# Patient Record
Sex: MALE | Race: WHITE | NOT HISPANIC OR LATINO
[De-identification: names, ages, dates, MRNs, and addresses within clinical notes are randomized per-mention and may not be internally consistent; named-entity substitution may affect disease eponyms.]

---

## 2017-01-04 ENCOUNTER — APPOINTMENT (OUTPATIENT)
Dept: CARDIOLOGY | Facility: CLINIC | Age: 57
End: 2017-01-04

## 2017-01-04 ENCOUNTER — NON-APPOINTMENT (OUTPATIENT)
Age: 57
End: 2017-01-04

## 2017-01-04 VITALS
SYSTOLIC BLOOD PRESSURE: 112 MMHG | DIASTOLIC BLOOD PRESSURE: 72 MMHG | WEIGHT: 225 LBS | BODY MASS INDEX: 31.38 KG/M2 | HEART RATE: 84 BPM | RESPIRATION RATE: 16 BRPM | OXYGEN SATURATION: 96 %

## 2017-01-12 ENCOUNTER — APPOINTMENT (OUTPATIENT)
Dept: CV DIAGNOSTICS | Facility: HOSPITAL | Age: 57
End: 2017-01-12

## 2017-01-12 ENCOUNTER — OUTPATIENT (OUTPATIENT)
Dept: OUTPATIENT SERVICES | Facility: HOSPITAL | Age: 57
LOS: 1 days | End: 2017-01-12

## 2017-01-12 DIAGNOSIS — R07.9 CHEST PAIN, UNSPECIFIED: ICD-10-CM

## 2017-01-12 DIAGNOSIS — K46.9 UNSPECIFIED ABDOMINAL HERNIA WITHOUT OBSTRUCTION OR GANGRENE: Chronic | ICD-10-CM

## 2017-01-12 DIAGNOSIS — I25.10 ATHEROSCLEROTIC HEART DISEASE OF NATIVE CORONARY ARTERY WITHOUT ANGINA PECTORIS: ICD-10-CM

## 2017-01-12 DIAGNOSIS — Z95.1 PRESENCE OF AORTOCORONARY BYPASS GRAFT: Chronic | ICD-10-CM

## 2017-01-12 DIAGNOSIS — Q15.9 CONGENITAL MALFORMATION OF EYE, UNSPECIFIED: Chronic | ICD-10-CM

## 2017-01-12 DIAGNOSIS — E11.9 TYPE 2 DIABETES MELLITUS WITHOUT COMPLICATIONS: ICD-10-CM

## 2017-01-16 ENCOUNTER — TRANSCRIPTION ENCOUNTER (OUTPATIENT)
Age: 57
End: 2017-01-16

## 2017-01-19 ENCOUNTER — INPATIENT (INPATIENT)
Facility: HOSPITAL | Age: 57
LOS: 0 days | Discharge: HOME | End: 2017-01-20
Attending: INTERNAL MEDICINE | Admitting: INTERNAL MEDICINE
Payer: COMMERCIAL

## 2017-01-19 VITALS
RESPIRATION RATE: 18 BRPM | OXYGEN SATURATION: 100 % | TEMPERATURE: 99 F | DIASTOLIC BLOOD PRESSURE: 76 MMHG | HEART RATE: 91 BPM | SYSTOLIC BLOOD PRESSURE: 118 MMHG

## 2017-01-19 DIAGNOSIS — K46.9 UNSPECIFIED ABDOMINAL HERNIA WITHOUT OBSTRUCTION OR GANGRENE: Chronic | ICD-10-CM

## 2017-01-19 DIAGNOSIS — Q15.9 CONGENITAL MALFORMATION OF EYE, UNSPECIFIED: Chronic | ICD-10-CM

## 2017-01-19 DIAGNOSIS — Z95.1 PRESENCE OF AORTOCORONARY BYPASS GRAFT: Chronic | ICD-10-CM

## 2017-01-19 DIAGNOSIS — R94.39 ABNORMAL RESULT OF OTHER CARDIOVASCULAR FUNCTION STUDY: ICD-10-CM

## 2017-01-19 LAB
BUN SERPL-MCNC: 15 MG/DL — SIGNIFICANT CHANGE UP (ref 7–23)
CALCIUM SERPL-MCNC: 9.8 MG/DL — SIGNIFICANT CHANGE UP (ref 8.4–10.5)
CHLORIDE SERPL-SCNC: 102 MMOL/L — SIGNIFICANT CHANGE UP (ref 98–107)
CO2 SERPL-SCNC: 22 MMOL/L — SIGNIFICANT CHANGE UP (ref 22–31)
CREAT SERPL-MCNC: 1.05 MG/DL — SIGNIFICANT CHANGE UP (ref 0.5–1.3)
GLUCOSE SERPL-MCNC: 96 MG/DL — SIGNIFICANT CHANGE UP (ref 70–99)
HBA1C BLD-MCNC: 5.7 % — HIGH (ref 4–5.6)
HCT VFR BLD CALC: 41.6 % — SIGNIFICANT CHANGE UP (ref 39–50)
HGB BLD-MCNC: 14.2 G/DL — SIGNIFICANT CHANGE UP (ref 13–17)
MCHC RBC-ENTMCNC: 30.7 PG — SIGNIFICANT CHANGE UP (ref 27–34)
MCHC RBC-ENTMCNC: 34.1 % — SIGNIFICANT CHANGE UP (ref 32–36)
MCV RBC AUTO: 89.8 FL — SIGNIFICANT CHANGE UP (ref 80–100)
PLATELET # BLD AUTO: 172 K/UL — SIGNIFICANT CHANGE UP (ref 150–400)
PMV BLD: 10.6 FL — SIGNIFICANT CHANGE UP (ref 7–13)
POTASSIUM SERPL-MCNC: 4.5 MMOL/L — SIGNIFICANT CHANGE UP (ref 3.5–5.3)
POTASSIUM SERPL-SCNC: 4.5 MMOL/L — SIGNIFICANT CHANGE UP (ref 3.5–5.3)
RBC # BLD: 4.63 M/UL — SIGNIFICANT CHANGE UP (ref 4.2–5.8)
RBC # FLD: 13.3 % — SIGNIFICANT CHANGE UP (ref 10.3–14.5)
SODIUM SERPL-SCNC: 140 MMOL/L — SIGNIFICANT CHANGE UP (ref 135–145)
WBC # BLD: 9.53 K/UL — SIGNIFICANT CHANGE UP (ref 3.8–10.5)
WBC # FLD AUTO: 9.53 K/UL — SIGNIFICANT CHANGE UP (ref 3.8–10.5)

## 2017-01-19 PROCEDURE — 93010 ELECTROCARDIOGRAM REPORT: CPT

## 2017-01-19 RX ORDER — ROSUVASTATIN CALCIUM 5 MG/1
40 TABLET ORAL AT BEDTIME
Qty: 0 | Refills: 0 | Status: DISCONTINUED | OUTPATIENT
Start: 2017-01-19 | End: 2017-01-20

## 2017-01-19 RX ORDER — SODIUM CHLORIDE 9 MG/ML
500 INJECTION INTRAMUSCULAR; INTRAVENOUS; SUBCUTANEOUS ONCE
Qty: 0 | Refills: 0 | Status: COMPLETED | OUTPATIENT
Start: 2017-01-19 | End: 2017-01-19

## 2017-01-19 RX ORDER — FENTANYL CITRATE 50 UG/ML
25 INJECTION INTRAVENOUS ONCE
Qty: 0 | Refills: 0 | Status: DISCONTINUED | OUTPATIENT
Start: 2017-01-19 | End: 2017-01-19

## 2017-01-19 RX ORDER — ISOSORBIDE MONONITRATE 60 MG/1
30 TABLET, EXTENDED RELEASE ORAL DAILY
Qty: 0 | Refills: 0 | Status: DISCONTINUED | OUTPATIENT
Start: 2017-01-19 | End: 2017-01-20

## 2017-01-19 RX ORDER — NIACIN 50 MG
500 TABLET ORAL
Qty: 0 | Refills: 0 | Status: DISCONTINUED | OUTPATIENT
Start: 2017-01-19 | End: 2017-01-20

## 2017-01-19 RX ORDER — SODIUM CHLORIDE 9 MG/ML
3 INJECTION INTRAMUSCULAR; INTRAVENOUS; SUBCUTANEOUS EVERY 8 HOURS
Qty: 0 | Refills: 0 | Status: DISCONTINUED | OUTPATIENT
Start: 2017-01-19 | End: 2017-01-20

## 2017-01-19 RX ORDER — LOSARTAN POTASSIUM 100 MG/1
50 TABLET, FILM COATED ORAL DAILY
Qty: 0 | Refills: 0 | Status: DISCONTINUED | OUTPATIENT
Start: 2017-01-19 | End: 2017-01-20

## 2017-01-19 RX ORDER — CLOPIDOGREL BISULFATE 75 MG/1
75 TABLET, FILM COATED ORAL DAILY
Qty: 0 | Refills: 0 | Status: DISCONTINUED | OUTPATIENT
Start: 2017-01-19 | End: 2017-01-20

## 2017-01-19 RX ORDER — SERTRALINE 25 MG/1
100 TABLET, FILM COATED ORAL DAILY
Qty: 0 | Refills: 0 | Status: DISCONTINUED | OUTPATIENT
Start: 2017-01-19 | End: 2017-01-20

## 2017-01-19 RX ORDER — ASPIRIN/CALCIUM CARB/MAGNESIUM 324 MG
325 TABLET ORAL DAILY
Qty: 0 | Refills: 0 | Status: DISCONTINUED | OUTPATIENT
Start: 2017-01-19 | End: 2017-01-20

## 2017-01-19 RX ORDER — METOPROLOL TARTRATE 50 MG
25 TABLET ORAL DAILY
Qty: 0 | Refills: 0 | Status: DISCONTINUED | OUTPATIENT
Start: 2017-01-19 | End: 2017-01-20

## 2017-01-19 RX ORDER — ATROPINE SULFATE 0.1 MG/ML
0.6 SYRINGE (ML) INJECTION ONCE
Qty: 0 | Refills: 0 | Status: COMPLETED | OUTPATIENT
Start: 2017-01-19 | End: 2017-01-19

## 2017-01-19 RX ADMIN — Medication 500 MILLIGRAM(S): at 23:42

## 2017-01-19 RX ADMIN — SODIUM CHLORIDE 3000 MILLILITER(S): 9 INJECTION INTRAMUSCULAR; INTRAVENOUS; SUBCUTANEOUS at 18:30

## 2017-01-19 RX ADMIN — ROSUVASTATIN CALCIUM 40 MILLIGRAM(S): 5 TABLET ORAL at 23:42

## 2017-01-19 RX ADMIN — ISOSORBIDE MONONITRATE 30 MILLIGRAM(S): 60 TABLET, EXTENDED RELEASE ORAL at 23:42

## 2017-01-19 RX ADMIN — FENTANYL CITRATE 25 MICROGRAM(S): 50 INJECTION INTRAVENOUS at 18:29

## 2017-01-19 RX ADMIN — Medication 0.6 MILLIGRAM(S): at 18:16

## 2017-01-19 RX ADMIN — FENTANYL CITRATE 25 MICROGRAM(S): 50 INJECTION INTRAVENOUS at 18:16

## 2017-01-19 RX ADMIN — SODIUM CHLORIDE 3 MILLILITER(S): 9 INJECTION INTRAMUSCULAR; INTRAVENOUS; SUBCUTANEOUS at 23:42

## 2017-01-19 RX ADMIN — Medication 325 MILLIGRAM(S): at 23:46

## 2017-01-19 RX ADMIN — SODIUM CHLORIDE 3 MILLILITER(S): 9 INJECTION INTRAMUSCULAR; INTRAVENOUS; SUBCUTANEOUS at 16:59

## 2017-01-19 NOTE — H&P CARDIOLOGY - PSH
CAD (Coronary Artery Disease) of Artery Bypass Graft    CAD (Coronary Artery Disease), Nonautologous Biological Bypass Graft    Eye abnormality  left eye amblyopia repaired in 1994  Hernia  repaired  Hernia, Inguinal    Hypercholesterolemia    Pneumonia due to Organism    S/P CABG x 3  2005

## 2017-01-19 NOTE — PATIENT PROFILE ADULT. - VISION (WITH CORRECTIVE LENSES IF THE PATIENT USUALLY WEARS THEM):
L eye congenital cataract/Partially impaired: cannot see medication labels or newsprint, but can see obstacles in path, and the surrounding layout; can count fingers at arm's length

## 2017-01-19 NOTE — H&P CARDIOLOGY - NEGATIVE NEUROLOGICAL SYMPTOMS
no facial palsy/no confusion/no generalized seizures/no paresthesias/no difficulty walking/no focal seizures/no loss of sensation/no tremors/no headache/no weakness/no hemiparesis/no transient paralysis/no syncope/no vertigo/no loss of consciousness

## 2017-01-19 NOTE — H&P CARDIOLOGY - RS GEN PE MLT RESP DETAILS PC
respirations non-labored/clear to auscultation bilaterally/good air movement/no chest wall tenderness/breath sounds equal/airway patent

## 2017-01-19 NOTE — H&P CARDIOLOGY - HISTORY OF PRESENT ILLNESS
53 y/o M w/ PMH of CAD, CABG x3 (LIMA-LAD, SVG-OM, L radial -Diagonal), HTN, HLD presents for cardiac catheretization. Pt states that he has been having intermittent chest pain at rest for a few months and recently had a nuclear stress test to evaluate the cause of his symptoms. Pt's NST showed medium sized, mild to moderate defects in the inferior and inferolateral walls that are reversible, suggestive of ischemia. Compared with Nuclear/Stress test of 3/21/2016, the ischemia was not evident on the prior study. The current study suggests restenosis given prior stent to SVG of OM1. Pt will have cardiac angiogram to evaluate for worsening CAD. 55 y/o M w/ PMH of CAD, CABG x3 (LIMA-LAD, SVG-OM, L radial -Diagonal), HTN, HLD presents for cardiac catheretization. Pt states that he has been having intermittent chest pain at rest for a few months and recently had a nuclear stress test to evaluate the cause of his symptoms. Pt's NST showed medium sized, mild to moderate defects in the inferior and inferolateral walls that are reversible, suggestive of ischemia. Compared with Nuclear/Stress test of 3/21/2016, the ischemia was not evident on the prior study. The current study suggests restenosis given prior stent to SVG of OM1. Pt will have cardiac angiogram to evaluate for worsening CAD.

## 2017-01-20 ENCOUNTER — TRANSCRIPTION ENCOUNTER (OUTPATIENT)
Age: 57
End: 2017-01-20

## 2017-01-20 VITALS
TEMPERATURE: 98 F | OXYGEN SATURATION: 97 % | SYSTOLIC BLOOD PRESSURE: 105 MMHG | RESPIRATION RATE: 18 BRPM | HEART RATE: 84 BPM | DIASTOLIC BLOOD PRESSURE: 62 MMHG

## 2017-01-20 LAB
BUN SERPL-MCNC: 15 MG/DL — SIGNIFICANT CHANGE UP (ref 7–23)
CALCIUM SERPL-MCNC: 9.2 MG/DL — SIGNIFICANT CHANGE UP (ref 8.4–10.5)
CHLORIDE SERPL-SCNC: 104 MMOL/L — SIGNIFICANT CHANGE UP (ref 98–107)
CO2 SERPL-SCNC: 20 MMOL/L — LOW (ref 22–31)
CREAT SERPL-MCNC: 0.99 MG/DL — SIGNIFICANT CHANGE UP (ref 0.5–1.3)
GLUCOSE SERPL-MCNC: 103 MG/DL — HIGH (ref 70–99)
HCT VFR BLD CALC: 37.9 % — LOW (ref 39–50)
HGB BLD-MCNC: 13 G/DL — SIGNIFICANT CHANGE UP (ref 13–17)
MAGNESIUM SERPL-MCNC: 1.9 MG/DL — SIGNIFICANT CHANGE UP (ref 1.6–2.6)
MCHC RBC-ENTMCNC: 30.7 PG — SIGNIFICANT CHANGE UP (ref 27–34)
MCHC RBC-ENTMCNC: 34.3 % — SIGNIFICANT CHANGE UP (ref 32–36)
MCV RBC AUTO: 89.6 FL — SIGNIFICANT CHANGE UP (ref 80–100)
PHOSPHATE SERPL-MCNC: 3.4 MG/DL — SIGNIFICANT CHANGE UP (ref 2.5–4.5)
PLATELET # BLD AUTO: 175 K/UL — SIGNIFICANT CHANGE UP (ref 150–400)
PMV BLD: 10.8 FL — SIGNIFICANT CHANGE UP (ref 7–13)
POTASSIUM SERPL-MCNC: 4 MMOL/L — SIGNIFICANT CHANGE UP (ref 3.5–5.3)
POTASSIUM SERPL-SCNC: 4 MMOL/L — SIGNIFICANT CHANGE UP (ref 3.5–5.3)
RBC # BLD: 4.23 M/UL — SIGNIFICANT CHANGE UP (ref 4.2–5.8)
RBC # FLD: 13.5 % — SIGNIFICANT CHANGE UP (ref 10.3–14.5)
SODIUM SERPL-SCNC: 141 MMOL/L — SIGNIFICANT CHANGE UP (ref 135–145)
WBC # BLD: 14.03 K/UL — HIGH (ref 3.8–10.5)
WBC # FLD AUTO: 14.03 K/UL — HIGH (ref 3.8–10.5)

## 2017-01-20 RX ORDER — CLOPIDOGREL BISULFATE 75 MG/1
1 TABLET, FILM COATED ORAL
Qty: 0 | Refills: 1 | COMMUNITY
Start: 2017-01-20 | End: 2017-03-20

## 2017-01-20 RX ORDER — METOPROLOL TARTRATE 50 MG
1 TABLET ORAL
Qty: 0 | Refills: 0 | COMMUNITY
Start: 2017-01-20

## 2017-01-20 RX ORDER — ISOSORBIDE MONONITRATE 60 MG/1
1 TABLET, EXTENDED RELEASE ORAL
Qty: 0 | Refills: 0 | COMMUNITY
Start: 2017-01-20

## 2017-01-20 RX ORDER — METFORMIN HYDROCHLORIDE 850 MG/1
1 TABLET ORAL
Qty: 0 | Refills: 0 | COMMUNITY

## 2017-01-20 RX ORDER — ISOSORBIDE MONONITRATE 60 MG/1
1 TABLET, EXTENDED RELEASE ORAL
Qty: 30 | Refills: 1 | OUTPATIENT
Start: 2017-01-20 | End: 2017-03-20

## 2017-01-20 RX ORDER — ASPIRIN/CALCIUM CARB/MAGNESIUM 324 MG
1 TABLET ORAL
Qty: 0 | Refills: 0 | COMMUNITY
Start: 2017-01-20

## 2017-01-20 RX ORDER — DIPHENHYDRAMINE HCL 50 MG
25 CAPSULE ORAL ONCE
Qty: 0 | Refills: 0 | Status: COMPLETED | OUTPATIENT
Start: 2017-01-20 | End: 2017-01-20

## 2017-01-20 RX ORDER — SERTRALINE 25 MG/1
1 TABLET, FILM COATED ORAL
Qty: 0 | Refills: 0 | DISCHARGE
Start: 2017-01-20

## 2017-01-20 RX ORDER — METOPROLOL TARTRATE 50 MG
1 TABLET ORAL
Qty: 0 | Refills: 0 | DISCHARGE
Start: 2017-01-20

## 2017-01-20 RX ORDER — CLOPIDOGREL BISULFATE 75 MG/1
1 TABLET, FILM COATED ORAL
Qty: 30 | Refills: 1 | OUTPATIENT
Start: 2017-01-20 | End: 2017-03-20

## 2017-01-20 RX ORDER — CLOPIDOGREL BISULFATE 75 MG/1
1 TABLET, FILM COATED ORAL
Qty: 0 | Refills: 0 | COMMUNITY
Start: 2017-01-20

## 2017-01-20 RX ORDER — ACETAMINOPHEN 500 MG
650 TABLET ORAL ONCE
Qty: 0 | Refills: 0 | Status: COMPLETED | OUTPATIENT
Start: 2017-01-20 | End: 2017-01-20

## 2017-01-20 RX ADMIN — ISOSORBIDE MONONITRATE 30 MILLIGRAM(S): 60 TABLET, EXTENDED RELEASE ORAL at 11:31

## 2017-01-20 RX ADMIN — Medication 500 MILLIGRAM(S): at 06:57

## 2017-01-20 RX ADMIN — Medication 25 MILLIGRAM(S): at 06:56

## 2017-01-20 RX ADMIN — SODIUM CHLORIDE 3 MILLILITER(S): 9 INJECTION INTRAMUSCULAR; INTRAVENOUS; SUBCUTANEOUS at 06:57

## 2017-01-20 RX ADMIN — Medication 325 MILLIGRAM(S): at 11:31

## 2017-01-20 RX ADMIN — SERTRALINE 100 MILLIGRAM(S): 25 TABLET, FILM COATED ORAL at 11:31

## 2017-01-20 RX ADMIN — LOSARTAN POTASSIUM 50 MILLIGRAM(S): 100 TABLET, FILM COATED ORAL at 11:31

## 2017-01-20 RX ADMIN — Medication 650 MILLIGRAM(S): at 01:30

## 2017-01-20 RX ADMIN — Medication 25 MILLIGRAM(S): at 00:49

## 2017-01-20 RX ADMIN — SODIUM CHLORIDE 3 MILLILITER(S): 9 INJECTION INTRAMUSCULAR; INTRAVENOUS; SUBCUTANEOUS at 13:08

## 2017-01-20 RX ADMIN — Medication 650 MILLIGRAM(S): at 00:48

## 2017-01-20 RX ADMIN — CLOPIDOGREL BISULFATE 75 MILLIGRAM(S): 75 TABLET, FILM COATED ORAL at 11:31

## 2017-01-20 NOTE — DISCHARGE NOTE ADULT - OTHER SIGNIFICANT FINDINGS
(PMH) CAD (coronary artery disease)  (PMH) HTN (hypertension)  (PMH) HLD (hyperlipidemia)  (PMH) S/P CABG  (PSH) S/P CABG x 3  (PSH) Eye abnormality  (PSH) Hypercholesterolemia  (PSH) Hernia, Inguinal

## 2017-01-20 NOTE — DISCHARGE NOTE ADULT - CARE PLAN
Principal Discharge DX:	CAD (coronary artery disease)  Goal:	Prevent progression of disease.  Instructions for follow-up, activity and diet:	Continue ASA/Plavix, Crestor, current medications.   Follow-up with Cardiologist within 1 week.  Secondary Diagnosis:	HTN (hypertension)  Goal:	Reduce blood pressure.  Instructions for follow-up, activity and diet:	Continue Toprol XL, current medications.   Monitor your blood pressure.  Secondary Diagnosis:	HLD (hyperlipidemia)  Goal:	Reduce lipid panel.  Instructions for follow-up, activity and diet:	Continue Crestor. Principal Discharge DX:	CAD (coronary artery disease)  Goal:	Prevent progression of disease.  Instructions for follow-up, activity and diet:	Continue ASA/Plavix, Crestor, current medications.   Follow-up with Cardiologist Dr. Medina within 1 week.  Secondary Diagnosis:	HTN (hypertension)  Goal:	Reduce blood pressure.  Instructions for follow-up, activity and diet:	Continue Toprol XL, current medications.   Monitor your blood pressure.  Secondary Diagnosis:	HLD (hyperlipidemia)  Goal:	Reduce lipid panel.  Instructions for follow-up, activity and diet:	Continue Crestor.

## 2017-01-20 NOTE — DISCHARGE NOTE ADULT - CARE PROVIDERS DIRECT ADDRESSES
[email or web address]

## 2017-01-20 NOTE — DISCHARGE NOTE ADULT - PLAN OF CARE
Prevent progression of disease. Continue ASA/Plavix, Crestor, current medications.   Follow-up with Cardiologist within 1 week. Reduce blood pressure. Continue Toprol XL, current medications.   Monitor your blood pressure. Reduce lipid panel. Continue Crestor. Continue ASA/Plavix, Crestor, current medications.   Follow-up with Cardiologist Dr. Medina within 1 week.

## 2017-01-20 NOTE — DISCHARGE NOTE ADULT - PATIENT PORTAL LINK FT
“You can access the Follow[employer] Patient Portal, offered by [facility], by registering with the following website: [email or web address]”

## 2017-01-20 NOTE — DISCHARGE NOTE ADULT - CARE PROVIDER_API CALL
Jameel Medina (MD), Cardiovascular Disease; Internal Medicine; Interventional Cardiology  [address] [place] [address]  Phone: [phone]  Fax: [phone]

## 2017-01-20 NOTE — DISCHARGE NOTE ADULT - HOSPITAL COURSE
57 y/o M w/ PMH of CAD, CABG x 3 (LIMA-LAD, SVG-OM, L radial -Diagonal), HTN, HLD presents for cardiac catheretization.    + CAD: SVG to OM ostial 90% x 2 THU    1/19 LHC: distal LAD 40-50%, prox LCx 40%, luminal RCA, RPL 40%, LIMA to LAD patent, LIMA to Diag patent, SVG to OM ostial 90% x2 THU, RFA accessed sheath removed at 1710, Start Imdur 30mg PO daily. 57 y/o M w/ PMH of CAD, CABG x 3 (LIMA-LAD, SVG-OM, L radial -Diagonal), HTN, HLD presents for cardiac catheretization.    + CAD: SVG to OM ostial 90% x 2 THU    1/19 LHC: distal LAD 40-50%, prox LCx 40%, luminal RCA, RPL 40%, LIMA to LAD patent, LIMA to Diag patent, SVG to OM ostial 90% x 2 THU, RFA accessed sheath removed at 1710, Start Imdur 30mg PO daily.    1/20/17 Pt is medically stable for discharge home today as per Dr. Medina.

## 2017-01-20 NOTE — DISCHARGE NOTE ADULT - ADDITIONAL INSTRUCTIONS
Follow-up with Cardiologist within 1 week Follow-up with Cardiologist within 1 week.   Monitor groin site for any redness, swelling, bleeding and notify your doctor. You may shower. No baths or swimming for 1 week. No strenuous activity for 3 weeks. Follow-up with Cardiologist Dr. Medina within 1 week. Please call and make an appointment. [phone].    Monitor groin site for any redness, swelling, bleeding and notify your doctor. You may shower. No baths or swimming for 1 week. No strenuous activity for 3 weeks.

## 2017-01-20 NOTE — DISCHARGE NOTE ADULT - MEDICATION SUMMARY - MEDICATIONS TO TAKE
I will START or STAY ON the medications listed below when I get home from the hospital:    aspirin 325 mg oral tablet  -- 1 tab(s) by mouth once a day  -- Indication: For Coronary artery disease     irbesartan 150 mg oral tablet  -- 1 tab(s) by mouth once a day  -- Indication: For HTN    isosorbide mononitrate 30 mg oral tablet, extended release  -- 1 tab(s) by mouth once a day  -- Indication: For Antianginal     sertraline 100 mg oral tablet  -- 1 tab(s) by mouth once a day  -- Indication: For Antidepressant     metFORMIN 500 mg oral tablet  -- 1 tab(s) by mouth 2 times a day, DO NOT RESTART UNTIL January 22, 2017.   -- Indication: For Diabetes Mellitus      Crestor 40 mg oral tablet  -- 1 tab(s) by mouth once a day (at bedtime)  -- Indication: For Hyperlipidemia     niacin 1000 mg oral tablet, extended release  -- 1 tab(s) by mouth once a day (at bedtime)  -- Indication: For Hyperlipidemia     clopidogrel 75 mg oral tablet  -- 1 tab(s) by mouth once a day  -- Indication: For Coronary artery disease     metoprolol succinate 25 mg oral tablet, extended release  -- 1 tab(s) by mouth once a day  -- Indication: For HTN

## 2017-02-01 ENCOUNTER — NON-APPOINTMENT (OUTPATIENT)
Age: 57
End: 2017-02-01

## 2017-02-01 ENCOUNTER — APPOINTMENT (OUTPATIENT)
Dept: CARDIOLOGY | Facility: CLINIC | Age: 57
End: 2017-02-01

## 2017-02-01 VITALS
HEART RATE: 96 BPM | SYSTOLIC BLOOD PRESSURE: 99 MMHG | DIASTOLIC BLOOD PRESSURE: 69 MMHG | OXYGEN SATURATION: 98 % | BODY MASS INDEX: 30.94 KG/M2 | HEIGHT: 71 IN | WEIGHT: 221 LBS

## 2017-02-01 RX ORDER — BROMOCRIPTINE MESYLATE 0.8 MG/1
0.8 TABLET ORAL
Refills: 0 | Status: ACTIVE | COMMUNITY

## 2017-02-01 RX ORDER — TICAGRELOR 90 MG/1
90 TABLET ORAL TWICE DAILY
Qty: 180 | Refills: 3 | Status: ACTIVE | COMMUNITY

## 2017-05-31 ENCOUNTER — EMERGENCY (EMERGENCY)
Facility: HOSPITAL | Age: 57
LOS: 1 days | Discharge: HOME | End: 2017-05-31
Attending: EMERGENCY MEDICINE | Admitting: EMERGENCY MEDICINE
Payer: COMMERCIAL

## 2017-05-31 VITALS
OXYGEN SATURATION: 99 % | WEIGHT: 225.09 LBS | DIASTOLIC BLOOD PRESSURE: 70 MMHG | HEIGHT: 71 IN | HEART RATE: 90 BPM | TEMPERATURE: 99 F | RESPIRATION RATE: 16 BRPM | SYSTOLIC BLOOD PRESSURE: 122 MMHG

## 2017-05-31 DIAGNOSIS — K46.9 UNSPECIFIED ABDOMINAL HERNIA WITHOUT OBSTRUCTION OR GANGRENE: Chronic | ICD-10-CM

## 2017-05-31 DIAGNOSIS — Q15.9 CONGENITAL MALFORMATION OF EYE, UNSPECIFIED: Chronic | ICD-10-CM

## 2017-05-31 DIAGNOSIS — Z95.1 PRESENCE OF AORTOCORONARY BYPASS GRAFT: Chronic | ICD-10-CM

## 2017-05-31 LAB
APPEARANCE UR: CLEAR — SIGNIFICANT CHANGE UP
BILIRUB UR-MCNC: NEGATIVE — SIGNIFICANT CHANGE UP
COLOR SPEC: YELLOW — SIGNIFICANT CHANGE UP
DIFF PNL FLD: ABNORMAL
GLUCOSE UR QL: NEGATIVE MG/DL — SIGNIFICANT CHANGE UP
KETONES UR-MCNC: NEGATIVE — SIGNIFICANT CHANGE UP
LEUKOCYTE ESTERASE UR-ACNC: ABNORMAL
NITRITE UR-MCNC: NEGATIVE — SIGNIFICANT CHANGE UP
PH UR: 5 — SIGNIFICANT CHANGE UP (ref 5–8)
PROT UR-MCNC: NEGATIVE MG/DL — SIGNIFICANT CHANGE UP
SP GR SPEC: 1.02 — SIGNIFICANT CHANGE UP (ref 1.01–1.02)
UROBILINOGEN FLD QL: NEGATIVE MG/DL — SIGNIFICANT CHANGE UP

## 2017-05-31 PROCEDURE — 99284 EMERGENCY DEPT VISIT MOD MDM: CPT

## 2017-05-31 RX ORDER — SODIUM CHLORIDE 9 MG/ML
1000 INJECTION INTRAMUSCULAR; INTRAVENOUS; SUBCUTANEOUS
Qty: 0 | Refills: 0 | Status: DISCONTINUED | OUTPATIENT
Start: 2017-05-31 | End: 2017-06-04

## 2017-05-31 RX ORDER — IOHEXOL 300 MG/ML
30 INJECTION, SOLUTION INTRAVENOUS ONCE
Qty: 0 | Refills: 0 | Status: COMPLETED | OUTPATIENT
Start: 2017-05-31 | End: 2017-05-31

## 2017-05-31 RX ADMIN — SODIUM CHLORIDE 200 MILLILITER(S): 9 INJECTION INTRAMUSCULAR; INTRAVENOUS; SUBCUTANEOUS at 23:43

## 2017-05-31 RX ADMIN — IOHEXOL 30 MILLILITER(S): 300 INJECTION, SOLUTION INTRAVENOUS at 23:43

## 2017-05-31 NOTE — ED PROVIDER NOTE - DETAILS:
Radames Aggarwal MD - The scribe's documentation has been prepared under my direction and personally reviewed by me in its entirety. I confirm that the note above accurately reflects all work, treatment, procedures, and medical decision making performed by me.

## 2017-05-31 NOTE — ED PROVIDER NOTE - NS ED MD SCRIBE ATTENDING SCRIBE SECTIONS
PHYSICAL EXAM/DISPOSITION/HIV/PAST MEDICAL/SURGICAL/SOCIAL HISTORY/HISTORY OF PRESENT ILLNESS/INTAKE ASSESSMENT/SCREENINGS/REVIEW OF SYSTEMS/VITAL SIGNS( Pullset)

## 2017-05-31 NOTE — ED ADULT NURSE NOTE - OBJECTIVE STATEMENT
Pt reported came in for worsening LLQ abdominal pain x 7 days now. Pt denies any vomiting but stated that  his has loose bowel movement and feels nauseous. Pt denies fever and chills

## 2017-05-31 NOTE — ED PROVIDER NOTE - OBJECTIVE STATEMENT
56 y/o M  presents to the ED c/o LLQ abdominal pain x 6 days and back pain x 4 days. Pt states that he was recently on cipro and flagyl. Pt started experiencing LLQ pain on Friday and called his PMD. Pt mentions that he was working in his yard Sunday when he felt muscle spasms in his back. Pt mentions PMHx of diverticulitis and states that this pain feels similar. Pt notes nausea. Denies vomiting, diarrhea, or any other complaints. PSHx of cardiac stents and cardiac bypass

## 2017-05-31 NOTE — ED ADULT TRIAGE NOTE - CHIEF COMPLAINT QUOTE
"Pain to left flank and left abdomen for a week, worse today. lightheaded" recently treated with antibiotics for probable diverticulitis 5/24/17

## 2017-06-01 VITALS
DIASTOLIC BLOOD PRESSURE: 78 MMHG | OXYGEN SATURATION: 98 % | SYSTOLIC BLOOD PRESSURE: 114 MMHG | TEMPERATURE: 98 F | RESPIRATION RATE: 18 BRPM | HEART RATE: 75 BPM

## 2017-06-01 LAB
ALBUMIN SERPL ELPH-MCNC: 3.9 G/DL — SIGNIFICANT CHANGE UP (ref 3.3–5)
ALP SERPL-CCNC: 76 U/L — SIGNIFICANT CHANGE UP (ref 30–120)
ALT FLD-CCNC: 52 U/L DA — SIGNIFICANT CHANGE UP (ref 10–60)
ANION GAP SERPL CALC-SCNC: 7 MMOL/L — SIGNIFICANT CHANGE UP (ref 5–17)
AST SERPL-CCNC: 42 U/L — HIGH (ref 10–40)
BACTERIA # UR AUTO: ABNORMAL
BASOPHILS # BLD AUTO: 0 K/UL — SIGNIFICANT CHANGE UP (ref 0–0.2)
BASOPHILS NFR BLD AUTO: 0.2 % — SIGNIFICANT CHANGE UP (ref 0–2)
BILIRUB SERPL-MCNC: 0.5 MG/DL — SIGNIFICANT CHANGE UP (ref 0.2–1.2)
BUN SERPL-MCNC: 17 MG/DL — SIGNIFICANT CHANGE UP (ref 7–23)
CALCIUM SERPL-MCNC: 9.3 MG/DL — SIGNIFICANT CHANGE UP (ref 8.4–10.5)
CHLORIDE SERPL-SCNC: 105 MMOL/L — SIGNIFICANT CHANGE UP (ref 96–108)
CO2 SERPL-SCNC: 29 MMOL/L — SIGNIFICANT CHANGE UP (ref 22–31)
CREAT SERPL-MCNC: 1.02 MG/DL — SIGNIFICANT CHANGE UP (ref 0.5–1.3)
EOSINOPHIL # BLD AUTO: 0.2 K/UL — SIGNIFICANT CHANGE UP (ref 0–0.5)
EOSINOPHIL NFR BLD AUTO: 2 % — SIGNIFICANT CHANGE UP (ref 0–6)
EPI CELLS # UR: SIGNIFICANT CHANGE UP
GLUCOSE SERPL-MCNC: 89 MG/DL — SIGNIFICANT CHANGE UP (ref 70–99)
HCT VFR BLD CALC: 40.6 % — SIGNIFICANT CHANGE UP (ref 39–50)
HGB BLD-MCNC: 13.3 G/DL — SIGNIFICANT CHANGE UP (ref 13–17)
LIDOCAIN IGE QN: 242 U/L — SIGNIFICANT CHANGE UP (ref 73–393)
LYMPHOCYTES # BLD AUTO: 2.9 K/UL — SIGNIFICANT CHANGE UP (ref 1–3.3)
LYMPHOCYTES # BLD AUTO: 32.4 % — SIGNIFICANT CHANGE UP (ref 13–44)
MCHC RBC-ENTMCNC: 30.2 PG — SIGNIFICANT CHANGE UP (ref 27–34)
MCHC RBC-ENTMCNC: 32.8 GM/DL — SIGNIFICANT CHANGE UP (ref 32–36)
MCV RBC AUTO: 92 FL — SIGNIFICANT CHANGE UP (ref 80–100)
MONOCYTES # BLD AUTO: 0.5 K/UL — SIGNIFICANT CHANGE UP (ref 0–0.9)
MONOCYTES NFR BLD AUTO: 5.4 % — SIGNIFICANT CHANGE UP (ref 2–14)
NEUTROPHILS # BLD AUTO: 5.4 K/UL — SIGNIFICANT CHANGE UP (ref 1.8–7.4)
NEUTROPHILS NFR BLD AUTO: 60 % — SIGNIFICANT CHANGE UP (ref 43–77)
PLATELET # BLD AUTO: 182 K/UL — SIGNIFICANT CHANGE UP (ref 150–400)
POTASSIUM SERPL-MCNC: 4.2 MMOL/L — SIGNIFICANT CHANGE UP (ref 3.5–5.3)
POTASSIUM SERPL-SCNC: 4.2 MMOL/L — SIGNIFICANT CHANGE UP (ref 3.5–5.3)
PROT SERPL-MCNC: 7.1 G/DL — SIGNIFICANT CHANGE UP (ref 6–8.3)
RBC # BLD: 4.41 M/UL — SIGNIFICANT CHANGE UP (ref 4.2–5.8)
RBC # FLD: 12.7 % — SIGNIFICANT CHANGE UP (ref 10.3–14.5)
RBC CASTS # UR COMP ASSIST: SIGNIFICANT CHANGE UP /HPF (ref 0–4)
SODIUM SERPL-SCNC: 141 MMOL/L — SIGNIFICANT CHANGE UP (ref 135–145)
WBC # BLD: 9.1 K/UL — SIGNIFICANT CHANGE UP (ref 3.8–10.5)
WBC # FLD AUTO: 9.1 K/UL — SIGNIFICANT CHANGE UP (ref 3.8–10.5)
WBC UR QL: SIGNIFICANT CHANGE UP

## 2017-06-01 PROCEDURE — 85027 COMPLETE CBC AUTOMATED: CPT

## 2017-06-01 PROCEDURE — 99284 EMERGENCY DEPT VISIT MOD MDM: CPT | Mod: 25

## 2017-06-01 PROCEDURE — 74177 CT ABD & PELVIS W/CONTRAST: CPT

## 2017-06-01 PROCEDURE — 81001 URINALYSIS AUTO W/SCOPE: CPT

## 2017-06-01 PROCEDURE — 83690 ASSAY OF LIPASE: CPT

## 2017-06-01 PROCEDURE — 74177 CT ABD & PELVIS W/CONTRAST: CPT | Mod: 26

## 2017-06-01 PROCEDURE — 80053 COMPREHEN METABOLIC PANEL: CPT

## 2017-06-01 RX ORDER — METRONIDAZOLE 500 MG
1 TABLET ORAL
Qty: 15 | Refills: 0 | OUTPATIENT
Start: 2017-06-01 | End: 2017-06-06

## 2017-06-01 RX ORDER — MOXIFLOXACIN HYDROCHLORIDE TABLETS, 400 MG 400 MG/1
1 TABLET, FILM COATED ORAL
Qty: 10 | Refills: 0 | OUTPATIENT
Start: 2017-06-01 | End: 2017-06-06

## 2017-08-02 ENCOUNTER — APPOINTMENT (OUTPATIENT)
Dept: CARDIOLOGY | Facility: CLINIC | Age: 57
End: 2017-08-02
Payer: COMMERCIAL

## 2017-08-02 ENCOUNTER — NON-APPOINTMENT (OUTPATIENT)
Age: 57
End: 2017-08-02

## 2017-08-02 VITALS
RESPIRATION RATE: 16 BRPM | HEIGHT: 71 IN | HEART RATE: 78 BPM | SYSTOLIC BLOOD PRESSURE: 109 MMHG | OXYGEN SATURATION: 98 % | WEIGHT: 225 LBS | BODY MASS INDEX: 31.5 KG/M2 | DIASTOLIC BLOOD PRESSURE: 72 MMHG

## 2017-08-02 PROCEDURE — 99215 OFFICE O/P EST HI 40 MIN: CPT

## 2017-08-02 PROCEDURE — 93000 ELECTROCARDIOGRAM COMPLETE: CPT

## 2017-08-02 RX ORDER — METOPROLOL SUCCINATE 25 MG/1
25 TABLET, EXTENDED RELEASE ORAL DAILY
Refills: 0 | Status: ACTIVE | COMMUNITY

## 2017-12-13 ENCOUNTER — APPOINTMENT (OUTPATIENT)
Dept: CARDIOLOGY | Facility: CLINIC | Age: 57
End: 2017-12-13
Payer: COMMERCIAL

## 2017-12-13 ENCOUNTER — NON-APPOINTMENT (OUTPATIENT)
Age: 57
End: 2017-12-13

## 2017-12-13 VITALS
OXYGEN SATURATION: 96 % | SYSTOLIC BLOOD PRESSURE: 125 MMHG | RESPIRATION RATE: 16 BRPM | WEIGHT: 225 LBS | HEART RATE: 91 BPM | DIASTOLIC BLOOD PRESSURE: 85 MMHG | BODY MASS INDEX: 31.5 KG/M2 | HEIGHT: 71 IN

## 2017-12-13 PROCEDURE — 93000 ELECTROCARDIOGRAM COMPLETE: CPT

## 2017-12-13 PROCEDURE — 99214 OFFICE O/P EST MOD 30 MIN: CPT

## 2017-12-13 RX ORDER — MOMETASONE 50 UG/1
50 SPRAY, METERED NASAL
Qty: 17 | Refills: 0 | Status: ACTIVE | COMMUNITY
Start: 2017-08-20

## 2018-03-02 ENCOUNTER — EMERGENCY (EMERGENCY)
Facility: HOSPITAL | Age: 58
LOS: 1 days | Discharge: HOME | End: 2018-03-02
Attending: EMERGENCY MEDICINE | Admitting: EMERGENCY MEDICINE
Payer: COMMERCIAL

## 2018-03-02 VITALS
HEART RATE: 90 BPM | SYSTOLIC BLOOD PRESSURE: 157 MMHG | OXYGEN SATURATION: 98 % | TEMPERATURE: 100 F | RESPIRATION RATE: 16 BRPM | HEIGHT: 71 IN | DIASTOLIC BLOOD PRESSURE: 77 MMHG | WEIGHT: 225.09 LBS

## 2018-03-02 DIAGNOSIS — K46.9 UNSPECIFIED ABDOMINAL HERNIA WITHOUT OBSTRUCTION OR GANGRENE: Chronic | ICD-10-CM

## 2018-03-02 DIAGNOSIS — Z95.1 PRESENCE OF AORTOCORONARY BYPASS GRAFT: Chronic | ICD-10-CM

## 2018-03-02 DIAGNOSIS — Q15.9 CONGENITAL MALFORMATION OF EYE, UNSPECIFIED: Chronic | ICD-10-CM

## 2018-03-02 LAB
ALBUMIN SERPL ELPH-MCNC: 4.2 G/DL — SIGNIFICANT CHANGE UP (ref 3.3–5)
ALP SERPL-CCNC: 96 U/L — SIGNIFICANT CHANGE UP (ref 40–120)
ALT FLD-CCNC: 33 U/L — SIGNIFICANT CHANGE UP (ref 12–78)
ANION GAP SERPL CALC-SCNC: 8 MMOL/L — SIGNIFICANT CHANGE UP (ref 5–17)
APPEARANCE UR: CLEAR — SIGNIFICANT CHANGE UP
AST SERPL-CCNC: 23 U/L — SIGNIFICANT CHANGE UP (ref 15–37)
BASOPHILS # BLD AUTO: 0.1 K/UL — SIGNIFICANT CHANGE UP (ref 0–0.2)
BASOPHILS NFR BLD AUTO: 0.7 % — SIGNIFICANT CHANGE UP (ref 0–2)
BILIRUB SERPL-MCNC: 0.6 MG/DL — SIGNIFICANT CHANGE UP (ref 0.2–1.2)
BILIRUB UR-MCNC: NEGATIVE — SIGNIFICANT CHANGE UP
BUN SERPL-MCNC: 14 MG/DL — SIGNIFICANT CHANGE UP (ref 7–23)
CALCIUM SERPL-MCNC: 9.2 MG/DL — SIGNIFICANT CHANGE UP (ref 8.5–10.1)
CHLORIDE SERPL-SCNC: 107 MMOL/L — SIGNIFICANT CHANGE UP (ref 96–108)
CK MB BLD-MCNC: 0.5 % — SIGNIFICANT CHANGE UP (ref 0–3.5)
CK MB CFR SERPL CALC: 1.1 NG/ML — SIGNIFICANT CHANGE UP (ref 0–3.6)
CK SERPL-CCNC: 240 U/L — SIGNIFICANT CHANGE UP (ref 26–308)
CO2 SERPL-SCNC: 25 MMOL/L — SIGNIFICANT CHANGE UP (ref 22–31)
COLOR SPEC: YELLOW — SIGNIFICANT CHANGE UP
CREAT SERPL-MCNC: 1 MG/DL — SIGNIFICANT CHANGE UP (ref 0.5–1.3)
D DIMER BLD IA.RAPID-MCNC: <150 NG/ML DDU — SIGNIFICANT CHANGE UP
DIFF PNL FLD: ABNORMAL
EOSINOPHIL # BLD AUTO: 0.2 K/UL — SIGNIFICANT CHANGE UP (ref 0–0.5)
EOSINOPHIL NFR BLD AUTO: 1.4 % — SIGNIFICANT CHANGE UP (ref 0–6)
GLUCOSE SERPL-MCNC: 93 MG/DL — SIGNIFICANT CHANGE UP (ref 70–99)
GLUCOSE UR QL: NEGATIVE — SIGNIFICANT CHANGE UP
HCT VFR BLD CALC: 42.4 % — SIGNIFICANT CHANGE UP (ref 39–50)
HGB BLD-MCNC: 14.6 G/DL — SIGNIFICANT CHANGE UP (ref 13–17)
KETONES UR-MCNC: NEGATIVE — SIGNIFICANT CHANGE UP
LACTATE SERPL-SCNC: 1.1 MMOL/L — SIGNIFICANT CHANGE UP (ref 0.7–2)
LEUKOCYTE ESTERASE UR-ACNC: NEGATIVE — SIGNIFICANT CHANGE UP
LYMPHOCYTES # BLD AUTO: 2.6 K/UL — SIGNIFICANT CHANGE UP (ref 1–3.3)
LYMPHOCYTES # BLD AUTO: 24.3 % — SIGNIFICANT CHANGE UP (ref 13–44)
MCHC RBC-ENTMCNC: 30.5 PG — SIGNIFICANT CHANGE UP (ref 27–34)
MCHC RBC-ENTMCNC: 34.4 GM/DL — SIGNIFICANT CHANGE UP (ref 32–36)
MCV RBC AUTO: 88.7 FL — SIGNIFICANT CHANGE UP (ref 80–100)
MONOCYTES # BLD AUTO: 0.5 K/UL — SIGNIFICANT CHANGE UP (ref 0–0.9)
MONOCYTES NFR BLD AUTO: 4.2 % — SIGNIFICANT CHANGE UP (ref 1–9)
NEUTROPHILS # BLD AUTO: 7.5 K/UL — HIGH (ref 1.8–7.4)
NEUTROPHILS NFR BLD AUTO: 69.4 % — SIGNIFICANT CHANGE UP (ref 43–77)
NITRITE UR-MCNC: NEGATIVE — SIGNIFICANT CHANGE UP
PH UR: 5 — SIGNIFICANT CHANGE UP (ref 5–8)
PLATELET # BLD AUTO: 206 K/UL — SIGNIFICANT CHANGE UP (ref 150–400)
POTASSIUM SERPL-MCNC: 3.8 MMOL/L — SIGNIFICANT CHANGE UP (ref 3.5–5.3)
POTASSIUM SERPL-SCNC: 3.8 MMOL/L — SIGNIFICANT CHANGE UP (ref 3.5–5.3)
PROT SERPL-MCNC: 7.7 G/DL — SIGNIFICANT CHANGE UP (ref 6–8.3)
PROT UR-MCNC: NEGATIVE — SIGNIFICANT CHANGE UP
RBC # BLD: 4.78 M/UL — SIGNIFICANT CHANGE UP (ref 4.2–5.8)
RBC # FLD: 12.7 % — SIGNIFICANT CHANGE UP (ref 10.3–14.5)
SODIUM SERPL-SCNC: 140 MMOL/L — SIGNIFICANT CHANGE UP (ref 135–145)
SP GR SPEC: 1.02 — SIGNIFICANT CHANGE UP (ref 1.01–1.02)
TROPONIN I SERPL-MCNC: <.015 NG/ML — SIGNIFICANT CHANGE UP (ref 0.01–0.04)
UROBILINOGEN FLD QL: NEGATIVE — SIGNIFICANT CHANGE UP
WBC # BLD: 10.8 K/UL — HIGH (ref 3.8–10.5)
WBC # FLD AUTO: 10.8 K/UL — HIGH (ref 3.8–10.5)

## 2018-03-02 PROCEDURE — 71046 X-RAY EXAM CHEST 2 VIEWS: CPT | Mod: 26

## 2018-03-02 PROCEDURE — 99285 EMERGENCY DEPT VISIT HI MDM: CPT

## 2018-03-02 RX ORDER — SODIUM CHLORIDE 9 MG/ML
1000 INJECTION INTRAMUSCULAR; INTRAVENOUS; SUBCUTANEOUS ONCE
Qty: 0 | Refills: 0 | Status: COMPLETED | OUTPATIENT
Start: 2018-03-02 | End: 2018-03-02

## 2018-03-02 RX ORDER — ACETAMINOPHEN 500 MG
650 TABLET ORAL ONCE
Qty: 0 | Refills: 0 | Status: COMPLETED | OUTPATIENT
Start: 2018-03-02 | End: 2018-03-02

## 2018-03-02 RX ADMIN — SODIUM CHLORIDE 1000 MILLILITER(S): 9 INJECTION INTRAMUSCULAR; INTRAVENOUS; SUBCUTANEOUS at 20:13

## 2018-03-02 RX ADMIN — Medication 650 MILLIGRAM(S): at 23:59

## 2018-03-02 NOTE — ED PROVIDER NOTE - ENMT, MLM
Airway patent, Nasal mucosa clear. Mouth with normal mucosa. Throat has no vesicles, no oropharyngeal exudates and uvula is midline. MM Moist. non-toxic, well appearing. neck supple

## 2018-03-02 NOTE — ED PROVIDER NOTE - CHPI ED SYMPTOMS NEG
no chills/no back pain/no diaphoresis/no shortness of breath/no cough/no fever/no nausea/no syncope/no vomiting

## 2018-03-02 NOTE — ED ADULT NURSE NOTE - OBJECTIVE STATEMENT
Pt. received alert and oriented x4 with chief complaint of intermittent chest pain starting this morning, and during the day radiating to left upper back. Pt. also states trouble taking deep breaths for last few hours. Pt. placed on continuous cardiac monitor with continuous pulse ox. Pt. presents w/ clear breath sounds upon auscultation. EKG performed at bedside upon arrival.

## 2018-03-02 NOTE — ED PROVIDER NOTE - PROGRESS NOTE DETAILS
Pt seen by cardiology: if second ce and ekg ar normal ok to go and follow up with dr Booker Medina [facility] cards (private cards)

## 2018-03-02 NOTE — ED PROVIDER NOTE - OBJECTIVE STATEMENT
58 yo M p/w co chest discomfort starting this evening. Pt states mild dyspnea over past ~ 2 weeks, not really exertional. No neck / back pain. No numb/ting/focal weak. no fever/chills. Mild cough today as well. Pt states similar sx in past with PNA. no numb/ting/focal weak. no recent illness. no recent travel / immob. No agg/allev factors. No other inj or co.

## 2018-03-03 ENCOUNTER — OTHER (OUTPATIENT)
Age: 58
End: 2018-03-03

## 2018-03-03 VITALS
DIASTOLIC BLOOD PRESSURE: 62 MMHG | HEART RATE: 77 BPM | OXYGEN SATURATION: 96 % | RESPIRATION RATE: 17 BRPM | SYSTOLIC BLOOD PRESSURE: 109 MMHG

## 2018-03-03 LAB
BACTERIA # UR AUTO: ABNORMAL
CK MB BLD-MCNC: 0.4 % — SIGNIFICANT CHANGE UP (ref 0–3.5)
CK MB BLD-MCNC: 0.5 % — SIGNIFICANT CHANGE UP (ref 0–3.5)
CK MB CFR SERPL CALC: 0.9 NG/ML — SIGNIFICANT CHANGE UP (ref 0–3.6)
CK MB CFR SERPL CALC: 0.9 NG/ML — SIGNIFICANT CHANGE UP (ref 0–3.6)
CK SERPL-CCNC: 195 U/L — SIGNIFICANT CHANGE UP (ref 26–308)
CK SERPL-CCNC: 204 U/L — SIGNIFICANT CHANGE UP (ref 26–308)
RAPID RVP RESULT: SIGNIFICANT CHANGE UP
RBC CASTS # UR COMP ASSIST: SIGNIFICANT CHANGE UP /HPF (ref 0–4)
TROPONIN I SERPL-MCNC: <.015 NG/ML — SIGNIFICANT CHANGE UP (ref 0.01–0.04)
TROPONIN I SERPL-MCNC: <.015 NG/ML — SIGNIFICANT CHANGE UP (ref 0.01–0.04)
WBC UR QL: SIGNIFICANT CHANGE UP

## 2018-03-03 PROCEDURE — 99284 EMERGENCY DEPT VISIT MOD MDM: CPT | Mod: 25

## 2018-03-03 PROCEDURE — 82550 ASSAY OF CK (CPK): CPT

## 2018-03-03 PROCEDURE — 87633 RESP VIRUS 12-25 TARGETS: CPT

## 2018-03-03 PROCEDURE — 83605 ASSAY OF LACTIC ACID: CPT

## 2018-03-03 PROCEDURE — 80053 COMPREHEN METABOLIC PANEL: CPT

## 2018-03-03 PROCEDURE — 36415 COLL VENOUS BLD VENIPUNCTURE: CPT

## 2018-03-03 PROCEDURE — 87086 URINE CULTURE/COLONY COUNT: CPT

## 2018-03-03 PROCEDURE — 99285 EMERGENCY DEPT VISIT HI MDM: CPT

## 2018-03-03 PROCEDURE — 87040 BLOOD CULTURE FOR BACTERIA: CPT

## 2018-03-03 PROCEDURE — 71046 X-RAY EXAM CHEST 2 VIEWS: CPT

## 2018-03-03 PROCEDURE — 81001 URINALYSIS AUTO W/SCOPE: CPT

## 2018-03-03 PROCEDURE — 93005 ELECTROCARDIOGRAM TRACING: CPT

## 2018-03-03 PROCEDURE — 87798 DETECT AGENT NOS DNA AMP: CPT

## 2018-03-03 PROCEDURE — 84484 ASSAY OF TROPONIN QUANT: CPT

## 2018-03-03 PROCEDURE — 87581 M.PNEUMON DNA AMP PROBE: CPT

## 2018-03-03 PROCEDURE — 82553 CREATINE MB FRACTION: CPT

## 2018-03-03 PROCEDURE — 87486 CHLMYD PNEUM DNA AMP PROBE: CPT

## 2018-03-03 PROCEDURE — 85379 FIBRIN DEGRADATION QUANT: CPT

## 2018-03-03 PROCEDURE — 85027 COMPLETE CBC AUTOMATED: CPT

## 2018-03-03 RX ORDER — KETOROLAC TROMETHAMINE 30 MG/ML
30 SYRINGE (ML) INJECTION ONCE
Qty: 0 | Refills: 0 | Status: DISCONTINUED | OUTPATIENT
Start: 2018-03-03 | End: 2018-03-03

## 2018-03-03 RX ADMIN — Medication 650 MILLIGRAM(S): at 02:33

## 2018-03-03 RX ADMIN — Medication 30 MILLIGRAM(S): at 02:35

## 2018-03-03 NOTE — CONSULT NOTE ADULT - SUBJECTIVE AND OBJECTIVE BOX
[facility] Consultation    CHIEF COMPLAINT: Patient is a 57y old  Male who presents with a chief complaint of back pain    HPI:  Prashanth presents for evaluation of back discomfort. Yesterday while driving, he noted discomfort in the back which he described as worse with inspiration and moving from side to side. This is different than the discomfort he had experienced with anginal symptoms in the past which he remembers as chest pressure. He had a mild cough over the last day but no significant dyspnea, diaphoresis, lightheadedness, or syncope    Cardiac history is remarkable for coronary artery bypass surgery 3 vessels in 2005 in [facility], Promus drug eluting stent to the saphenous vein graft to the first obtuse marginal artery in January 2017 after stress testing revealed a suggestion of inferior and inferolateral ischemia    PAST MEDICAL & SURGICAL HISTORY:  HLD (hyperlipidemia)  HTN (hypertension)  CAD (coronary artery disease): CABG x3  S/P CABG  Eye abnormality: left eye amblyopia repaired in 1994  Hernia: repaired  S/P CABG x 3: 2005  Hypercholesterolemia  Hernia, Inguinal  Pneumonia due to Organism  CAD (Coronary Artery Disease) of Artery Bypass Graft  CAD (Coronary Artery Disease), Nonautologous Biological Bypass Graft      SOCIAL HISTORY: no tob/etoh    FAMILY HISTORY: Dad with CAD        MEDICATIONS  (STANDING): Aspirin, Brilinta, Crestor, Irbesartan, metformin, metoprolol, sertraline, Vascepa    Allergies    No Known Allergies      REVIEW OF SYSTEMS:    CONSTITUTIONAL: No weakness, fevers or chills  EYES: No visual changes, No diplopia  ENMT: No throat pain , No exudate  NECK: No pain or stiffness  RESPIRATORY: No cough, wheezing, hemoptysis; No shortness of breath  CARDIOVASCULAR: No chest pain or palpitations  GASTROINTESTINAL: No abdominal pain. No nausea, vomiting, or hematemesis; No diarrhea or constipation. No melena or hematochezia.  GENITOURINARY: No dysuria, frequency or hematuria  NEUROLOGICAL: No numbness or weakness  SKIN: No itching or rash  All other review of systems is negative unless indicated above    VITAL SIGNS:   Vital Signs Last 24 Hrs  T(C): 37.2 (03 Mar 2018 05:35), Max: 37.8 (02 Mar 2018 19:37)  T(F): 98.9 (03 Mar 2018 05:35), Max: 100 (02 Mar 2018 19:37)  HR: 77 (03 Mar 2018 08:10) (77 - 92)  BP: 109/62 (03 Mar 2018 08:10) (109/62 - 157/77)  BP(mean): --  RR: 17 (03 Mar 2018 08:10) (15 - 18)  SpO2: 96% (03 Mar 2018 08:10) (96% - 99%)    PHYSICAL EXAM:    Constitutional: NAD, awake and alert, well-developed  Eyes:  EOMI,  Pupils round, no lesions  ENMT: no exudate or erythema  Pulmonary: Non-labored, breath sounds are clear bilaterally, No wheezing, rales or rhonchi  Cardiovascular: PMI not palpable  Regular S1 and S2, no murmurs, rubs, gallops or clicks  Gastrointestinal: Bowel Sounds present, soft, nontender.   Lymph: No peripheral edema. No cervical lymphadenopathy.  Neurological: Alert, no focal deficits  Skin: No rashes.  No cyanosis.  Psych:  Mood & affect appropriate    LABS: All Labs Reviewed:                        14.6   10.8  )-----------( 206      ( 02 Mar 2018 20:10 )             42.4     02 Mar 2018 20:10    140    |  107    |  14     ----------------------------<  93     3.8     |  25     |  1.00     Ca    9.2        02 Mar 2018 20:10    TPro  7.7    /  Alb  4.2    /  TBili  0.6    /  DBili  x      /  AST  23     /  ALT  33     /  AlkPhos  96     02 Mar 2018 20:10      CARDIAC MARKERS ( 03 Mar 2018 08:10 )  <.015 ng/mL / x     / 195 U/L / x     / 0.9 ng/mL  CARDIAC MARKERS ( 03 Mar 2018 02:31 )  <.015 ng/mL / x     / 204 U/L / x     / 0.9 ng/mL  CARDIAC MARKERS ( 02 Mar 2018 20:10 )  <.015 ng/mL / x     / 240 U/L / x     / 1.1 ng/mL        Electrocardiography reveals sinus rhythm with poor R wave progression and possible inferior myocardial infarction of indeterminate age    < from: Xray Chest 2 Views PA/Lat (03.02.18 @ 21:03) >    EXAM:  XR CHEST PA LAT 2V                            PROCEDURE DATE:  03/02/2018          INTERPRETATION:  Clinical information: Chest pain    2 view of the chest, frontal and lateral    Comparison study dated 4/7/2014.    Sternal sutures-mediastinal clips present. Clear lungs. No sign of lobar   consolidation vascular congestion or effusion. Heart size within normal   limits. Hilar regions, mediastinal contours intact. Osseous structures   intact. Coronary artery stent visible overlying the heart, appreciated on   the lateral view.    IMPRESSION: See above report                  DAISY GARCIA M.D.,ATTENDING RADIOLOGIST  This document has been electronically signed. Mar  3 2018  8:15AM                < end of copied text >

## 2018-03-03 NOTE — CONSULT NOTE ADULT - ASSESSMENT
Prashanth appears well with atypical discomfort likely musculoskeletal in etiology. He has no evidence for new active cardiac issues such as ischemia, heart failure, or dysrhythmias. Workup was unremarkable including cardiac enzymes. He can be discharged home he will continue his current medical regimen including dual antiplatelet therapy. No other cardiac evaluation is necessary at this time and he will see Dr. Booker Medina in the office.

## 2018-03-04 LAB
CULTURE RESULTS: SIGNIFICANT CHANGE UP
SPECIMEN SOURCE: SIGNIFICANT CHANGE UP

## 2018-03-08 LAB
CULTURE RESULTS: SIGNIFICANT CHANGE UP
CULTURE RESULTS: SIGNIFICANT CHANGE UP
SPECIMEN SOURCE: SIGNIFICANT CHANGE UP
SPECIMEN SOURCE: SIGNIFICANT CHANGE UP

## 2018-03-14 ENCOUNTER — APPOINTMENT (OUTPATIENT)
Dept: CARDIOLOGY | Facility: CLINIC | Age: 58
End: 2018-03-14
Payer: COMMERCIAL

## 2018-03-14 ENCOUNTER — NON-APPOINTMENT (OUTPATIENT)
Age: 58
End: 2018-03-14

## 2018-03-14 VITALS
SYSTOLIC BLOOD PRESSURE: 115 MMHG | OXYGEN SATURATION: 97 % | BODY MASS INDEX: 31.5 KG/M2 | HEIGHT: 71 IN | WEIGHT: 225 LBS | HEART RATE: 91 BPM | RESPIRATION RATE: 16 BRPM | DIASTOLIC BLOOD PRESSURE: 80 MMHG

## 2018-03-14 PROCEDURE — 99215 OFFICE O/P EST HI 40 MIN: CPT

## 2018-03-14 PROCEDURE — 93000 ELECTROCARDIOGRAM COMPLETE: CPT

## 2018-03-14 RX ORDER — METFORMIN ER 500 MG 500 MG/1
500 TABLET ORAL
Qty: 360 | Refills: 0 | Status: DISCONTINUED | COMMUNITY
Start: 2017-01-27 | End: 2018-03-14

## 2018-03-14 RX ORDER — PIOGLITAZONE HYDROCHLORIDE 30 MG/1
30 TABLET ORAL
Qty: 90 | Refills: 0 | Status: ACTIVE | COMMUNITY
Start: 2018-02-22

## 2018-03-14 RX ORDER — IRBESARTAN 75 MG/1
75 TABLET ORAL
Qty: 90 | Refills: 0 | Status: ACTIVE | COMMUNITY
Start: 2018-02-22

## 2018-04-30 RX ORDER — IRBESARTAN 75 MG/1
1 TABLET ORAL
Qty: 0 | Refills: 0 | COMMUNITY

## 2018-05-01 ENCOUNTER — OUTPATIENT (OUTPATIENT)
Dept: OUTPATIENT SERVICES | Facility: HOSPITAL | Age: 58
LOS: 1 days | End: 2018-05-01
Payer: COMMERCIAL

## 2018-05-01 VITALS
DIASTOLIC BLOOD PRESSURE: 69 MMHG | WEIGHT: 227.08 LBS | SYSTOLIC BLOOD PRESSURE: 119 MMHG | HEART RATE: 83 BPM | TEMPERATURE: 98 F | HEIGHT: 71 IN | RESPIRATION RATE: 15 BRPM

## 2018-05-01 DIAGNOSIS — K46.9 UNSPECIFIED ABDOMINAL HERNIA WITHOUT OBSTRUCTION OR GANGRENE: Chronic | ICD-10-CM

## 2018-05-01 DIAGNOSIS — Q15.9 CONGENITAL MALFORMATION OF EYE, UNSPECIFIED: Chronic | ICD-10-CM

## 2018-05-01 DIAGNOSIS — Z95.1 PRESENCE OF AORTOCORONARY BYPASS GRAFT: Chronic | ICD-10-CM

## 2018-05-01 DIAGNOSIS — Z95.5 PRESENCE OF CORONARY ANGIOPLASTY IMPLANT AND GRAFT: Chronic | ICD-10-CM

## 2018-05-01 DIAGNOSIS — Z01.818 ENCOUNTER FOR OTHER PREPROCEDURAL EXAMINATION: ICD-10-CM

## 2018-05-01 DIAGNOSIS — S83.232A COMPLEX TEAR OF MEDIAL MENISCUS, CURRENT INJURY, LEFT KNEE, INITIAL ENCOUNTER: ICD-10-CM

## 2018-05-01 LAB
ALBUMIN SERPL ELPH-MCNC: 4.3 G/DL — SIGNIFICANT CHANGE UP (ref 3.3–5)
ALP SERPL-CCNC: 92 U/L — SIGNIFICANT CHANGE UP (ref 40–120)
ALT FLD-CCNC: 22 U/L — SIGNIFICANT CHANGE UP (ref 12–78)
ANION GAP SERPL CALC-SCNC: 6 MMOL/L — SIGNIFICANT CHANGE UP (ref 5–17)
AST SERPL-CCNC: 20 U/L — SIGNIFICANT CHANGE UP (ref 15–37)
BILIRUB SERPL-MCNC: 0.8 MG/DL — SIGNIFICANT CHANGE UP (ref 0.2–1.2)
BUN SERPL-MCNC: 17 MG/DL — SIGNIFICANT CHANGE UP (ref 7–23)
CALCIUM SERPL-MCNC: 9.1 MG/DL — SIGNIFICANT CHANGE UP (ref 8.5–10.1)
CHLORIDE SERPL-SCNC: 108 MMOL/L — SIGNIFICANT CHANGE UP (ref 96–108)
CO2 SERPL-SCNC: 28 MMOL/L — SIGNIFICANT CHANGE UP (ref 22–31)
CREAT SERPL-MCNC: 1.1 MG/DL — SIGNIFICANT CHANGE UP (ref 0.5–1.3)
GLUCOSE SERPL-MCNC: 78 MG/DL — SIGNIFICANT CHANGE UP (ref 70–99)
HBA1C BLD-MCNC: 5.7 % — HIGH (ref 4–5.6)
HCT VFR BLD CALC: 41.6 % — SIGNIFICANT CHANGE UP (ref 39–50)
HGB BLD-MCNC: 14.2 G/DL — SIGNIFICANT CHANGE UP (ref 13–17)
MCHC RBC-ENTMCNC: 30.5 PG — SIGNIFICANT CHANGE UP (ref 27–34)
MCHC RBC-ENTMCNC: 34.1 GM/DL — SIGNIFICANT CHANGE UP (ref 32–36)
MCV RBC AUTO: 89.5 FL — SIGNIFICANT CHANGE UP (ref 80–100)
PLATELET # BLD AUTO: 190 K/UL — SIGNIFICANT CHANGE UP (ref 150–400)
POTASSIUM SERPL-MCNC: 3.6 MMOL/L — SIGNIFICANT CHANGE UP (ref 3.5–5.3)
POTASSIUM SERPL-SCNC: 3.6 MMOL/L — SIGNIFICANT CHANGE UP (ref 3.5–5.3)
PROT SERPL-MCNC: 7.7 G/DL — SIGNIFICANT CHANGE UP (ref 6–8.3)
RBC # BLD: 4.65 M/UL — SIGNIFICANT CHANGE UP (ref 4.2–5.8)
RBC # FLD: 12.8 % — SIGNIFICANT CHANGE UP (ref 10.3–14.5)
SODIUM SERPL-SCNC: 142 MMOL/L — SIGNIFICANT CHANGE UP (ref 135–145)
WBC # BLD: 8.1 K/UL — SIGNIFICANT CHANGE UP (ref 3.8–10.5)
WBC # FLD AUTO: 8.1 K/UL — SIGNIFICANT CHANGE UP (ref 3.8–10.5)

## 2018-05-01 PROCEDURE — G0463: CPT

## 2018-05-01 PROCEDURE — 83036 HEMOGLOBIN GLYCOSYLATED A1C: CPT

## 2018-05-01 PROCEDURE — 80053 COMPREHEN METABOLIC PANEL: CPT

## 2018-05-01 PROCEDURE — 87389 HIV-1 AG W/HIV-1&-2 AB AG IA: CPT

## 2018-05-01 PROCEDURE — 93005 ELECTROCARDIOGRAM TRACING: CPT

## 2018-05-01 PROCEDURE — 86803 HEPATITIS C AB TEST: CPT

## 2018-05-01 PROCEDURE — 85027 COMPLETE CBC AUTOMATED: CPT

## 2018-05-01 PROCEDURE — 93010 ELECTROCARDIOGRAM REPORT: CPT | Mod: NC

## 2018-05-01 NOTE — H&P PST ADULT - ASSESSMENT
59 yo M for left knee arthroscopy      Med and cardiac cl pending    d/c Brilinta as per cardio  continue ASA      Advised no metformin night before sx

## 2018-05-01 NOTE — H&P PST ADULT - NSALCOHOLAMT_GEN_A_CORE_SD
Patient felt that elevated reading was due to being ill this past month, so please advise about  Dosing.     Agustina Castillo, RN  Triage Nurse   1-2 drinks

## 2018-05-01 NOTE — H&P PST ADULT - PSH
CAD (Coronary Artery Disease) of Artery Bypass Graft  2005 - March 17th  CAD (Coronary Artery Disease), Nonautologous Biological Bypass Graft    Eye abnormality  left eye amblyopia repaired in 1994  Hernia  repaired  Hernia, Inguinal    Hypercholesterolemia    Pneumonia due to Organism    S/P CABG x 3  2005  Status post coronary artery stent placement  Cardiac Stent Placed April 30th 2014 @ [facility]  Cardiac Stent X 2 placed January 15th 2017 (piggybacked) @ [facility]

## 2018-05-01 NOTE — H&P PST ADULT - HISTORY OF PRESENT ILLNESS
57 yo M for left knee arthroscopy  s/p fall at work 11/2017 w injury left knee 7/10  worse w twisting and turning    PT w minimal relief 59 yo M for left knee arthroscopy  s/p fall at work 11/2017 w injury left knee 7/10  worse w twisting and turning    PT w minimal relief   MRI  + medial meniscal tear

## 2018-05-02 LAB
HCV AB S/CO SERPL IA: 0.14 S/CO — SIGNIFICANT CHANGE UP
HCV AB SERPL-IMP: SIGNIFICANT CHANGE UP
HIV 1+2 AB+HIV1 P24 AG SERPL QL IA: SIGNIFICANT CHANGE UP

## 2018-05-08 ENCOUNTER — TRANSCRIPTION ENCOUNTER (OUTPATIENT)
Age: 58
End: 2018-05-08

## 2018-05-08 RX ORDER — SODIUM CHLORIDE 9 MG/ML
1000 INJECTION, SOLUTION INTRAVENOUS
Qty: 0 | Refills: 0 | Status: DISCONTINUED | OUTPATIENT
Start: 2018-05-09 | End: 2018-05-09

## 2018-05-09 ENCOUNTER — OUTPATIENT (OUTPATIENT)
Dept: OUTPATIENT SERVICES | Facility: HOSPITAL | Age: 58
LOS: 1 days | End: 2018-05-09
Payer: COMMERCIAL

## 2018-05-09 ENCOUNTER — RESULT REVIEW (OUTPATIENT)
Age: 58
End: 2018-05-09

## 2018-05-09 VITALS
WEIGHT: 227.08 LBS | HEART RATE: 84 BPM | HEIGHT: 71 IN | OXYGEN SATURATION: 95 % | SYSTOLIC BLOOD PRESSURE: 133 MMHG | RESPIRATION RATE: 11 BRPM | DIASTOLIC BLOOD PRESSURE: 87 MMHG | TEMPERATURE: 98 F

## 2018-05-09 VITALS
OXYGEN SATURATION: 95 % | SYSTOLIC BLOOD PRESSURE: 106 MMHG | RESPIRATION RATE: 15 BRPM | DIASTOLIC BLOOD PRESSURE: 65 MMHG | HEART RATE: 82 BPM

## 2018-05-09 DIAGNOSIS — Z95.5 PRESENCE OF CORONARY ANGIOPLASTY IMPLANT AND GRAFT: Chronic | ICD-10-CM

## 2018-05-09 DIAGNOSIS — Z95.1 PRESENCE OF AORTOCORONARY BYPASS GRAFT: Chronic | ICD-10-CM

## 2018-05-09 DIAGNOSIS — S83.232A COMPLEX TEAR OF MEDIAL MENISCUS, CURRENT INJURY, LEFT KNEE, INITIAL ENCOUNTER: ICD-10-CM

## 2018-05-09 DIAGNOSIS — Q15.9 CONGENITAL MALFORMATION OF EYE, UNSPECIFIED: Chronic | ICD-10-CM

## 2018-05-09 DIAGNOSIS — K46.9 UNSPECIFIED ABDOMINAL HERNIA WITHOUT OBSTRUCTION OR GANGRENE: Chronic | ICD-10-CM

## 2018-05-09 DIAGNOSIS — Z01.818 ENCOUNTER FOR OTHER PREPROCEDURAL EXAMINATION: ICD-10-CM

## 2018-05-09 PROCEDURE — 82962 GLUCOSE BLOOD TEST: CPT

## 2018-05-09 PROCEDURE — 88304 TISSUE EXAM BY PATHOLOGIST: CPT

## 2018-05-09 PROCEDURE — 88304 TISSUE EXAM BY PATHOLOGIST: CPT | Mod: 26

## 2018-05-09 PROCEDURE — 29880 ARTHRS KNE SRG MNISECTMY M&L: CPT | Mod: LT

## 2018-05-09 RX ORDER — OXYCODONE HYDROCHLORIDE 5 MG/1
5 TABLET ORAL ONCE
Qty: 0 | Refills: 0 | Status: DISCONTINUED | OUTPATIENT
Start: 2018-05-09 | End: 2018-05-09

## 2018-05-09 RX ORDER — SODIUM CHLORIDE 9 MG/ML
1000 INJECTION, SOLUTION INTRAVENOUS
Qty: 0 | Refills: 0 | Status: DISCONTINUED | OUTPATIENT
Start: 2018-05-09 | End: 2018-05-09

## 2018-05-09 RX ORDER — METOCLOPRAMIDE HCL 10 MG
10 TABLET ORAL ONCE
Qty: 0 | Refills: 0 | Status: DISCONTINUED | OUTPATIENT
Start: 2018-05-09 | End: 2018-05-09

## 2018-05-09 RX ORDER — HYDROMORPHONE HYDROCHLORIDE 2 MG/ML
0.5 INJECTION INTRAMUSCULAR; INTRAVENOUS; SUBCUTANEOUS
Qty: 0 | Refills: 0 | Status: DISCONTINUED | OUTPATIENT
Start: 2018-05-09 | End: 2018-05-09

## 2018-05-09 RX ORDER — OXYCODONE HYDROCHLORIDE 5 MG/1
1 TABLET ORAL
Qty: 20 | Refills: 0 | OUTPATIENT
Start: 2018-05-09

## 2018-05-09 RX ORDER — CEFAZOLIN SODIUM 1 G
2000 VIAL (EA) INJECTION ONCE
Qty: 0 | Refills: 0 | Status: COMPLETED | OUTPATIENT
Start: 2018-05-09 | End: 2018-05-09

## 2018-05-09 RX ORDER — SODIUM CHLORIDE 9 MG/ML
1000 INJECTION, SOLUTION INTRAVENOUS
Qty: 0 | Refills: 0 | Status: DISCONTINUED | OUTPATIENT
Start: 2018-05-09 | End: 2018-05-24

## 2018-05-09 RX ADMIN — SODIUM CHLORIDE 125 MILLILITER(S): 9 INJECTION, SOLUTION INTRAVENOUS at 16:40

## 2018-05-09 RX ADMIN — SODIUM CHLORIDE 30 MILLILITER(S): 9 INJECTION, SOLUTION INTRAVENOUS at 12:03

## 2018-05-09 NOTE — BRIEF OPERATIVE NOTE - PROCEDURE
<<-----Click on this checkbox to enter Procedure Knee arthroscopy, left  05/09/2018  partial medial and lateral meniscectomy, chondroplasty, synovectomy  Active  HATTIE

## 2018-05-09 NOTE — ASU DISCHARGE PLAN (ADULT/PEDIATRIC). - NURSING INSTRUCTIONS
take wth food drink plenty of fluids take wth food drink plenty of fluids  Remember to wash your hands frequently and to take your antibiotics as ordered (if prescribed). Call your surgeon if your wound or surgical site shows any sign of infection such as: 1. Fever/chills 2.  Pus or drainage,  3. bad smell coming from area 4. hot to touch 5. redness or swelling 6. painful or sore to touch.

## 2018-05-09 NOTE — BRIEF OPERATIVE NOTE - POST-OP DX
Medial meniscus tear  05/09/2018  lateral meniscus tear, synovitis, chondromalacia  Active  Kana Wise

## 2018-05-09 NOTE — ASU PATIENT PROFILE, ADULT - PMH
Arthritis    Back pain    CAD (coronary artery disease)  CABG x3  Complex tear of medial meniscus, current injury, left knee, initial encounter    HLD (hyperlipidemia)    HTN (hypertension)    Prediabetes    S/P CABG

## 2018-05-09 NOTE — ASU DISCHARGE PLAN (ADULT/PEDIATRIC). - MEDICATION SUMMARY - MEDICATIONS TO TAKE
I will START or STAY ON the medications listed below when I get home from the hospital:    acetaminophen-oxyCODONE 325 mg-5 mg oral tablet  -- 1 tab(s) by mouth every 4 hours PRN pain MDD:6  -- Caution federal law prohibits the transfer of this drug to any person other  than the person for whom it was prescribed.  May cause drowsiness.  Alcohol may intensify this effect.  Use care when operating dangerous machinery.  This prescription cannot be refilled.  This product contains acetaminophen.  Do not use  with any other product containing acetaminophen to prevent possible liver damage.  Using more of this medication than prescribed may cause serious breathing problems.    -- Indication: For pain    Aspirin Low Strength 81 mg oral tablet  -- 1 tab(s) by mouth once a day (at bedtime)  -- Indication: For per pmd    irbesartan 75 mg oral tablet  -- 1 tab(s) by mouth once a day - IN AM  -- Indication: For per pmd    sertraline 100 mg oral tablet  -- 1 tab(s) by mouth once a day - IN AM  -- Indication: For per pmd    metFORMIN 500 mg oral tablet  -- 1 tab(s) by mouth 2 times a day  -- Indication: For per pmd    Vascepa 1 g oral capsule  -- 2 cap(s) by mouth 2 times a day  -- Indication: For per pmd    rosuvastatin 40 mg oral tablet  -- 1 tab(s) by mouth once a day (at bedtime)  -- Indication: For per pmd    niacin 1000 mg oral tablet, extended release  -- 1 tab(s) by mouth once a day (at bedtime)  -- Indication: For per pmd    Brilinta (ticagrelor) 90 mg oral tablet  -- 1 tab(s) by mouth 2 times a day  -- Indication: For per pmd    Metoprolol Succinate ER 25 mg oral tablet, extended release  -- 1 tab(s) by mouth once a day - IN AM  -- Indication: For per pmd

## 2018-05-09 NOTE — ASU PATIENT PROFILE, ADULT - PSH
CAD (Coronary Artery Disease) of Artery Bypass Graft  2005 - March 17th  CAD (Coronary Artery Disease), Nonautologous Biological Bypass Graft    Eye abnormality  left eye amblyopia repaired in 1994  Hernia  repaired  Hernia, Inguinal    Hypercholesterolemia    Pneumonia due to Organism    S/P CABG x 3  2005  Status post coronary artery stent placement  Cardiac Stent Placed April 30th 2014 @ [facility]  Cardiac Stent X 2 placed January 15th 2017 (piggybacked) @ [facility]

## 2018-05-11 LAB — SURGICAL PATHOLOGY FINAL REPORT - CH: SIGNIFICANT CHANGE UP

## 2018-07-30 PROBLEM — R73.03 PREDIABETES: Chronic | Status: ACTIVE | Noted: 2018-04-30

## 2018-07-30 PROBLEM — M54.9 DORSALGIA, UNSPECIFIED: Chronic | Status: ACTIVE | Noted: 2018-05-01

## 2018-07-30 PROBLEM — M19.90 UNSPECIFIED OSTEOARTHRITIS, UNSPECIFIED SITE: Chronic | Status: ACTIVE | Noted: 2018-05-01

## 2018-07-30 PROBLEM — S83.232A COMPLEX TEAR OF MEDIAL MENISCUS, CURRENT INJURY, LEFT KNEE, INITIAL ENCOUNTER: Chronic | Status: ACTIVE | Noted: 2018-04-30

## 2018-08-22 ENCOUNTER — APPOINTMENT (OUTPATIENT)
Dept: CARDIOLOGY | Facility: CLINIC | Age: 58
End: 2018-08-22
Payer: COMMERCIAL

## 2018-08-22 ENCOUNTER — NON-APPOINTMENT (OUTPATIENT)
Age: 58
End: 2018-08-22

## 2018-08-22 VITALS
OXYGEN SATURATION: 98 % | HEIGHT: 71 IN | HEART RATE: 90 BPM | SYSTOLIC BLOOD PRESSURE: 125 MMHG | BODY MASS INDEX: 31.5 KG/M2 | DIASTOLIC BLOOD PRESSURE: 79 MMHG | RESPIRATION RATE: 16 BRPM | WEIGHT: 225 LBS

## 2018-08-22 PROCEDURE — 93000 ELECTROCARDIOGRAM COMPLETE: CPT

## 2018-08-22 PROCEDURE — 99214 OFFICE O/P EST MOD 30 MIN: CPT

## 2018-08-28 ENCOUNTER — APPOINTMENT (OUTPATIENT)
Dept: CV DIAGNOSITCS | Facility: HOSPITAL | Age: 58
End: 2018-08-28
Payer: COMMERCIAL

## 2018-08-28 ENCOUNTER — APPOINTMENT (OUTPATIENT)
Dept: CV DIAGNOSTICS | Facility: HOSPITAL | Age: 58
End: 2018-08-28
Payer: COMMERCIAL

## 2018-08-28 ENCOUNTER — OUTPATIENT (OUTPATIENT)
Dept: OUTPATIENT SERVICES | Facility: HOSPITAL | Age: 58
LOS: 1 days | End: 2018-08-28

## 2018-08-28 DIAGNOSIS — K46.9 UNSPECIFIED ABDOMINAL HERNIA WITHOUT OBSTRUCTION OR GANGRENE: Chronic | ICD-10-CM

## 2018-08-28 DIAGNOSIS — I25.10 ATHEROSCLEROTIC HEART DISEASE OF NATIVE CORONARY ARTERY WITHOUT ANGINA PECTORIS: ICD-10-CM

## 2018-08-28 DIAGNOSIS — Q15.9 CONGENITAL MALFORMATION OF EYE, UNSPECIFIED: Chronic | ICD-10-CM

## 2018-08-28 DIAGNOSIS — Z95.1 PRESENCE OF AORTOCORONARY BYPASS GRAFT: Chronic | ICD-10-CM

## 2018-08-28 DIAGNOSIS — Z95.5 PRESENCE OF CORONARY ANGIOPLASTY IMPLANT AND GRAFT: Chronic | ICD-10-CM

## 2018-08-28 PROCEDURE — 93018 CV STRESS TEST I&R ONLY: CPT | Mod: GC

## 2018-08-28 PROCEDURE — 93016 CV STRESS TEST SUPVJ ONLY: CPT | Mod: GC

## 2018-08-28 PROCEDURE — 78452 HT MUSCLE IMAGE SPECT MULT: CPT | Mod: 26

## 2018-08-28 PROCEDURE — 93306 TTE W/DOPPLER COMPLETE: CPT | Mod: 26

## 2018-08-28 NOTE — ASU PATIENT PROFILE, ADULT - NS TRANSFER PATIENT BELONGINGS
Spoke to pt in regards to questions about preop instructions, finance and rescheduling surgery date, as pt believes [place].     Instructed that preop will take place closer to the week of surgery date, arrival time will be noted the day before and message sent to Aubree regarding finance with verbal understanding. AMANDA PEREZ    ----- Message from Donnie Rivas sent at 8/28/2018 11:28 AM CDT -----  [phone]//pt states that he needs to speak with nurse in ref to her surgery date//please call//thank you     Clothing

## 2018-09-05 ENCOUNTER — INPATIENT (INPATIENT)
Facility: HOSPITAL | Age: 58
LOS: 0 days | Discharge: HOME | End: 2018-09-06
Attending: INTERNAL MEDICINE | Admitting: INTERNAL MEDICINE
Payer: COMMERCIAL

## 2018-09-05 VITALS
SYSTOLIC BLOOD PRESSURE: 118 MMHG | OXYGEN SATURATION: 98 % | DIASTOLIC BLOOD PRESSURE: 80 MMHG | RESPIRATION RATE: 17 BRPM | HEART RATE: 81 BPM | TEMPERATURE: 98 F

## 2018-09-05 DIAGNOSIS — R07.9 CHEST PAIN, UNSPECIFIED: ICD-10-CM

## 2018-09-05 DIAGNOSIS — K46.9 UNSPECIFIED ABDOMINAL HERNIA WITHOUT OBSTRUCTION OR GANGRENE: Chronic | ICD-10-CM

## 2018-09-05 DIAGNOSIS — Z95.5 PRESENCE OF CORONARY ANGIOPLASTY IMPLANT AND GRAFT: Chronic | ICD-10-CM

## 2018-09-05 DIAGNOSIS — Q15.9 CONGENITAL MALFORMATION OF EYE, UNSPECIFIED: Chronic | ICD-10-CM

## 2018-09-05 DIAGNOSIS — Z95.1 PRESENCE OF AORTOCORONARY BYPASS GRAFT: Chronic | ICD-10-CM

## 2018-09-05 LAB
BUN SERPL-MCNC: 16 MG/DL — SIGNIFICANT CHANGE UP (ref 7–23)
CALCIUM SERPL-MCNC: 9.8 MG/DL — SIGNIFICANT CHANGE UP (ref 8.4–10.5)
CHLORIDE SERPL-SCNC: 103 MMOL/L — SIGNIFICANT CHANGE UP (ref 98–107)
CO2 SERPL-SCNC: 23 MMOL/L — SIGNIFICANT CHANGE UP (ref 22–31)
CREAT SERPL-MCNC: 1.03 MG/DL — SIGNIFICANT CHANGE UP (ref 0.5–1.3)
GLUCOSE BLDC GLUCOMTR-MCNC: 100 MG/DL — HIGH (ref 70–99)
GLUCOSE SERPL-MCNC: 103 MG/DL — HIGH (ref 70–99)
HCT VFR BLD CALC: 38.6 % — LOW (ref 39–50)
HGB BLD-MCNC: 13.1 G/DL — SIGNIFICANT CHANGE UP (ref 13–17)
MCHC RBC-ENTMCNC: 30.8 PG — SIGNIFICANT CHANGE UP (ref 27–34)
MCHC RBC-ENTMCNC: 33.9 % — SIGNIFICANT CHANGE UP (ref 32–36)
MCV RBC AUTO: 90.8 FL — SIGNIFICANT CHANGE UP (ref 80–100)
NRBC # FLD: 0 — SIGNIFICANT CHANGE UP
PLATELET # BLD AUTO: 170 K/UL — SIGNIFICANT CHANGE UP (ref 150–400)
PMV BLD: 10.4 FL — SIGNIFICANT CHANGE UP (ref 7–13)
POTASSIUM SERPL-MCNC: 4.1 MMOL/L — SIGNIFICANT CHANGE UP (ref 3.5–5.3)
POTASSIUM SERPL-SCNC: 4.1 MMOL/L — SIGNIFICANT CHANGE UP (ref 3.5–5.3)
RBC # BLD: 4.25 M/UL — SIGNIFICANT CHANGE UP (ref 4.2–5.8)
RBC # FLD: 13.5 % — SIGNIFICANT CHANGE UP (ref 10.3–14.5)
SODIUM SERPL-SCNC: 139 MMOL/L — SIGNIFICANT CHANGE UP (ref 135–145)
WBC # BLD: 8.53 K/UL — SIGNIFICANT CHANGE UP (ref 3.8–10.5)
WBC # FLD AUTO: 8.53 K/UL — SIGNIFICANT CHANGE UP (ref 3.8–10.5)

## 2018-09-05 PROCEDURE — 76937 US GUIDE VASCULAR ACCESS: CPT | Mod: 26

## 2018-09-05 PROCEDURE — 93010 ELECTROCARDIOGRAM REPORT: CPT | Mod: 76

## 2018-09-05 PROCEDURE — 99152 MOD SED SAME PHYS/QHP 5/>YRS: CPT

## 2018-09-05 PROCEDURE — 93455 CORONARY ART/GRFT ANGIO S&I: CPT | Mod: 26,59

## 2018-09-05 PROCEDURE — 92937 PRQ TRLUML REVSC CAB GRF 1: CPT | Mod: LC

## 2018-09-05 RX ORDER — SODIUM CHLORIDE 9 MG/ML
3 INJECTION INTRAMUSCULAR; INTRAVENOUS; SUBCUTANEOUS EVERY 8 HOURS
Qty: 0 | Refills: 0 | Status: DISCONTINUED | OUTPATIENT
Start: 2018-09-05 | End: 2018-09-06

## 2018-09-05 RX ORDER — INFLUENZA VIRUS VACCINE 15; 15; 15; 15 UG/.5ML; UG/.5ML; UG/.5ML; UG/.5ML
0.5 SUSPENSION INTRAMUSCULAR ONCE
Qty: 0 | Refills: 0 | Status: DISCONTINUED | OUTPATIENT
Start: 2018-09-05 | End: 2018-09-06

## 2018-09-05 RX ORDER — DEXTROSE 50 % IN WATER 50 %
25 SYRINGE (ML) INTRAVENOUS ONCE
Qty: 0 | Refills: 0 | Status: DISCONTINUED | OUTPATIENT
Start: 2018-09-05 | End: 2018-09-06

## 2018-09-05 RX ORDER — METOPROLOL TARTRATE 50 MG
25 TABLET ORAL DAILY
Qty: 0 | Refills: 0 | Status: DISCONTINUED | OUTPATIENT
Start: 2018-09-05 | End: 2018-09-06

## 2018-09-05 RX ORDER — SERTRALINE 25 MG/1
100 TABLET, FILM COATED ORAL DAILY
Qty: 0 | Refills: 0 | Status: DISCONTINUED | OUTPATIENT
Start: 2018-09-05 | End: 2018-09-06

## 2018-09-05 RX ORDER — LOSARTAN POTASSIUM 100 MG/1
25 TABLET, FILM COATED ORAL DAILY
Qty: 0 | Refills: 0 | Status: DISCONTINUED | OUTPATIENT
Start: 2018-09-05 | End: 2018-09-06

## 2018-09-05 RX ORDER — METFORMIN HYDROCHLORIDE 850 MG/1
1 TABLET ORAL
Qty: 0 | Refills: 0 | COMMUNITY

## 2018-09-05 RX ORDER — ATORVASTATIN CALCIUM 80 MG/1
80 TABLET, FILM COATED ORAL AT BEDTIME
Qty: 0 | Refills: 0 | Status: DISCONTINUED | OUTPATIENT
Start: 2018-09-05 | End: 2018-09-06

## 2018-09-05 RX ORDER — ASPIRIN/CALCIUM CARB/MAGNESIUM 324 MG
81 TABLET ORAL DAILY
Qty: 0 | Refills: 0 | Status: DISCONTINUED | OUTPATIENT
Start: 2018-09-06 | End: 2018-09-06

## 2018-09-05 RX ORDER — DEXTROSE 50 % IN WATER 50 %
15 SYRINGE (ML) INTRAVENOUS ONCE
Qty: 0 | Refills: 0 | Status: DISCONTINUED | OUTPATIENT
Start: 2018-09-05 | End: 2018-09-06

## 2018-09-05 RX ORDER — GLUCAGON INJECTION, SOLUTION 0.5 MG/.1ML
1 INJECTION, SOLUTION SUBCUTANEOUS ONCE
Qty: 0 | Refills: 0 | Status: DISCONTINUED | OUTPATIENT
Start: 2018-09-05 | End: 2018-09-06

## 2018-09-05 RX ORDER — DEXTROSE 50 % IN WATER 50 %
12.5 SYRINGE (ML) INTRAVENOUS ONCE
Qty: 0 | Refills: 0 | Status: DISCONTINUED | OUTPATIENT
Start: 2018-09-05 | End: 2018-09-06

## 2018-09-05 RX ORDER — SODIUM CHLORIDE 9 MG/ML
1000 INJECTION, SOLUTION INTRAVENOUS
Qty: 0 | Refills: 0 | Status: DISCONTINUED | OUTPATIENT
Start: 2018-09-05 | End: 2018-09-06

## 2018-09-05 RX ORDER — TICAGRELOR 90 MG/1
90 TABLET ORAL
Qty: 0 | Refills: 0 | Status: DISCONTINUED | OUTPATIENT
Start: 2018-09-05 | End: 2018-09-06

## 2018-09-05 RX ADMIN — ATORVASTATIN CALCIUM 80 MILLIGRAM(S): 80 TABLET, FILM COATED ORAL at 22:40

## 2018-09-05 RX ADMIN — TICAGRELOR 90 MILLIGRAM(S): 90 TABLET ORAL at 22:40

## 2018-09-05 RX ADMIN — SODIUM CHLORIDE 3 MILLILITER(S): 9 INJECTION INTRAMUSCULAR; INTRAVENOUS; SUBCUTANEOUS at 22:40

## 2018-09-05 RX ADMIN — LOSARTAN POTASSIUM 25 MILLIGRAM(S): 100 TABLET, FILM COATED ORAL at 22:40

## 2018-09-05 RX ADMIN — SODIUM CHLORIDE 3 MILLILITER(S): 9 INJECTION INTRAMUSCULAR; INTRAVENOUS; SUBCUTANEOUS at 18:40

## 2018-09-05 NOTE — H&P CARDIOLOGY - HISTORY OF PRESENT ILLNESS
58 year old male with HTN, hyperlipidemia, pre-diabetes (A1c 5.6), known CAD with 3vCABG 2005 (LIMA to LAD, SVG to OM, left radial to Diag) with subsequent promus premier stent pSVG to OM 2014 and promus premier stent x2 SVG to OM 2017 who presented to his Cardiologist for pre-operative cardiac clearance prior to left shoulder surgery. Underwent a Stress test with EF 56%, large, moderate defect in lateral wall that is partially reversible, suggestive of infarction with moderate keny-infarct ischemia.   Admits to intermittent SOB walking up 2 flight of stairs, carrying packages relieved with rest. Admits to left shoulder pain radiating to his side for which he always relates to his shoulder injury, torn labrium.   In light of patients CAD history, symptoms and abnormal noninvasive test findings there is high suspicion for CAD progression. Patient is now referred to [facility] for a cardiac catheterization with possible PTCA/stent as part of pre-op cardiac clearance.

## 2018-09-05 NOTE — PROGRESS NOTE ADULT - SUBJECTIVE AND OBJECTIVE BOX
R groin check s/p cardiac cath:  > pt denies any complaints at the site,   > site is C/D/I, no hematoma, no TTP, R femoral, R popliteal and R DP pulses-- 2 +.  > cont monitoring,

## 2018-09-05 NOTE — H&P CARDIOLOGY - PMH
Arthritis    Back pain    CAD (coronary artery disease)  CABG x3  Complex tear of medial meniscus, current injury, left knee, initial encounter    HLD (hyperlipidemia)    HTN (hypertension)    Prediabetes    S/P CABG    Stented coronary artery  promus premier stent pSVG to OM 2014 and promus premier stent x2 SVG to OM 2017

## 2018-09-05 NOTE — H&P CARDIOLOGY - FAMILY HISTORY
Family history of prostate cancer in father, Father -  age 86     Mother  Still living? Yes, Estimated age: 81-90  Hypertension, Age at diagnosis: Age Unknown     Father  Still living? No  Family history of coronary artery disease in father, Age at diagnosis: Age Unknown

## 2018-09-06 ENCOUNTER — TRANSCRIPTION ENCOUNTER (OUTPATIENT)
Age: 58
End: 2018-09-06

## 2018-09-06 VITALS
SYSTOLIC BLOOD PRESSURE: 123 MMHG | DIASTOLIC BLOOD PRESSURE: 64 MMHG | HEART RATE: 89 BPM | RESPIRATION RATE: 18 BRPM | OXYGEN SATURATION: 98 % | TEMPERATURE: 98 F

## 2018-09-06 DIAGNOSIS — E11.9 TYPE 2 DIABETES MELLITUS WITHOUT COMPLICATIONS: ICD-10-CM

## 2018-09-06 DIAGNOSIS — I25.10 ATHEROSCLEROTIC HEART DISEASE OF NATIVE CORONARY ARTERY WITHOUT ANGINA PECTORIS: ICD-10-CM

## 2018-09-06 DIAGNOSIS — I10 ESSENTIAL (PRIMARY) HYPERTENSION: ICD-10-CM

## 2018-09-06 DIAGNOSIS — E78.5 HYPERLIPIDEMIA, UNSPECIFIED: ICD-10-CM

## 2018-09-06 PROBLEM — Z95.5 PRESENCE OF CORONARY ANGIOPLASTY IMPLANT AND GRAFT: Chronic | Status: ACTIVE | Noted: 2018-09-05

## 2018-09-06 LAB
BASOPHILS # BLD AUTO: 0.03 K/UL — SIGNIFICANT CHANGE UP (ref 0–0.2)
BASOPHILS NFR BLD AUTO: 0.4 % — SIGNIFICANT CHANGE UP (ref 0–2)
BUN SERPL-MCNC: 13 MG/DL — SIGNIFICANT CHANGE UP (ref 7–23)
CALCIUM SERPL-MCNC: 9.7 MG/DL — SIGNIFICANT CHANGE UP (ref 8.4–10.5)
CHLORIDE SERPL-SCNC: 104 MMOL/L — SIGNIFICANT CHANGE UP (ref 98–107)
CO2 SERPL-SCNC: 22 MMOL/L — SIGNIFICANT CHANGE UP (ref 22–31)
CREAT SERPL-MCNC: 1.05 MG/DL — SIGNIFICANT CHANGE UP (ref 0.5–1.3)
EOSINOPHIL # BLD AUTO: 0.06 K/UL — SIGNIFICANT CHANGE UP (ref 0–0.5)
EOSINOPHIL NFR BLD AUTO: 0.7 % — SIGNIFICANT CHANGE UP (ref 0–6)
GLUCOSE SERPL-MCNC: 150 MG/DL — HIGH (ref 70–99)
HCT VFR BLD CALC: 38.7 % — LOW (ref 39–50)
HGB BLD-MCNC: 13.3 G/DL — SIGNIFICANT CHANGE UP (ref 13–17)
IMM GRANULOCYTES # BLD AUTO: 0.02 # — SIGNIFICANT CHANGE UP
IMM GRANULOCYTES NFR BLD AUTO: 0.2 % — SIGNIFICANT CHANGE UP (ref 0–1.5)
LYMPHOCYTES # BLD AUTO: 1.75 K/UL — SIGNIFICANT CHANGE UP (ref 1–3.3)
LYMPHOCYTES # BLD AUTO: 20.7 % — SIGNIFICANT CHANGE UP (ref 13–44)
MCHC RBC-ENTMCNC: 31.1 PG — SIGNIFICANT CHANGE UP (ref 27–34)
MCHC RBC-ENTMCNC: 34.4 % — SIGNIFICANT CHANGE UP (ref 32–36)
MCV RBC AUTO: 90.6 FL — SIGNIFICANT CHANGE UP (ref 80–100)
MONOCYTES # BLD AUTO: 0.35 K/UL — SIGNIFICANT CHANGE UP (ref 0–0.9)
MONOCYTES NFR BLD AUTO: 4.1 % — SIGNIFICANT CHANGE UP (ref 2–14)
NEUTROPHILS # BLD AUTO: 6.24 K/UL — SIGNIFICANT CHANGE UP (ref 1.8–7.4)
NEUTROPHILS NFR BLD AUTO: 73.9 % — SIGNIFICANT CHANGE UP (ref 43–77)
NRBC # FLD: 0 — SIGNIFICANT CHANGE UP
PLATELET # BLD AUTO: 177 K/UL — SIGNIFICANT CHANGE UP (ref 150–400)
PMV BLD: 11 FL — SIGNIFICANT CHANGE UP (ref 7–13)
POTASSIUM SERPL-MCNC: 3.7 MMOL/L — SIGNIFICANT CHANGE UP (ref 3.5–5.3)
POTASSIUM SERPL-SCNC: 3.7 MMOL/L — SIGNIFICANT CHANGE UP (ref 3.5–5.3)
RBC # BLD: 4.27 M/UL — SIGNIFICANT CHANGE UP (ref 4.2–5.8)
RBC # FLD: 13.4 % — SIGNIFICANT CHANGE UP (ref 10.3–14.5)
SODIUM SERPL-SCNC: 139 MMOL/L — SIGNIFICANT CHANGE UP (ref 135–145)
WBC # BLD: 8.45 K/UL — SIGNIFICANT CHANGE UP (ref 3.8–10.5)
WBC # FLD AUTO: 8.45 K/UL — SIGNIFICANT CHANGE UP (ref 3.8–10.5)

## 2018-09-06 RX ORDER — METFORMIN HYDROCHLORIDE 850 MG/1
1 TABLET ORAL
Qty: 0 | Refills: 0 | COMMUNITY

## 2018-09-06 RX ORDER — ACETAMINOPHEN 500 MG
650 TABLET ORAL ONCE
Qty: 0 | Refills: 0 | Status: COMPLETED | OUTPATIENT
Start: 2018-09-06 | End: 2018-09-06

## 2018-09-06 RX ORDER — CHOLECALCIFEROL (VITAMIN D3) 125 MCG
1 CAPSULE ORAL
Qty: 0 | Refills: 0 | COMMUNITY

## 2018-09-06 RX ADMIN — LOSARTAN POTASSIUM 25 MILLIGRAM(S): 100 TABLET, FILM COATED ORAL at 05:44

## 2018-09-06 RX ADMIN — SODIUM CHLORIDE 3 MILLILITER(S): 9 INJECTION INTRAMUSCULAR; INTRAVENOUS; SUBCUTANEOUS at 05:44

## 2018-09-06 RX ADMIN — Medication 650 MILLIGRAM(S): at 10:52

## 2018-09-06 RX ADMIN — Medication 650 MILLIGRAM(S): at 10:04

## 2018-09-06 RX ADMIN — TICAGRELOR 90 MILLIGRAM(S): 90 TABLET ORAL at 05:44

## 2018-09-06 RX ADMIN — Medication 25 MILLIGRAM(S): at 05:44

## 2018-09-06 NOTE — PROGRESS NOTE ADULT - SUBJECTIVE AND OBJECTIVE BOX
HPI:  58 y.o M HTN, HLD, pre-diabetes (A1c 5.6), known CAD with 3vCABG 2005 (LIMA to LAD, SVG to OM, left radial to Diag) with subsequent promus premier stent pSVG to OM 2014 and promus premier stent x2 SVG to OM 2017   Presented for cardiac cath after a positive stress test. Had dyspnea on exertion. Had POBA of proximal SVG graft to the OM at the site of 80% restenosis of a previous stent.     No events overnight. No chest pain. Able to ambulate without symptoms. No pain at the access site.    Review Of Systems:  10 point review of systems is otherwise negative except as mentioned above               Medications:  aspirin enteric coated 81 milliGRAM(s) Oral daily  atorvastatin 80 milliGRAM(s) Oral at bedtime  dextrose 40% Gel 15 Gram(s) Oral once PRN  dextrose 5%. 1000 milliLiter(s) IV Continuous <Continuous>  dextrose 50% Injectable 12.5 Gram(s) IV Push once  dextrose 50% Injectable 25 Gram(s) IV Push once  dextrose 50% Injectable 25 Gram(s) IV Push once  glucagon  Injectable 1 milliGRAM(s) IntraMuscular once PRN  influenza   Vaccine 0.5 milliLiter(s) IntraMuscular once  losartan 25 milliGRAM(s) Oral daily  metoprolol succinate ER 25 milliGRAM(s) Oral daily  sertraline 100 milliGRAM(s) Oral daily  sodium chloride 0.9% lock flush 3 milliLiter(s) IV Push every 8 hours  ticagrelor 90 milliGRAM(s) Oral two times a day    PMH/PSH/FH/SH: [ ] Unchanged  Vitals:  T(C): 36.8 (09-06-18 @ 05:38), Max: 36.9 (09-05-18 @ 18:24)  HR: 82 (09-06-18 @ 05:38) (72 - 124)  BP: 117/75 (09-06-18 @ 05:38) (117/75 - 123/75)  BP(mean): --  RR: 17 (09-06-18 @ 05:38) (17 - 17)  SpO2: 97% (09-06-18 @ 05:38) (97% - 99%)    Physical Exam:  Appearance: [ x] Normal [ x] NAD  Eyes: [ x] PERRL [x ] EOMI  HEENT: [x ] Normal oral muscosa [x ]NC/AT  Cardiovascular: [x ] S1 [x ] S2 [ x] RRR [ x] No m/r/g  [x]No edema, No JVD, Right femoral access site without hematoma or ecchymosis. 2+ Femoral and DP pulses bilaterally.   Respiratory: [x ] Clear to auscultation bilaterally  Gastrointestinal: [x ] Soft [x ] Non-tender [x ] Non-distended [x ] BS+  Musculoskeletal: [x ] No clubbing [x ] No joint deformity   Neurologic: [x ] Non-focal  Lymphatic: [x ] No lymphadenopathy  Psychiatry: [x ] AAOx3 [x ] Mood & affect appropriate  Skin: [x ] No rashes [x ] No ecchymoses [x ] No cyanosis    09-06    139  |  104  |  13  ----------------------------<  150<H>  3.7   |  22  |  1.05    Ca    9.7      06 Sep 2018 10:07      Telemetry: No events on tele.

## 2018-09-06 NOTE — DISCHARGE NOTE ADULT - HOSPITAL COURSE
58 year old male with HTN, hyperlipidemia, pre-diabetes (A1c 5.6), known CAD with 3vCABG 2005 (LIMA to LAD, SVG to OM, left radial to Diag) with subsequent promus premier stent pSVG to OM 2014 and promus premier stent x2 SVG to OM 2017 who presented to his Cardiologist for pre-operative cardiac clearance prior to left shoulder surgery and abnormal stress test s/p CATH: PTCA SVG to OM1 80 in-stent restenosis to residual 20%; LIMA to LAD/Diag1 patent; dLM 30-40, pLAD 40-50, OM1 100, RPL 50; RFA access without pain ,swelling bleeding, hematoma, PUlses 2+_ bilaterally. PT stable for discharge home as per DR Medina

## 2018-09-06 NOTE — DISCHARGE NOTE ADULT - PLAN OF CARE
prevent worsening of coronary artery disease Follow up with DR Medina in one week for official cardiac clearance for shoulder surgery  1800 calorie diabetic diet/ low salt, low fat , low cholesterol   Continue aspirin, brilinta, metoprolol. irbesartan, crestor low salt, low fat, low cholesterol low salt, low fat, low cholesterol   continue crestor continue metformin but hold until 9./8/2018  continue actos

## 2018-09-06 NOTE — DISCHARGE NOTE ADULT - CARE PLAN
Principal Discharge DX:	CAD (coronary artery disease)  Goal:	prevent worsening of coronary artery disease  Assessment and plan of treatment:	Follow up with DR Medina in one week for official cardiac clearance for shoulder surgery  1800 calorie diabetic diet/ low salt, low fat , low cholesterol   Continue aspirin, brilinta, metoprolol. irbesartan, crestor  Secondary Diagnosis:	HTN (hypertension)  Assessment and plan of treatment:	low salt, low fat, low cholesterol  Secondary Diagnosis:	HLD (hyperlipidemia)  Assessment and plan of treatment:	low salt, low fat, low cholesterol   continue crestor  Secondary Diagnosis:	Diabetes  Assessment and plan of treatment:	continue metformin but hold until 9./8/2018  continue actos

## 2018-09-06 NOTE — DISCHARGE NOTE ADULT - NS AS ACTIVITY OBS
Do not drive or operate machinery/No Heavy lifting/straining/Do not make important decisions/Showering allowed/Walking-Indoors allowed

## 2018-09-06 NOTE — DISCHARGE NOTE ADULT - PATIENT PORTAL LINK FT
You can access the [facility] Patient Portal, offered by [facility], by registering with the following website: [email or web address]

## 2018-09-06 NOTE — PROGRESS NOTE ADULT - ASSESSMENT
58 y.o M HTN, HLD, pre-diabetes (A1c 5.6), known CAD with 3vCABG 2005 (LIMA to LAD, SVG to OM, left radial to Diag) with subsequent promus premier stent pSVG to OM 2014 and promus premier stent x2 SVG to OM 2017   Presented for cardiac cath after a positive stress test. Had dyspnea on exertion. Had POBA of proximal SVG graft to the OM at the site of 80% restenosis of a previous stent.     1. CAD s/p PCI POBA of proximal SVG graft to the OM at the site of 80% restenosis of a previous stent.   No access site complications.   Continue ASA 81mg daily, Ticagrelor (Brillinta) 90mg BID, atorvastatin 80mg daily, metoprolol and losartan.     Follow up with Dr. Medina within 1-2 weeks. The patient will discuss with Dr. Medina at that time regarding the duration of brillinta.

## 2018-09-06 NOTE — DISCHARGE NOTE ADULT - MEDICATION SUMMARY - MEDICATIONS TO TAKE
I will START or STAY ON the medications listed below when I get home from the hospital:    Aspirin Low Strength 81 mg oral tablet  -- 1 tab(s) by mouth once a day (at bedtime)  -- Indication: For CAD    irbesartan 75 mg oral tablet  -- 1 tab(s) by mouth once a day - IN AM  -- Indication: For HTN    sertraline 100 mg oral tablet  -- 1 tab(s) by mouth once a day - IN AM  -- Indication: For depression    pioglitazone 30 mg oral tablet  -- 1 tab(s) by mouth once a day  -- Indication: For DM    metFORMIN 500 mg oral tablet  -- 1 tab(s) by mouth 2 times a day-do not take until 9/8/2018   -- Indication: For DM    niacin 1000 mg oral tablet, extended release  -- 1 tab(s) by mouth once a day (at bedtime)  -- Indication: For hyperlipidemia    Vascepa 1 g oral capsule  -- 2 cap(s) by mouth 2 times a day  -- Indication: For hyperlipidemia    rosuvastatin 40 mg oral tablet  -- 1 tab(s) by mouth once a day (at bedtime)  -- Indication: For hyperlipidemia    Brilinta (ticagrelor) 90 mg oral tablet  -- 1 tab(s) by mouth 2 times a day  -- Indication: For CAD    Metoprolol Succinate ER 25 mg oral tablet, extended release  -- 1 tab(s) by mouth once a day - IN AM  -- Indication: For CAD    Vitamin D3 1000 intl units oral tablet  -- 1 tab(s) by mouth once a day (at bedtime)  -- Indication: For Supplement

## 2018-09-06 NOTE — DISCHARGE NOTE ADULT - CARE PROVIDER_API CALL
Jameel Medina), Cardiovascular Disease; Internal Medicine; Interventional Cardiology  [id number] [address] [place] [address]  [address]   [place] [address]  Phone: [phone]  Fax: [phone]

## 2018-09-06 NOTE — DISCHARGE NOTE ADULT - MEDICATION SUMMARY - MEDICATIONS TO CHANGE
I will SWITCH the dose or number of times a day I take the medications listed below when I get home from the hospital:    metFORMIN 500 mg oral tablet  -- 1 tab(s) by mouth 2 times a day

## 2018-09-06 NOTE — PROGRESS NOTE ADULT - PROBLEM SELECTOR PLAN 4
History     Chief Complaint   Patient presents with     Tailbone Pain     HPI  Marychuy Zhou is a 63 year old female with a history of RA, COPD, Epilepsy, paraplegia c/b chronic osteomyelitis 2/2 ulcers with a recent hospitalization at [facility] with a right buttock infection along with a likely osteomyelitis of the ischium presenting with buttock pain.  Pain is constant, throbbing, nonradiating, mild to moderate, nothing makes it better or worse.  She states that she has been on oral Augmentin for a wound infection since less than 1 week ago when she was hospitalized at [facility].  An infectious disease specialist told her that Dr. Pelletier at the [facility] would be the only plastic surgeon who could potentially help her in the [facility].  She is requesting further evaluation at our institution and consultation with plastic surgery.  She reports subjective fevers.  No vomiting.  No abdominal pain.  She states that she also has an ongoing pneumonia and she was informed that the Augmentin would be adequate treatment for that as well.  No other concerns or complaints.    I have reviewed the Medications, Allergies, Past Medical and Surgical History, and Social History in the Epic system.    Review of Systems   Constitutional: Positive for fever ( Subjective). Negative for chills.   HENT: Negative for congestion.    Eyes: Negative for visual disturbance.   Respiratory: Positive for cough. Negative for shortness of breath.    Cardiovascular: Negative for chest pain.   Gastrointestinal: Negative for abdominal pain, nausea and vomiting.   Endocrine: Negative for polydipsia and polyuria.   Genitourinary: Negative for difficulty urinating.   Musculoskeletal: Negative for back pain, neck pain and neck stiffness.        Buttock pain   Skin: Positive for wound. Negative for color change.   Allergic/Immunologic: Negative for immunocompromised state.   Neurological: Negative for  "light-headedness.   Hematological: Negative for adenopathy.   Psychiatric/Behavioral: Negative for agitation and behavioral problems.   All other systems reviewed and are negative.      Physical Exam   BP: 106/72  Pulse: 90  Temp: 98.1  F (36.7  C)  Resp: 16  Height: 158.8 cm (5' 2.5\")  Weight: 73.5 kg (162 lb)  SpO2: 100 %      Physical Exam   Constitutional: She is oriented to person, place, and time. No distress.   HENT:   Head: Normocephalic and atraumatic.   Mouth/Throat: Oropharynx is clear and moist. No oropharyngeal exudate.   Eyes: Conjunctivae and EOM are normal. Pupils are equal, round, and reactive to light. No scleral icterus.   Neck: Normal range of motion. Neck supple.   Cardiovascular: Normal heart sounds and intact distal pulses.    Pulmonary/Chest: Effort normal. No respiratory distress. She has rales ( Mild, diffuse, bilateral posterior lower lung fields).   Abdominal: Soft. Bowel sounds are normal. She exhibits no distension. There is no tenderness. There is no rebound and no guarding.   Bilateral PTN tubes. No CVA tenderness.   Musculoskeletal: Normal range of motion. She exhibits no edema or tenderness.   There is a large, deep wound on right buttock tracking likely all the way to the issue him.  There is erythema, induration, and blanching of the skin of the right buttock surrounding the wound.  There is some minimal purulent drainage from the wound.  There is a stage IV sacral decubitus ulcer without any signs of infection.   Neurological: She is alert and oriented to person, place, and time. No cranial nerve deficit.   T4 paraplegic   Skin: Skin is warm. No rash noted. She is not diaphoretic. There is erythema.   Psychiatric: She has a normal mood and affect. Her behavior is normal. Judgment and thought content normal.   Nursing note and vitals reviewed.      ED Course     ED Course     Procedures             Critical Care time:  none             Labs Ordered and Resulted from Time of ED " Arrival Up to the Time of Departure from the ED   CBC WITH PLATELETS DIFFERENTIAL - Abnormal; Notable for the following:        Result Value    WBC 3.7 (*)     Hemoglobin 10.4 (*)     MCH 24.9 (*)     MCHC 29.1 (*)     RDW 19.4 (*)     All other components within normal limits   BASIC METABOLIC PANEL - Abnormal; Notable for the following:     Anion Gap 2 (*)     Calcium 8.2 (*)     All other components within normal limits   CRP INFLAMMATION - Abnormal; Notable for the following:     CRP Inflammation 80.0 (*)     All other components within normal limits   ERYTHROCYTE SEDIMENTATION RATE AUTO - Abnormal; Notable for the following:     Sed Rate 64 (*)     All other components within normal limits   PERIPHERAL IV CATHETER   WOUND CULTURE AEROBIC BACTERIAL   BLOOD CULTURE   BLOOD CULTURE            Assessments & Plan (with Medical Decision Making)   63-year-old woman presenting with buttock pain.  Differential diagnosis wound infection, osteomyelitis, pneumonia, electrolyte abnormalities, cellulitis.    After thorough history and physical exam, the patient appears to be in no acute distress.  I will treat her pain with oral oxycodone and obtain laboratory studies and CXR.  I will initiate IV vancomycin and IV Zosyn as I do believe that she has a cellulitis of the right buttock along with wound infection.  Patient will be admitted to internal medicine on the [facility] for further evaluation, management, and plastic surgery consultation.  Case was discussed with Dr. Ramirez who agreed with the plan.    Laboratory studies returned with slight leukopenia of 3700.  There is chronic anemia with hemoglobin of 10.4 which is at baseline.  Electrolytes show no dehydration, creatinine is normal at 0.56.    I have reviewed the nursing notes.    I have reviewed the findings, diagnosis, plan and need for follow up with the patient.    Current Discharge Medication List          Final diagnoses:   Wound of right buttock, initial  encounter   Decubitus ulcer of sacral region, stage 4 (H)   Cellulitis of buttock, right       4/10/2018   [facility], [place], EMERGENCY DEPARTMENT     Logan Chinchilla MD  04/11/18 0048     continue actos, metformin    case dw DR Medina. Pt stable for discharge home

## 2018-09-06 NOTE — PROGRESS NOTE ADULT - SUBJECTIVE AND OBJECTIVE BOX
Subjective: No chest pain, SOB, PND, orthopnea, palpitations, diaphoresis, lightheadedness, dizziness, syncope, fever, chills, malaise, myalgias, weight changes, night sweats, abd pain, nausea, vomiting, constipation, diarrhea, BRBPR, melena, urinary symptoms, HA visual changes, paraesthesias, numbeness, weakness, paralysis, ataxia, dysarthria.     Physical Exam  Vital Signs Last 24 Hrs  T(C): 36.8 (06 Sep 2018 05:38), Max: 36.9 (05 Sep 2018 18:24)  T(F): 98.3 (06 Sep 2018 05:38), Max: 98.5 (05 Sep 2018 18:24)  HR: 82 (06 Sep 2018 05:38) (72 - 124)  BP: 117/75 (06 Sep 2018 05:38) (117/75 - 123/75)  BP(mean): --  RR: 17 (06 Sep 2018 05:38) (17 - 17)  SpO2: 97% (06 Sep 2018 05:38) (97% - 99%)  Appearance: Normal	  HEENT:   Normal oral mucosa, PERRL, EOMI	  Lymphatic: No lymphadenopathy  Cardiovascular: Normal S1 S2, No JVD, No murmurs, No edema  Respiratory: Lungs clear to auscultation	  Psychiatry: A & O x 3, Mood & affect appropriate  Gastrointestinal:  Soft, Non-tender, + BS	  Skin: No rashes, No ecchymoses, No cyanosis  Neurologic: Non-focal  Extremities: Normal range of motion, No clubbing, cyanosis or edema  Vascular: Peripheral pulses palpable 2+ bilaterally    TELEMETRY: rate regular @ 76 bpm	    ECG: SR no ST T wave changes  RADIOLOGY:   DIAGNOSTIC TESTING:    OTHER: 	  LABS    09-06    139  |  104  |  13  ----------------------------<  150<H>  3.7   |  22  |  1.05    Ca    9.7       CAPILLARY BLOOD GLUCOSE  I&O's Detail    No Known Allergies    MEDICATIONS  (STANDING):  aspirin enteric coated 81 milliGRAM(s) Oral daily  atorvastatin 80 milliGRAM(s) Oral at bedtime  dextrose 5%. 1000 milliLiter(s) (50 mL/Hr) IV Continuous <Continuous>  dextrose 50% Injectable 12.5 Gram(s) IV Push once  dextrose 50% Injectable 25 Gram(s) IV Push once  dextrose 50% Injectable 25 Gram(s) IV Push once  influenza   Vaccine 0.5 milliLiter(s) IntraMuscular once  losartan 25 milliGRAM(s) Oral daily  metoprolol succinate ER 25 milliGRAM(s) Oral daily  sertraline 100 milliGRAM(s) Oral daily  sodium chloride 0.9% lock flush 3 milliLiter(s) IV Push every 8 hours  ticagrelor 90 milliGRAM(s) Oral two times a day    MEDICATIONS  (PRN):  dextrose 40% Gel 15 Gram(s) Oral once PRN Blood Glucose LESS THAN 70 milliGRAM(s)/deciliter  glucagon  Injectable 1 milliGRAM(s) IntraMuscular once PRN Glucose LESS THAN 70 milligrams/deciliter

## 2018-09-13 ENCOUNTER — NON-APPOINTMENT (OUTPATIENT)
Age: 58
End: 2018-09-13

## 2018-09-13 ENCOUNTER — APPOINTMENT (OUTPATIENT)
Dept: CARDIOLOGY | Facility: CLINIC | Age: 58
End: 2018-09-13
Payer: COMMERCIAL

## 2018-09-13 VITALS
HEART RATE: 88 BPM | RESPIRATION RATE: 16 BRPM | OXYGEN SATURATION: 98 % | SYSTOLIC BLOOD PRESSURE: 121 MMHG | DIASTOLIC BLOOD PRESSURE: 78 MMHG

## 2018-09-13 PROCEDURE — 99214 OFFICE O/P EST MOD 30 MIN: CPT

## 2018-09-13 PROCEDURE — 93000 ELECTROCARDIOGRAM COMPLETE: CPT

## 2019-01-01 NOTE — ED ADULT NURSE REASSESSMENT NOTE - NS ED NURSE REASSESS COMMENT FT1
Pt received in bed alert and oriented and resting in bed. Pt stable and as per Md's orders blood specimen obtained and sent to the lab. Pt now awaiting cardiology consult. Nursing care ongoing and safety maintained.
2019 03:40

## 2019-04-03 NOTE — ED PROVIDER NOTE - CHPI ED SYMPTOMS POS
Patient ID: Arlen is a 52 Marni H Goldberg, MD   
NAUSEA/PAIN

## 2019-05-08 ENCOUNTER — NON-APPOINTMENT (OUTPATIENT)
Age: 59
End: 2019-05-08

## 2019-05-08 ENCOUNTER — APPOINTMENT (OUTPATIENT)
Dept: CARDIOLOGY | Facility: CLINIC | Age: 59
End: 2019-05-08
Payer: COMMERCIAL

## 2019-05-08 VITALS
BODY MASS INDEX: 31.5 KG/M2 | DIASTOLIC BLOOD PRESSURE: 75 MMHG | SYSTOLIC BLOOD PRESSURE: 117 MMHG | WEIGHT: 225 LBS | OXYGEN SATURATION: 99 % | RESPIRATION RATE: 16 BRPM | HEART RATE: 78 BPM | HEIGHT: 71 IN

## 2019-05-08 DIAGNOSIS — Z87.898 PERSONAL HISTORY OF OTHER SPECIFIED CONDITIONS: ICD-10-CM

## 2019-05-08 DIAGNOSIS — R94.39 ABNORMAL RESULT OF OTHER CARDIOVASCULAR FUNCTION STUDY: ICD-10-CM

## 2019-05-08 PROCEDURE — 99214 OFFICE O/P EST MOD 30 MIN: CPT

## 2019-05-08 PROCEDURE — 93000 ELECTROCARDIOGRAM COMPLETE: CPT

## 2019-05-13 PROBLEM — R94.39 ABNORMAL NUCLEAR STRESS TEST: Status: RESOLVED | Noted: 2018-08-31 | Resolved: 2019-05-13

## 2019-05-13 NOTE — PHYSICAL EXAM
[General Appearance - Well Developed] : well developed [Well Groomed] : well groomed [Normal Appearance] : normal appearance [No Deformities] : no deformities [General Appearance - Well Nourished] : well nourished [Eyelids - No Xanthelasma] : the eyelids demonstrated no xanthelasmas [Normal Conjunctiva] : the conjunctiva exhibited no abnormalities [General Appearance - In No Acute Distress] : no acute distress [Normal Oral Mucosa] : normal oral mucosa [No Oral Cyanosis] : no oral cyanosis [No Oral Pallor] : no oral pallor [Respiration, Rhythm And Depth] : normal respiratory rhythm and effort [Exaggerated Use Of Accessory Muscles For Inspiration] : no accessory muscle use [Auscultation Breath Sounds / Voice Sounds] : lungs were clear to auscultation bilaterally [Heart Rate And Rhythm] : heart rate and rhythm were normal [Murmurs] : no murmurs present [Heart Sounds] : normal S1 and S2 [Edema] : no peripheral edema present [Abdomen Soft] : soft [Abdomen Tenderness] : non-tender [Abnormal Walk] : normal gait [Gait - Sufficient For Exercise Testing] : the gait was sufficient for exercise testing [Cyanosis, Localized] : no localized cyanosis [Skin Color & Pigmentation] : normal skin color and pigmentation [] : no rash [Oriented To Time, Place, And Person] : oriented to person, place, and time [Affect] : the affect was normal [Mood] : the mood was normal [No Anxiety] : not feeling anxious [FreeTextEntry1] : no carotid bruits or JVD

## 2019-05-13 NOTE — REVIEW OF SYSTEMS
[Negative] : Heme/Lymph [see HPI] : see HPI [Dyspnea on exertion] : dyspnea during exertion [Chest  Pressure] : no chest pressure [Chest Pain] : no chest pain [Lower Ext Edema] : no extremity edema [Leg Claudication] : no intermittent leg claudication [Palpitations] : no palpitations

## 2019-05-13 NOTE — DISCUSSION/SUMMARY
[Coronary Artery Disease] : coronary artery disease [Hypertension] : hypertension [Stable] : stable [FreeTextEntry1] : \par Currently stable from a cardiovascular standpoint. Normotensive. Euvolemic. Stable CAD (prior CABG and stents) with preserved LV systolic function. Exertional dyspnea may be secondary to physical conditioning. Will monitor symptoms at this time. Continue current medications. ECG completed today and reviewed (findings as noted above). Follow up in 4 months.

## 2019-05-13 NOTE — HISTORY OF PRESENT ILLNESS
[FreeTextEntry1] : Doing okay. Denies chest pain or palpitations. Occasional shortness of breath on moderate exertion.

## 2019-07-22 ENCOUNTER — OUTPATIENT (OUTPATIENT)
Dept: OUTPATIENT SERVICES | Facility: HOSPITAL | Age: 59
LOS: 1 days | End: 2019-07-22
Payer: COMMERCIAL

## 2019-07-22 VITALS
SYSTOLIC BLOOD PRESSURE: 126 MMHG | TEMPERATURE: 98 F | RESPIRATION RATE: 16 BRPM | DIASTOLIC BLOOD PRESSURE: 84 MMHG | HEART RATE: 84 BPM | HEIGHT: 71 IN | OXYGEN SATURATION: 98 % | WEIGHT: 315 LBS

## 2019-07-22 DIAGNOSIS — M25.512 PAIN IN LEFT SHOULDER: ICD-10-CM

## 2019-07-22 DIAGNOSIS — Z01.818 ENCOUNTER FOR OTHER PREPROCEDURAL EXAMINATION: ICD-10-CM

## 2019-07-22 DIAGNOSIS — Q15.9 CONGENITAL MALFORMATION OF EYE, UNSPECIFIED: Chronic | ICD-10-CM

## 2019-07-22 DIAGNOSIS — K46.9 UNSPECIFIED ABDOMINAL HERNIA WITHOUT OBSTRUCTION OR GANGRENE: Chronic | ICD-10-CM

## 2019-07-22 DIAGNOSIS — Z95.1 PRESENCE OF AORTOCORONARY BYPASS GRAFT: Chronic | ICD-10-CM

## 2019-07-22 DIAGNOSIS — Z95.5 PRESENCE OF CORONARY ANGIOPLASTY IMPLANT AND GRAFT: Chronic | ICD-10-CM

## 2019-07-22 DIAGNOSIS — I25.10 ATHEROSCLEROTIC HEART DISEASE OF NATIVE CORONARY ARTERY WITHOUT ANGINA PECTORIS: Chronic | ICD-10-CM

## 2019-07-22 DIAGNOSIS — S46.012A STRAIN OF MUSCLE(S) AND TENDON(S) OF THE ROTATOR CUFF OF LEFT SHOULDER, INITIAL ENCOUNTER: ICD-10-CM

## 2019-07-22 LAB
ANION GAP SERPL CALC-SCNC: 5 MMOL/L — SIGNIFICANT CHANGE UP (ref 5–17)
BUN SERPL-MCNC: 17 MG/DL — SIGNIFICANT CHANGE UP (ref 7–23)
CALCIUM SERPL-MCNC: 9.4 MG/DL — SIGNIFICANT CHANGE UP (ref 8.5–10.1)
CHLORIDE SERPL-SCNC: 108 MMOL/L — SIGNIFICANT CHANGE UP (ref 96–108)
CO2 SERPL-SCNC: 28 MMOL/L — SIGNIFICANT CHANGE UP (ref 22–31)
CREAT SERPL-MCNC: 1 MG/DL — SIGNIFICANT CHANGE UP (ref 0.5–1.3)
GLUCOSE SERPL-MCNC: 91 MG/DL — SIGNIFICANT CHANGE UP (ref 70–99)
HBA1C BLD-MCNC: 5.7 % — HIGH (ref 4–5.6)
HCT VFR BLD CALC: 39.8 % — SIGNIFICANT CHANGE UP (ref 39–50)
HGB BLD-MCNC: 13.5 G/DL — SIGNIFICANT CHANGE UP (ref 13–17)
MCHC RBC-ENTMCNC: 30.3 PG — SIGNIFICANT CHANGE UP (ref 27–34)
MCHC RBC-ENTMCNC: 33.9 GM/DL — SIGNIFICANT CHANGE UP (ref 32–36)
MCV RBC AUTO: 89.4 FL — SIGNIFICANT CHANGE UP (ref 80–100)
PLATELET # BLD AUTO: 174 K/UL — SIGNIFICANT CHANGE UP (ref 150–400)
POTASSIUM SERPL-MCNC: 4.4 MMOL/L — SIGNIFICANT CHANGE UP (ref 3.5–5.3)
POTASSIUM SERPL-SCNC: 4.4 MMOL/L — SIGNIFICANT CHANGE UP (ref 3.5–5.3)
RBC # BLD: 4.45 M/UL — SIGNIFICANT CHANGE UP (ref 4.2–5.8)
RBC # FLD: 13.3 % — SIGNIFICANT CHANGE UP (ref 10.3–14.5)
SODIUM SERPL-SCNC: 141 MMOL/L — SIGNIFICANT CHANGE UP (ref 135–145)
WBC # BLD: 9.59 K/UL — SIGNIFICANT CHANGE UP (ref 3.8–10.5)
WBC # FLD AUTO: 9.59 K/UL — SIGNIFICANT CHANGE UP (ref 3.8–10.5)

## 2019-07-22 PROCEDURE — 36415 COLL VENOUS BLD VENIPUNCTURE: CPT

## 2019-07-22 PROCEDURE — 85027 COMPLETE CBC AUTOMATED: CPT

## 2019-07-22 PROCEDURE — 80048 BASIC METABOLIC PNL TOTAL CA: CPT

## 2019-07-22 PROCEDURE — 93010 ELECTROCARDIOGRAM REPORT: CPT

## 2019-07-22 PROCEDURE — G0463: CPT

## 2019-07-22 PROCEDURE — 93005 ELECTROCARDIOGRAM TRACING: CPT

## 2019-07-22 PROCEDURE — 83036 HEMOGLOBIN GLYCOSYLATED A1C: CPT

## 2019-07-22 NOTE — H&P PST ADULT - HISTORY OF PRESENT ILLNESS
58 y/o male, PMH of HTN, T2DM, with c/o of pain in the left shoulder happened while working fell in Nov 2017, and hurt left shoulder left knee and left ankle  Had knee arthroscopy last yr. Had to postpone tis surgery due to cardiac stent insertion about a yr ago. Was seen by Dr Hardy, about 3 weeks ago, MRI, shows laceration of left rotator cuff, Scheduled for left shoulder arthroscopy. Pre op testing today. 60 y/o male, PMH of HTN, T2DM, with c/o of pain in the left shoulder happened while working, fell in Nov 2017, and hurt left shoulder left knee and left ankle  Had knee arthroscopy last yr. Had to postpone this surgery due to cardiac event. Had 2 stent placed in Jan2017. One of them needed baloon angioplasty, in Sep 2018.    Now ready for surgery, seen by Dr Hardy, about 3 weeks ago, MRI, shows laceration of left rotator cuff, Scheduled for left shoulder arthroscopy. Pre op testing today.

## 2019-07-22 NOTE — H&P PST ADULT - NSICDXPASTMEDICALHX_GEN_ALL_CORE_FT
PAST MEDICAL HISTORY:  Arthritis     Back pain     CAD (coronary artery disease) CABG x3    Complex tear of medial meniscus, current injury, left knee, initial encounter     HLD (hyperlipidemia)     HTN (hypertension)     Prediabetes     S/P CABG     Stented coronary artery promus premier stent pSVG to OM 2014 and promus premier stent x2 SVG to OM 2017

## 2019-07-22 NOTE — H&P PST ADULT - NSICDXFAMILYHX_GEN_ALL_CORE_FT
FAMILY HISTORY:  Family history of coronary artery disease in father, Father -  age 86  Family history of prostate cancer in father, Father -  age 86  FHx: stroke, Mother. 86Y  Hypertension, Mother - Alive age 86

## 2019-07-22 NOTE — H&P PST ADULT - ASSESSMENT
60 y/o male, PMH of HTN, T2DM, with c/o of pain in the left shoulder happened while working fell in Nov 2017, and hurt left shoulder left knee and left ankle  Had knee arthroscopy last yr. Had to postpone tis surgery due to cardiac stent insertion about a yr ago. Was seen by Dr Hardy, about 3 weeks ago, MRI, shows laceration of left rotator cuff, Scheduled for left shoulder arthroscopy. Pre op testing today.    Medical eval and cardiac eval advised. 58 y/o male, PMH of HTN, T2DM, with c/o of pain in the left shoulder happened while working, fell in Nov 2017, and hurt left shoulder left knee and left ankle  Had knee arthroscopy last yr. Had to postpone this surgery due to cardiac event. Had 2 stent placed in Jan2017. One of them needed baloon angioplasty, in Sep 2018.    Now ready for surgery, seen by Dr Hardy, about 3 weeks ago, MRI, shows laceration of left rotator cuff, Scheduled for left shoulder arthroscopy. Pre op testing today.      Medical eval and cardiac eval advised.

## 2019-07-22 NOTE — H&P PST ADULT - NSICDXPASTSURGICALHX_GEN_ALL_CORE_FT
PAST SURGICAL HISTORY:  CAD (Coronary Artery Disease) of Artery Bypass Graft 2005 - March 17th    CAD (Coronary Artery Disease), Nonautologous Biological Bypass Graft     Eye abnormality left eye amblyopia repaired in 1994    Hernia repaired    Hernia, Inguinal     Hypercholesterolemia     Pneumonia due to Organism     S/P CABG x 3 2005  3vCABG 2005 (LIMA to LAD, SVG to OM, left radial to Diag)   NYQ    Status post coronary artery stent placement Cardiac Stent Placed April 30th 2014 @ [facility]  Cardiac Stent X 2 placed January 15th 2017 (piggybacked) @ [facility] PAST SURGICAL HISTORY:  CAD (Coronary Artery Disease) of Artery Bypass Graft 2005 - March 17th    CAD (Coronary Artery Disease), Nonautologous Biological Bypass Graft     CAD S/P percutaneous coronary angioplasty baloon angioplasty Sep 2018    Eye abnormality left eye amblyopia repaired in 1994    Hernia repaired    Hernia, Inguinal     Hypercholesterolemia     Pneumonia due to Organism     S/P CABG x 3 2005  3vCABG 2005 (LIMA to LAD, SVG to OM, left radial to Diag)   NYQ    Status post coronary artery stent placement Cardiac Stent Placed April 30th 2014 @ [facility]  Cardiac Stent X 2 placed January 15th 2017 (piggybacked) @ [facility]

## 2019-07-24 ENCOUNTER — APPOINTMENT (OUTPATIENT)
Dept: CARDIOLOGY | Facility: CLINIC | Age: 59
End: 2019-07-24
Payer: COMMERCIAL

## 2019-07-24 ENCOUNTER — NON-APPOINTMENT (OUTPATIENT)
Age: 59
End: 2019-07-24

## 2019-07-24 VITALS
HEART RATE: 87 BPM | OXYGEN SATURATION: 97 % | BODY MASS INDEX: 33.88 KG/M2 | DIASTOLIC BLOOD PRESSURE: 74 MMHG | WEIGHT: 242 LBS | HEIGHT: 71 IN | SYSTOLIC BLOOD PRESSURE: 110 MMHG

## 2019-07-24 PROCEDURE — 93000 ELECTROCARDIOGRAM COMPLETE: CPT

## 2019-07-24 PROCEDURE — 99214 OFFICE O/P EST MOD 30 MIN: CPT

## 2019-07-25 NOTE — PHYSICAL EXAM
[Normal Appearance] : normal appearance [General Appearance - Well Developed] : well developed [No Deformities] : no deformities [General Appearance - Well Nourished] : well nourished [Well Groomed] : well groomed [General Appearance - In No Acute Distress] : no acute distress [Normal Conjunctiva] : the conjunctiva exhibited no abnormalities [Normal Oral Mucosa] : normal oral mucosa [Eyelids - No Xanthelasma] : the eyelids demonstrated no xanthelasmas [No Oral Pallor] : no oral pallor [No Oral Cyanosis] : no oral cyanosis [Respiration, Rhythm And Depth] : normal respiratory rhythm and effort [Heart Rate And Rhythm] : heart rate and rhythm were normal [Auscultation Breath Sounds / Voice Sounds] : lungs were clear to auscultation bilaterally [Exaggerated Use Of Accessory Muscles For Inspiration] : no accessory muscle use [Murmurs] : no murmurs present [Heart Sounds] : normal S1 and S2 [Edema] : no peripheral edema present [Abdomen Soft] : soft [Abdomen Tenderness] : non-tender [Cyanosis, Localized] : no localized cyanosis [Gait - Sufficient For Exercise Testing] : the gait was sufficient for exercise testing [Abnormal Walk] : normal gait [Skin Color & Pigmentation] : normal skin color and pigmentation [] : no rash [Oriented To Time, Place, And Person] : oriented to person, place, and time [No Anxiety] : not feeling anxious [Mood] : the mood was normal [Affect] : the affect was normal [FreeTextEntry1] : no carotid bruits or JVD

## 2019-07-25 NOTE — DISCUSSION/SUMMARY
[Coronary Artery Disease] : coronary artery disease [Hypertension] : hypertension [Stable] : stable [FreeTextEntry1] : \par Currently stable from a cardiovascular standpoint. Normotensive. Euvolemic. Stable CAD. No ischemic or CHF symptoms. Continue current medications. ECG completed today and reviewed. At this time, patient is considered an acceptable risk from a cardiac standpoint for the anticipated shoulder surgery. Patient may hold Brilinta for 2 days prior to surgery and reinitiate post-op. Follow up in 4 months.

## 2019-07-25 NOTE — HISTORY OF PRESENT ILLNESS
[FreeTextEntry1] : Doing well. Denies chest pain, shortness of breath or palpitations. Anticipating left shoulder surgery at [facility] on August 2, 2019. Patient planning to retire in next few months.

## 2019-08-01 ENCOUNTER — TRANSCRIPTION ENCOUNTER (OUTPATIENT)
Age: 59
End: 2019-08-01

## 2019-08-02 ENCOUNTER — OUTPATIENT (OUTPATIENT)
Dept: OUTPATIENT SERVICES | Facility: HOSPITAL | Age: 59
LOS: 1 days | End: 2019-08-02
Payer: COMMERCIAL

## 2019-08-02 ENCOUNTER — RESULT REVIEW (OUTPATIENT)
Age: 59
End: 2019-08-02

## 2019-08-02 VITALS
HEART RATE: 95 BPM | SYSTOLIC BLOOD PRESSURE: 118 MMHG | RESPIRATION RATE: 18 BRPM | OXYGEN SATURATION: 95 % | DIASTOLIC BLOOD PRESSURE: 75 MMHG | TEMPERATURE: 98 F

## 2019-08-02 VITALS
SYSTOLIC BLOOD PRESSURE: 130 MMHG | HEIGHT: 71 IN | OXYGEN SATURATION: 97 % | DIASTOLIC BLOOD PRESSURE: 85 MMHG | HEART RATE: 77 BPM | TEMPERATURE: 98 F | WEIGHT: 234.79 LBS | RESPIRATION RATE: 18 BRPM

## 2019-08-02 DIAGNOSIS — I25.10 ATHEROSCLEROTIC HEART DISEASE OF NATIVE CORONARY ARTERY WITHOUT ANGINA PECTORIS: Chronic | ICD-10-CM

## 2019-08-02 DIAGNOSIS — K46.9 UNSPECIFIED ABDOMINAL HERNIA WITHOUT OBSTRUCTION OR GANGRENE: Chronic | ICD-10-CM

## 2019-08-02 DIAGNOSIS — Z95.1 PRESENCE OF AORTOCORONARY BYPASS GRAFT: Chronic | ICD-10-CM

## 2019-08-02 DIAGNOSIS — Q15.9 CONGENITAL MALFORMATION OF EYE, UNSPECIFIED: Chronic | ICD-10-CM

## 2019-08-02 DIAGNOSIS — S46.012A STRAIN OF MUSCLE(S) AND TENDON(S) OF THE ROTATOR CUFF OF LEFT SHOULDER, INITIAL ENCOUNTER: ICD-10-CM

## 2019-08-02 DIAGNOSIS — Z01.818 ENCOUNTER FOR OTHER PREPROCEDURAL EXAMINATION: ICD-10-CM

## 2019-08-02 DIAGNOSIS — Z95.5 PRESENCE OF CORONARY ANGIOPLASTY IMPLANT AND GRAFT: Chronic | ICD-10-CM

## 2019-08-02 PROCEDURE — 29826 SHO ARTHRS SRG DECOMPRESSION: CPT | Mod: LT

## 2019-08-02 PROCEDURE — 23120 CLAVICULECTOMY PARTIAL: CPT | Mod: LT

## 2019-08-02 PROCEDURE — 29820 SHO ARTHRS SRG PRTL SYNVCT: CPT | Mod: LT

## 2019-08-02 PROCEDURE — 88304 TISSUE EXAM BY PATHOLOGIST: CPT | Mod: 26

## 2019-08-02 PROCEDURE — 29822 SHO ARTHRS SRG LMTD DBRDMT: CPT | Mod: LT

## 2019-08-02 PROCEDURE — 88304 TISSUE EXAM BY PATHOLOGIST: CPT

## 2019-08-02 PROCEDURE — 82962 GLUCOSE BLOOD TEST: CPT

## 2019-08-02 PROCEDURE — 88311 DECALCIFY TISSUE: CPT | Mod: 26

## 2019-08-02 PROCEDURE — 88311 DECALCIFY TISSUE: CPT

## 2019-08-02 PROCEDURE — C1889: CPT

## 2019-08-02 RX ORDER — ONDANSETRON 8 MG/1
4 TABLET, FILM COATED ORAL ONCE
Refills: 0 | Status: COMPLETED | OUTPATIENT
Start: 2019-08-02 | End: 2019-08-02

## 2019-08-02 RX ORDER — HYDROMORPHONE HYDROCHLORIDE 2 MG/ML
0.5 INJECTION INTRAMUSCULAR; INTRAVENOUS; SUBCUTANEOUS
Refills: 0 | Status: DISCONTINUED | OUTPATIENT
Start: 2019-08-02 | End: 2019-08-02

## 2019-08-02 RX ORDER — SODIUM CHLORIDE 9 MG/ML
1000 INJECTION, SOLUTION INTRAVENOUS
Refills: 0 | Status: DISCONTINUED | OUTPATIENT
Start: 2019-08-02 | End: 2019-08-02

## 2019-08-02 RX ORDER — OXYCODONE HYDROCHLORIDE 5 MG/1
5 TABLET ORAL ONCE
Refills: 0 | Status: DISCONTINUED | OUTPATIENT
Start: 2019-08-02 | End: 2019-08-02

## 2019-08-02 RX ORDER — CEFAZOLIN SODIUM 1 G
2000 VIAL (EA) INJECTION ONCE
Refills: 0 | Status: COMPLETED | OUTPATIENT
Start: 2019-08-02 | End: 2019-08-02

## 2019-08-02 RX ORDER — MORPHINE SULFATE 50 MG/1
4 CAPSULE, EXTENDED RELEASE ORAL ONCE
Refills: 0 | Status: DISCONTINUED | OUTPATIENT
Start: 2019-08-02 | End: 2019-08-02

## 2019-08-02 RX ADMIN — SODIUM CHLORIDE 60 MILLILITER(S): 9 INJECTION, SOLUTION INTRAVENOUS at 06:47

## 2019-08-02 RX ADMIN — ONDANSETRON 4 MILLIGRAM(S): 8 TABLET, FILM COATED ORAL at 10:33

## 2019-08-02 NOTE — ASU DISCHARGE PLAN (ADULT/PEDIATRIC) - ASU DC SPECIAL INSTRUCTIONSFT
Keep left shoulder dressings clean and intact.       You may shower with the clear dressings in place - do not remove dressings.  Do not let the stream of water hit the wounds directly, do not rub incisions.     Ice the shoulder 20 min on/20 min off for 2-3 hours twice a day.      Remain in sling for comfort, but beginning tomorrow, 8/3 - you are encouraged to remove the sling periodically to move the shoulder to prevent stiffness/adhesions.      Take Percocet for severe pain as prescribed.  You may take over-the-counter 200mg Motrin for mild or moderate pain (take 2 pills for mild pain every 6 hours with food, take 3 pills for moderate pain every 6 hours with food).      Follow-up with Dr. Rosenberg in his office next week - call office for appointment if not already made (see below).

## 2019-08-02 NOTE — BRIEF OPERATIVE NOTE - NSICDXBRIEFPOSTOP_GEN_ALL_CORE_FT
POST-OP DIAGNOSIS:  Labral tear of shoulder, degenerative, left 02-Aug-2019 09:48:05 Fraying/degeneration of glenoid labrum of left shoulder Anil Denise  AC (acromioclavicular) arthritis 02-Aug-2019 09:47:54  Anil Denise  Shoulder impingement syndrome, left 02-Aug-2019 09:47:16  Anil Denise

## 2019-08-02 NOTE — ASU PATIENT PROFILE, ADULT - PSH
CAD (Coronary Artery Disease) of Artery Bypass Graft  2005 - March 17th  CAD (Coronary Artery Disease), Nonautologous Biological Bypass Graft    CAD S/P percutaneous coronary angioplasty  baloon angioplasty Sep 2018  Eye abnormality  left eye amblyopia repaired in 1994  Hernia  repaired  Hernia, Inguinal    Hypercholesterolemia    Pneumonia due to Organism    S/P CABG x 3  2005  3vCABG 2005 (LIMA to LAD, SVG to OM, left radial to Diag)   NYQ  Status post coronary artery stent placement  Cardiac Stent Placed April 30th 2014 @ [facility]  Cardiac Stent X 2 placed January 15th 2017 (piggybacked) @ [facility]

## 2019-08-02 NOTE — BRIEF OPERATIVE NOTE - OPERATION/FINDINGS
Frayed/degenerative glenoid labral cartilage.  Beefy/hypertrophic subacromial/subdeltoid bursa.  AC joint narrowing with superior and inferior osteophyte formation.  Subacromial osteophyte/downsloping acromion.

## 2019-08-02 NOTE — ASU DISCHARGE PLAN (ADULT/PEDIATRIC) - CARE PROVIDER_API CALL
Luciano Rosenberg)  Orthopaedic Surgery; Surgery of the Hand  [address] [place] [address]  Phone: [phone]  Fax: [phone]  Follow Up Time:

## 2019-08-05 LAB — SURGICAL PATHOLOGY STUDY: SIGNIFICANT CHANGE UP

## 2020-02-12 ENCOUNTER — APPOINTMENT (OUTPATIENT)
Dept: CARDIOLOGY | Facility: CLINIC | Age: 60
End: 2020-02-12
Payer: COMMERCIAL

## 2020-02-12 ENCOUNTER — NON-APPOINTMENT (OUTPATIENT)
Age: 60
End: 2020-02-12

## 2020-02-12 VITALS
WEIGHT: 240 LBS | OXYGEN SATURATION: 97 % | RESPIRATION RATE: 16 BRPM | HEART RATE: 78 BPM | HEIGHT: 71 IN | BODY MASS INDEX: 33.6 KG/M2 | DIASTOLIC BLOOD PRESSURE: 79 MMHG | SYSTOLIC BLOOD PRESSURE: 117 MMHG

## 2020-02-12 PROCEDURE — 99213 OFFICE O/P EST LOW 20 MIN: CPT

## 2020-02-12 PROCEDURE — 93000 ELECTROCARDIOGRAM COMPLETE: CPT

## 2020-02-13 NOTE — DISCUSSION/SUMMARY
[Coronary Artery Disease] : coronary artery disease [Stable] : stable [Hypertension] : hypertension [FreeTextEntry1] : \par Currently stable from a cardiovascular standpoint. Normotensive. Euvolemic. Stable CAD (prior CABG and SVG-OM stents) with preserved LV systolic function. No ischemic or CHF symptoms. Continue current medications. ECG completed today and reviewed (findings as noted above). Follow up in 6 months.

## 2020-02-13 NOTE — PHYSICAL EXAM
[General Appearance - Well Developed] : well developed [Normal Appearance] : normal appearance [General Appearance - In No Acute Distress] : no acute distress [General Appearance - Well Nourished] : well nourished [Well Groomed] : well groomed [No Deformities] : no deformities [Normal Oral Mucosa] : normal oral mucosa [Eyelids - No Xanthelasma] : the eyelids demonstrated no xanthelasmas [Normal Conjunctiva] : the conjunctiva exhibited no abnormalities [No Oral Pallor] : no oral pallor [No Oral Cyanosis] : no oral cyanosis [Respiration, Rhythm And Depth] : normal respiratory rhythm and effort [Exaggerated Use Of Accessory Muscles For Inspiration] : no accessory muscle use [Heart Rate And Rhythm] : heart rate and rhythm were normal [Auscultation Breath Sounds / Voice Sounds] : lungs were clear to auscultation bilaterally [Murmurs] : no murmurs present [Heart Sounds] : normal S1 and S2 [Edema] : no peripheral edema present [Abdomen Soft] : soft [Abdomen Tenderness] : non-tender [Abnormal Walk] : normal gait [Gait - Sufficient For Exercise Testing] : the gait was sufficient for exercise testing [Cyanosis, Localized] : no localized cyanosis [Affect] : the affect was normal [] : no rash [Skin Color & Pigmentation] : normal skin color and pigmentation [Oriented To Time, Place, And Person] : oriented to person, place, and time [No Anxiety] : not feeling anxious [Mood] : the mood was normal [FreeTextEntry1] : no carotid bruits or JVD

## 2020-06-29 NOTE — H&P PST ADULT - NSANTHOSAYNRD_GEN_A_CORE
GI RNs and schedulers–    1. Please recall for chart check, phone call on/after 5/1/2021.   We could consider repeat colonoscopy examination in June 2021 depending on his condition for history of high risk colon polyps on exam 6/26/2020.    2.  Video capsu No. SARA screening performed.  STOP BANG Legend: 0-2 = LOW Risk; 3-4 = INTERMEDIATE Risk; 5-8 = HIGH Risk

## 2020-09-09 ENCOUNTER — RESULT REVIEW (OUTPATIENT)
Age: 60
End: 2020-09-09

## 2020-11-11 ENCOUNTER — APPOINTMENT (OUTPATIENT)
Dept: CARDIOLOGY | Facility: CLINIC | Age: 60
End: 2020-11-11
Payer: COMMERCIAL

## 2020-11-11 ENCOUNTER — NON-APPOINTMENT (OUTPATIENT)
Age: 60
End: 2020-11-11

## 2020-11-11 VITALS
HEART RATE: 87 BPM | SYSTOLIC BLOOD PRESSURE: 115 MMHG | BODY MASS INDEX: 31.92 KG/M2 | WEIGHT: 228 LBS | DIASTOLIC BLOOD PRESSURE: 74 MMHG | OXYGEN SATURATION: 97 % | HEIGHT: 71 IN | RESPIRATION RATE: 16 BRPM

## 2020-11-11 PROCEDURE — 93000 ELECTROCARDIOGRAM COMPLETE: CPT

## 2020-11-11 PROCEDURE — 99214 OFFICE O/P EST MOD 30 MIN: CPT

## 2020-11-11 PROCEDURE — 99072 ADDL SUPL MATRL&STAF TM PHE: CPT

## 2020-11-11 RX ORDER — EMPAGLIFLOZIN 10 MG/1
10 TABLET, FILM COATED ORAL
Qty: 90 | Refills: 0 | Status: ACTIVE | COMMUNITY
Start: 2020-08-11

## 2020-11-12 NOTE — REVIEW OF SYSTEMS
[see HPI] : see HPI [Dyspnea on exertion] : dyspnea during exertion [Negative] : Heme/Lymph [Shortness Of Breath] : no shortness of breath [Chest  Pressure] : no chest pressure [Chest Pain] : no chest pain [Lower Ext Edema] : no extremity edema [Leg Claudication] : no intermittent leg claudication [Palpitations] : no palpitations

## 2020-11-12 NOTE — HISTORY OF PRESENT ILLNESS
[FreeTextEntry1] : Doing okay. Denies chest pain or palpitations. Occasional shortness of breath on moderate exertion. Has lost about 15 lbs by watching his diet and not drinking beer.

## 2020-11-12 NOTE — PHYSICAL EXAM
[General Appearance - Well Developed] : well developed [Normal Appearance] : normal appearance [Well Groomed] : well groomed [General Appearance - Well Nourished] : well nourished [No Deformities] : no deformities [General Appearance - In No Acute Distress] : no acute distress [Normal Conjunctiva] : the conjunctiva exhibited no abnormalities [Eyelids - No Xanthelasma] : the eyelids demonstrated no xanthelasmas [Normal Oral Mucosa] : normal oral mucosa [No Oral Pallor] : no oral pallor [No Oral Cyanosis] : no oral cyanosis [Respiration, Rhythm And Depth] : normal respiratory rhythm and effort [Exaggerated Use Of Accessory Muscles For Inspiration] : no accessory muscle use [Auscultation Breath Sounds / Voice Sounds] : lungs were clear to auscultation bilaterally [Heart Rate And Rhythm] : heart rate and rhythm were normal [Heart Sounds] : normal S1 and S2 [Murmurs] : no murmurs present [Edema] : no peripheral edema present [Abdomen Soft] : soft [Abdomen Tenderness] : non-tender [Abnormal Walk] : normal gait [Gait - Sufficient For Exercise Testing] : the gait was sufficient for exercise testing [Cyanosis, Localized] : no localized cyanosis [Skin Color & Pigmentation] : normal skin color and pigmentation [] : no rash [Oriented To Time, Place, And Person] : oriented to person, place, and time [Affect] : the affect was normal [Mood] : the mood was normal [No Anxiety] : not feeling anxious [FreeTextEntry1] : no carotid bruits or JVD

## 2020-11-12 NOTE — DISCUSSION/SUMMARY
[Coronary Artery Disease] : coronary artery disease [Hypertension] : hypertension [Stable] : stable [FreeTextEntry1] : \par Currently stable from a cardiovascular standpoint. Normotensive. Euvolemic. Stable CAD (s/p CABG and stents) with preserved LV systolic function. No ischemic or CHF symptoms at this time. Patient initiated on SGLT2 inhibitor given diabetes and CAD history. Continue current medications. ECG completed today and reviewed (findings as noted above). Follow up in 4 months.

## 2021-02-21 ENCOUNTER — EMERGENCY (EMERGENCY)
Facility: HOSPITAL | Age: 61
LOS: 1 days | Discharge: HOME | End: 2021-02-21
Attending: EMERGENCY MEDICINE | Admitting: EMERGENCY MEDICINE
Payer: COMMERCIAL

## 2021-02-21 VITALS
SYSTOLIC BLOOD PRESSURE: 127 MMHG | OXYGEN SATURATION: 99 % | DIASTOLIC BLOOD PRESSURE: 76 MMHG | RESPIRATION RATE: 18 BRPM | HEIGHT: 71 IN | TEMPERATURE: 98 F | HEART RATE: 87 BPM | WEIGHT: 220.02 LBS

## 2021-02-21 DIAGNOSIS — Z79.82 LONG TERM (CURRENT) USE OF ASPIRIN: ICD-10-CM

## 2021-02-21 DIAGNOSIS — R10.32 LEFT LOWER QUADRANT PAIN: ICD-10-CM

## 2021-02-21 DIAGNOSIS — Z87.01 PERSONAL HISTORY OF PNEUMONIA (RECURRENT): ICD-10-CM

## 2021-02-21 DIAGNOSIS — K57.92 DIVERTICULITIS OF INTESTINE, PART UNSPECIFIED, WITHOUT PERFORATION OR ABSCESS WITHOUT BLEEDING: ICD-10-CM

## 2021-02-21 DIAGNOSIS — Z20.822 CONTACT WITH AND (SUSPECTED) EXPOSURE TO COVID-19: ICD-10-CM

## 2021-02-21 DIAGNOSIS — I10 ESSENTIAL (PRIMARY) HYPERTENSION: ICD-10-CM

## 2021-02-21 DIAGNOSIS — M19.90 UNSPECIFIED OSTEOARTHRITIS, UNSPECIFIED SITE: ICD-10-CM

## 2021-02-21 DIAGNOSIS — K46.9 UNSPECIFIED ABDOMINAL HERNIA WITHOUT OBSTRUCTION OR GANGRENE: Chronic | ICD-10-CM

## 2021-02-21 DIAGNOSIS — I25.10 ATHEROSCLEROTIC HEART DISEASE OF NATIVE CORONARY ARTERY WITHOUT ANGINA PECTORIS: Chronic | ICD-10-CM

## 2021-02-21 DIAGNOSIS — Z79.84 LONG TERM (CURRENT) USE OF ORAL HYPOGLYCEMIC DRUGS: ICD-10-CM

## 2021-02-21 DIAGNOSIS — E78.5 HYPERLIPIDEMIA, UNSPECIFIED: ICD-10-CM

## 2021-02-21 DIAGNOSIS — Z95.1 PRESENCE OF AORTOCORONARY BYPASS GRAFT: Chronic | ICD-10-CM

## 2021-02-21 DIAGNOSIS — Z95.1 PRESENCE OF AORTOCORONARY BYPASS GRAFT: ICD-10-CM

## 2021-02-21 DIAGNOSIS — R73.03 PREDIABETES: ICD-10-CM

## 2021-02-21 DIAGNOSIS — Q15.9 CONGENITAL MALFORMATION OF EYE, UNSPECIFIED: Chronic | ICD-10-CM

## 2021-02-21 DIAGNOSIS — Z98.890 OTHER SPECIFIED POSTPROCEDURAL STATES: ICD-10-CM

## 2021-02-21 DIAGNOSIS — Z95.5 PRESENCE OF CORONARY ANGIOPLASTY IMPLANT AND GRAFT: Chronic | ICD-10-CM

## 2021-02-21 DIAGNOSIS — Z95.5 PRESENCE OF CORONARY ANGIOPLASTY IMPLANT AND GRAFT: ICD-10-CM

## 2021-02-21 DIAGNOSIS — I25.10 ATHEROSCLEROTIC HEART DISEASE OF NATIVE CORONARY ARTERY WITHOUT ANGINA PECTORIS: ICD-10-CM

## 2021-02-21 PROCEDURE — 93010 ELECTROCARDIOGRAM REPORT: CPT

## 2021-02-21 PROCEDURE — 99285 EMERGENCY DEPT VISIT HI MDM: CPT

## 2021-02-21 NOTE — ED PROVIDER NOTE - CARE PLAN
Principal Discharge DX:	Near syncope   Principal Discharge DX:	Near syncope  Secondary Diagnosis:	Nausea alone

## 2021-02-21 NOTE — ED PROVIDER NOTE - OBJECTIVE STATEMENT
61yo male bib ems s/p near syncopal episode. pt states tonight he felt dizzy and lightheaded, felt like he was going to pass out, no vomiting or diarrhea, no chest pain, pt has been on abx for the last 3 days for divertic

## 2021-02-21 NOTE — ED ADULT TRIAGE NOTE - CHIEF COMPLAINT QUOTE
BIBEMS from home C/O nausea, states he has been having a diverticulitis flare up. BIBEMS from home C/O nausea, states he has been having a diverticulitis flare up. Followed up with his PMD and was prescribed PO antibiotics.

## 2021-02-21 NOTE — ED PROVIDER NOTE - PROGRESS NOTE DETAILS
pt reevaluted, feeling better, nausea has improved, pt able to tolerate po, pt to be d/c home follow up with pmd, d;/c with reglan return if any symtpoms worsen

## 2021-02-21 NOTE — ED PROVIDER NOTE - PATIENT PORTAL LINK FT
You can access the FollowMyHealth Patient Portal offered by [facility] by registering at the following website: [email or web address]. By joining [facility]’s FollowMyHealth portal, you will also be able to view your health information using other applications (apps) compatible with our system.

## 2021-02-22 VITALS
TEMPERATURE: 98 F | RESPIRATION RATE: 16 BRPM | SYSTOLIC BLOOD PRESSURE: 119 MMHG | DIASTOLIC BLOOD PRESSURE: 62 MMHG | HEART RATE: 78 BPM | OXYGEN SATURATION: 100 %

## 2021-02-22 LAB
ALBUMIN SERPL ELPH-MCNC: 4 G/DL — SIGNIFICANT CHANGE UP (ref 3.3–5)
ALP SERPL-CCNC: 109 U/L — SIGNIFICANT CHANGE UP (ref 40–120)
ALT FLD-CCNC: 37 U/L — SIGNIFICANT CHANGE UP (ref 12–78)
ANION GAP SERPL CALC-SCNC: 7 MMOL/L — SIGNIFICANT CHANGE UP (ref 5–17)
AST SERPL-CCNC: 26 U/L — SIGNIFICANT CHANGE UP (ref 15–37)
BASOPHILS # BLD AUTO: 0.04 K/UL — SIGNIFICANT CHANGE UP (ref 0–0.2)
BASOPHILS NFR BLD AUTO: 0.3 % — SIGNIFICANT CHANGE UP (ref 0–2)
BILIRUB SERPL-MCNC: 0.5 MG/DL — SIGNIFICANT CHANGE UP (ref 0.2–1.2)
BUN SERPL-MCNC: 16 MG/DL — SIGNIFICANT CHANGE UP (ref 7–23)
CALCIUM SERPL-MCNC: 8.9 MG/DL — SIGNIFICANT CHANGE UP (ref 8.5–10.1)
CHLORIDE SERPL-SCNC: 112 MMOL/L — HIGH (ref 96–108)
CO2 SERPL-SCNC: 24 MMOL/L — SIGNIFICANT CHANGE UP (ref 22–31)
CREAT SERPL-MCNC: 1.3 MG/DL — SIGNIFICANT CHANGE UP (ref 0.5–1.3)
EOSINOPHIL # BLD AUTO: 0.1 K/UL — SIGNIFICANT CHANGE UP (ref 0–0.5)
EOSINOPHIL NFR BLD AUTO: 0.8 % — SIGNIFICANT CHANGE UP (ref 0–6)
GLUCOSE SERPL-MCNC: 117 MG/DL — HIGH (ref 70–99)
HCT VFR BLD CALC: 43.1 % — SIGNIFICANT CHANGE UP (ref 39–50)
HGB BLD-MCNC: 14.4 G/DL — SIGNIFICANT CHANGE UP (ref 13–17)
IMM GRANULOCYTES NFR BLD AUTO: 0.2 % — SIGNIFICANT CHANGE UP (ref 0–1.5)
LYMPHOCYTES # BLD AUTO: 1.55 K/UL — SIGNIFICANT CHANGE UP (ref 1–3.3)
LYMPHOCYTES # BLD AUTO: 11.7 % — LOW (ref 13–44)
MCHC RBC-ENTMCNC: 30 PG — SIGNIFICANT CHANGE UP (ref 27–34)
MCHC RBC-ENTMCNC: 33.4 GM/DL — SIGNIFICANT CHANGE UP (ref 32–36)
MCV RBC AUTO: 89.8 FL — SIGNIFICANT CHANGE UP (ref 80–100)
MONOCYTES # BLD AUTO: 0.67 K/UL — SIGNIFICANT CHANGE UP (ref 0–0.9)
MONOCYTES NFR BLD AUTO: 5 % — SIGNIFICANT CHANGE UP (ref 2–14)
NEUTROPHILS # BLD AUTO: 10.88 K/UL — HIGH (ref 1.8–7.4)
NEUTROPHILS NFR BLD AUTO: 82 % — HIGH (ref 43–77)
NRBC # BLD: 0 /100 WBCS — SIGNIFICANT CHANGE UP (ref 0–0)
PLATELET # BLD AUTO: 173 K/UL — SIGNIFICANT CHANGE UP (ref 150–400)
POTASSIUM SERPL-MCNC: 3.6 MMOL/L — SIGNIFICANT CHANGE UP (ref 3.5–5.3)
POTASSIUM SERPL-SCNC: 3.6 MMOL/L — SIGNIFICANT CHANGE UP (ref 3.5–5.3)
PROT SERPL-MCNC: 7.6 G/DL — SIGNIFICANT CHANGE UP (ref 6–8.3)
RBC # BLD: 4.8 M/UL — SIGNIFICANT CHANGE UP (ref 4.2–5.8)
RBC # FLD: 13.9 % — SIGNIFICANT CHANGE UP (ref 10.3–14.5)
SODIUM SERPL-SCNC: 143 MMOL/L — SIGNIFICANT CHANGE UP (ref 135–145)
WBC # BLD: 13.27 K/UL — HIGH (ref 3.8–10.5)
WBC # FLD AUTO: 13.27 K/UL — HIGH (ref 3.8–10.5)

## 2021-02-22 PROCEDURE — 99284 EMERGENCY DEPT VISIT MOD MDM: CPT | Mod: 25

## 2021-02-22 PROCEDURE — 36415 COLL VENOUS BLD VENIPUNCTURE: CPT

## 2021-02-22 PROCEDURE — 80053 COMPREHEN METABOLIC PANEL: CPT

## 2021-02-22 PROCEDURE — 96374 THER/PROPH/DIAG INJ IV PUSH: CPT

## 2021-02-22 PROCEDURE — 93005 ELECTROCARDIOGRAM TRACING: CPT

## 2021-02-22 PROCEDURE — 85025 COMPLETE CBC W/AUTO DIFF WBC: CPT

## 2021-02-22 RX ORDER — METOCLOPRAMIDE HCL 10 MG
10 TABLET ORAL ONCE
Refills: 0 | Status: COMPLETED | OUTPATIENT
Start: 2021-02-22 | End: 2021-02-22

## 2021-02-22 RX ORDER — METOCLOPRAMIDE HCL 10 MG
1 TABLET ORAL
Qty: 9 | Refills: 0
Start: 2021-02-22 | End: 2021-02-24

## 2021-02-22 RX ORDER — SODIUM CHLORIDE 9 MG/ML
1000 INJECTION INTRAMUSCULAR; INTRAVENOUS; SUBCUTANEOUS ONCE
Refills: 0 | Status: COMPLETED | OUTPATIENT
Start: 2021-02-22 | End: 2021-02-22

## 2021-02-22 RX ADMIN — Medication 10 MILLIGRAM(S): at 01:01

## 2021-02-22 RX ADMIN — SODIUM CHLORIDE 1000 MILLILITER(S): 9 INJECTION INTRAMUSCULAR; INTRAVENOUS; SUBCUTANEOUS at 01:01

## 2021-02-22 NOTE — ED ADULT NURSE NOTE - OBJECTIVE STATEMENT
Pt presents to ED c/o abd pain and nausea. Pt was dx with diverticulitis. Pt denies vomiting/ diarrhea.

## 2021-02-22 NOTE — ED ADULT NURSE NOTE - CHIEF COMPLAINT QUOTE
BIBEMS from home C/O nausea, states he has been having a diverticulitis flare up. Followed up with his PMD and was prescribed PO antibiotics.

## 2021-02-25 ENCOUNTER — EMERGENCY (EMERGENCY)
Facility: HOSPITAL | Age: 61
LOS: 1 days | Discharge: HOME | End: 2021-02-25
Attending: INTERNAL MEDICINE | Admitting: INTERNAL MEDICINE
Payer: COMMERCIAL

## 2021-02-25 VITALS
TEMPERATURE: 98 F | DIASTOLIC BLOOD PRESSURE: 86 MMHG | HEART RATE: 102 BPM | RESPIRATION RATE: 18 BRPM | WEIGHT: 220.02 LBS | OXYGEN SATURATION: 98 % | SYSTOLIC BLOOD PRESSURE: 126 MMHG | HEIGHT: 71 IN

## 2021-02-25 DIAGNOSIS — M19.90 UNSPECIFIED OSTEOARTHRITIS, UNSPECIFIED SITE: ICD-10-CM

## 2021-02-25 DIAGNOSIS — Z79.84 LONG TERM (CURRENT) USE OF ORAL HYPOGLYCEMIC DRUGS: ICD-10-CM

## 2021-02-25 DIAGNOSIS — K57.92 DIVERTICULITIS OF INTESTINE, PART UNSPECIFIED, WITHOUT PERFORATION OR ABSCESS WITHOUT BLEEDING: ICD-10-CM

## 2021-02-25 DIAGNOSIS — Z95.5 PRESENCE OF CORONARY ANGIOPLASTY IMPLANT AND GRAFT: Chronic | ICD-10-CM

## 2021-02-25 DIAGNOSIS — Z20.822 CONTACT WITH AND (SUSPECTED) EXPOSURE TO COVID-19: ICD-10-CM

## 2021-02-25 DIAGNOSIS — Z79.82 LONG TERM (CURRENT) USE OF ASPIRIN: ICD-10-CM

## 2021-02-25 DIAGNOSIS — Z98.890 OTHER SPECIFIED POSTPROCEDURAL STATES: ICD-10-CM

## 2021-02-25 DIAGNOSIS — R10.32 LEFT LOWER QUADRANT PAIN: ICD-10-CM

## 2021-02-25 DIAGNOSIS — I10 ESSENTIAL (PRIMARY) HYPERTENSION: ICD-10-CM

## 2021-02-25 DIAGNOSIS — I25.10 ATHEROSCLEROTIC HEART DISEASE OF NATIVE CORONARY ARTERY WITHOUT ANGINA PECTORIS: Chronic | ICD-10-CM

## 2021-02-25 DIAGNOSIS — Q15.9 CONGENITAL MALFORMATION OF EYE, UNSPECIFIED: Chronic | ICD-10-CM

## 2021-02-25 DIAGNOSIS — Z95.5 PRESENCE OF CORONARY ANGIOPLASTY IMPLANT AND GRAFT: ICD-10-CM

## 2021-02-25 DIAGNOSIS — Z95.1 PRESENCE OF AORTOCORONARY BYPASS GRAFT: ICD-10-CM

## 2021-02-25 DIAGNOSIS — E78.5 HYPERLIPIDEMIA, UNSPECIFIED: ICD-10-CM

## 2021-02-25 DIAGNOSIS — R73.03 PREDIABETES: ICD-10-CM

## 2021-02-25 DIAGNOSIS — Z95.1 PRESENCE OF AORTOCORONARY BYPASS GRAFT: Chronic | ICD-10-CM

## 2021-02-25 DIAGNOSIS — I25.10 ATHEROSCLEROTIC HEART DISEASE OF NATIVE CORONARY ARTERY WITHOUT ANGINA PECTORIS: ICD-10-CM

## 2021-02-25 DIAGNOSIS — Z87.01 PERSONAL HISTORY OF PNEUMONIA (RECURRENT): ICD-10-CM

## 2021-02-25 DIAGNOSIS — K46.9 UNSPECIFIED ABDOMINAL HERNIA WITHOUT OBSTRUCTION OR GANGRENE: Chronic | ICD-10-CM

## 2021-02-25 PROCEDURE — 99053 MED SERV 10PM-8AM 24 HR FAC: CPT

## 2021-02-25 PROCEDURE — 99285 EMERGENCY DEPT VISIT HI MDM: CPT

## 2021-02-25 RX ORDER — PIPERACILLIN AND TAZOBACTAM 4; .5 G/20ML; G/20ML
3.38 INJECTION, POWDER, LYOPHILIZED, FOR SOLUTION INTRAVENOUS ONCE
Refills: 0 | Status: COMPLETED | OUTPATIENT
Start: 2021-02-25 | End: 2021-02-25

## 2021-02-25 RX ORDER — SODIUM CHLORIDE 9 MG/ML
1000 INJECTION INTRAMUSCULAR; INTRAVENOUS; SUBCUTANEOUS ONCE
Refills: 0 | Status: COMPLETED | OUTPATIENT
Start: 2021-02-25 | End: 2021-02-25

## 2021-02-25 NOTE — ED PROVIDER NOTE - PATIENT PORTAL LINK FT
You can access the FollowMyHealth Patient Portal offered by [facility] by registering at the following website: [email or web address]. By joining [facility]’s FollowMyHealth portal, you will also be able to view your health information using other applications (apps) compatible with our system.

## 2021-02-25 NOTE — ED PROVIDER NOTE - CLINICAL SUMMARY MEDICAL DECISION MAKING FREE TEXT BOX
increasing LLQ pain  h/o diverticulosis , rx cipro and flagyl  CT negative continue same AB and f/u with Dr ALENA Avalos

## 2021-02-25 NOTE — ED PROVIDER NOTE - NSFOLLOWUPINSTRUCTIONS_ED_ALL_ED_FT
Continue taking cipro and flagyl till completion     Many things can cause abdominal pain. Many times, abdominal pain is not caused by a disease and will improve without treatment. Your health care provider will do a physical exam to determine if there is a dangerous cause of your pain; blood tests and imaging may help determine the cause of your pain. However, in many cases, no cause may be found and you may need further testing as an outpatient. Monitor your abdominal pain for any changes.     SEEK IMMEDIATE MEDICAL CARE IF YOU HAVE ANY OF THE FOLLOWING SYMPTOMS: worsening abdominal pain, uncontrollable vomiting, profuse diarrhea, inability to have bowel movements or pass gas, black or bloody stools, fever accompanying chest pain or back pain, or fainting. These symptoms may represent a serious problem that is an emergency. Do not wait to see if the symptoms will go away. Get medical help right away. Call 911 and do not drive yourself to the hospital.

## 2021-02-25 NOTE — ED ADULT NURSE NOTE - OBJECTIVE STATEMENT
Patient with history of diabetes and diverticulitis presents to the ER with c/o non-radiating left sided abdominal pain.  Patient was seen here in ER and discharged with prescription for anti-nausea medication (did not pick it up).  Patient saw PMD, was given prescription for out patient CT but did not schedule it because he felt better.  Pain and nausea returned again this afternoon, endorses decrease in PO intake.  No medications taken PTA for pain or nausea.  Denies fevers, chills or vomiting.

## 2021-02-25 NOTE — ED ADULT TRIAGE NOTE - CHIEF COMPLAINT QUOTE
reports abdominal pain since last Thursday, was given antibiotics for probable diverticulitis, was given a prescription for outpatient CT. Pt states he was feeling better so he did not have outpatient CT. Pt reports he was in the ED on Sunday, states no CT scan was done. Pt reports his pain and nausea came back.

## 2021-02-25 NOTE — ED PROVIDER NOTE - PSH
CAD (Coronary Artery Disease) of Artery Bypass Graft  2005 - March 17th  CAD (Coronary Artery Disease), Nonautologous Biological Bypass Graft    CAD S/P percutaneous coronary angioplasty  baloon angioplasty Sep 2018  Eye abnormality  left eye amblyopia repaired in 1994  Hernia  repaired  Hernia, Inguinal    Hypercholesterolemia    Pneumonia due to Organism    S/P CABG x 3  2005  3vCABG 2005 (LIMA to LAD, SVG to OM, left radial to Diag)   NYQ  Status post coronary artery stent placement  Cardiac Stent Placed April 30th 2014 @ [facility]  Cardiac Stent X 2 placed January 15th 2017 (piggybacked) @ [facility]

## 2021-02-25 NOTE — ED ADULT NURSE NOTE - PSH
CAD (Coronary Artery Disease) of Artery Bypass Graft  2005 - March 17th  CAD (Coronary Artery Disease), Nonautologous Biological Bypass Graft    CAD S/P percutaneous coronary angioplasty  baloon angioplasty Sep 2018  Eye abnormality  left eye amblyopia repaired in 1994  Hernia  repaired  Hernia, Inguinal    Hypercholesterolemia    Pneumonia due to Organism    S/P CABG x 3  2005  3vCABG 2005 (LIMA to LAD, SVG to OM, left radial to Diag)   NYQ  Status post coronary artery stent placement  Cardiac Stent Placed April 30th 2014 @ [facility]  Cardiac Stent X 2 placed January 15th 2017 (piggybacked) @ [facility]

## 2021-02-25 NOTE — ED PROVIDER NOTE - CARE PROVIDER_API CALL
Negro Avalos)  Gastroenterology  [address] [place] [address]  Phone: [phone]  Fax: [phone]  Follow Up Time: 1-3 Days

## 2021-02-25 NOTE — ED PROVIDER NOTE - OBJECTIVE STATEMENT
reports abdominal pain since last Thursday, was given antibiotics for probable diverticulitis, was given a prescription for outpatient CT. Pt states he was feeling better so he did not have outpatient CT. Pt reports he was in the ED on Sunday, states no CT scan was done. Pt reports his pain and nausea came back.  abdominal pain 59 y/o male h/o diverticulitis, right hernia repair C/C reports abdominal pain since last Thursday, was given cipro and flagyl  for suspect  diverticulitis, Dr Crane office . Pt states he was feeling better until about two days ago when he started to have LLQ discomfort and nausea that has increased this evening. No fever, no chills, no CP, no SOB, no vomiting, no diarrhea, no GIB, no urinary symptoms, no COVID symptoms. 61 y/o male h/o diverticulitis, right hernia repair C/C reports abdominal pain since last Thursday, was given cipro and flagyl  for suspect  diverticulitis, Dr Crane office . Pt states he was feeling better until about two days ago when he started to have LLQ discomfort and nausea that has increased this evening. No fever, no chills, no CP, no SOB, no vomiting, no diarrhea, no GIB, no urinary symptoms, no COVID symptoms. On 2/21/2021 the patient was seen for vagal reaction at  ED

## 2021-02-25 NOTE — ED ADULT NURSE NOTE - NSIMPLEMENTINTERV_GEN_ALL_ED
Implemented All Universal Safety Interventions:  [place] to call system. Call bell, personal items and telephone within reach. Instruct patient to call for assistance. Room bathroom lighting operational. Non-slip footwear when patient is off stretcher. Physically safe environment: no spills, clutter or unnecessary equipment. Stretcher in lowest position, wheels locked, appropriate side rails in place.

## 2021-02-26 VITALS
DIASTOLIC BLOOD PRESSURE: 84 MMHG | TEMPERATURE: 98 F | HEART RATE: 70 BPM | RESPIRATION RATE: 17 BRPM | OXYGEN SATURATION: 99 % | SYSTOLIC BLOOD PRESSURE: 132 MMHG

## 2021-02-26 LAB
ALBUMIN SERPL ELPH-MCNC: 4.2 G/DL — SIGNIFICANT CHANGE UP (ref 3.3–5)
ALP SERPL-CCNC: 105 U/L — SIGNIFICANT CHANGE UP (ref 40–120)
ALT FLD-CCNC: 38 U/L — SIGNIFICANT CHANGE UP (ref 12–78)
ANION GAP SERPL CALC-SCNC: 5 MMOL/L — SIGNIFICANT CHANGE UP (ref 5–17)
APPEARANCE UR: CLEAR — SIGNIFICANT CHANGE UP
APTT BLD: 31.2 SEC — SIGNIFICANT CHANGE UP (ref 27.5–35.5)
AST SERPL-CCNC: 31 U/L — SIGNIFICANT CHANGE UP (ref 15–37)
BACTERIA # UR AUTO: ABNORMAL
BASOPHILS # BLD AUTO: 0.05 K/UL — SIGNIFICANT CHANGE UP (ref 0–0.2)
BASOPHILS NFR BLD AUTO: 0.5 % — SIGNIFICANT CHANGE UP (ref 0–2)
BILIRUB SERPL-MCNC: 0.6 MG/DL — SIGNIFICANT CHANGE UP (ref 0.2–1.2)
BILIRUB UR-MCNC: NEGATIVE — SIGNIFICANT CHANGE UP
BUN SERPL-MCNC: 17 MG/DL — SIGNIFICANT CHANGE UP (ref 7–23)
CALCIUM SERPL-MCNC: 9.2 MG/DL — SIGNIFICANT CHANGE UP (ref 8.5–10.1)
CHLORIDE SERPL-SCNC: 110 MMOL/L — HIGH (ref 96–108)
CO2 SERPL-SCNC: 26 MMOL/L — SIGNIFICANT CHANGE UP (ref 22–31)
COLOR SPEC: YELLOW — SIGNIFICANT CHANGE UP
COMMENT - URINE: SIGNIFICANT CHANGE UP
CREAT SERPL-MCNC: 0.98 MG/DL — SIGNIFICANT CHANGE UP (ref 0.5–1.3)
CULTURE RESULTS: NO GROWTH — SIGNIFICANT CHANGE UP
DIFF PNL FLD: ABNORMAL
EOSINOPHIL # BLD AUTO: 0.07 K/UL — SIGNIFICANT CHANGE UP (ref 0–0.5)
EOSINOPHIL NFR BLD AUTO: 0.7 % — SIGNIFICANT CHANGE UP (ref 0–6)
EPI CELLS # UR: SIGNIFICANT CHANGE UP
GLUCOSE SERPL-MCNC: 100 MG/DL — HIGH (ref 70–99)
GLUCOSE UR QL: 1000 MG/DL
HCT VFR BLD CALC: 41.7 % — SIGNIFICANT CHANGE UP (ref 39–50)
HGB BLD-MCNC: 14 G/DL — SIGNIFICANT CHANGE UP (ref 13–17)
IMM GRANULOCYTES NFR BLD AUTO: 0.5 % — SIGNIFICANT CHANGE UP (ref 0–1.5)
INR BLD: 1.1 RATIO — SIGNIFICANT CHANGE UP (ref 0.88–1.16)
KETONES UR-MCNC: NEGATIVE — SIGNIFICANT CHANGE UP
LACTATE SERPL-SCNC: 0.9 MMOL/L — SIGNIFICANT CHANGE UP (ref 0.7–2)
LEUKOCYTE ESTERASE UR-ACNC: ABNORMAL
LYMPHOCYTES # BLD AUTO: 2.37 K/UL — SIGNIFICANT CHANGE UP (ref 1–3.3)
LYMPHOCYTES # BLD AUTO: 24.5 % — SIGNIFICANT CHANGE UP (ref 13–44)
MCHC RBC-ENTMCNC: 29.9 PG — SIGNIFICANT CHANGE UP (ref 27–34)
MCHC RBC-ENTMCNC: 33.6 GM/DL — SIGNIFICANT CHANGE UP (ref 32–36)
MCV RBC AUTO: 89.1 FL — SIGNIFICANT CHANGE UP (ref 80–100)
MONOCYTES # BLD AUTO: 0.58 K/UL — SIGNIFICANT CHANGE UP (ref 0–0.9)
MONOCYTES NFR BLD AUTO: 6 % — SIGNIFICANT CHANGE UP (ref 2–14)
NEUTROPHILS # BLD AUTO: 6.54 K/UL — SIGNIFICANT CHANGE UP (ref 1.8–7.4)
NEUTROPHILS NFR BLD AUTO: 67.8 % — SIGNIFICANT CHANGE UP (ref 43–77)
NITRITE UR-MCNC: NEGATIVE — SIGNIFICANT CHANGE UP
NRBC # BLD: 0 /100 WBCS — SIGNIFICANT CHANGE UP (ref 0–0)
PH UR: 5 — SIGNIFICANT CHANGE UP (ref 5–8)
PLATELET # BLD AUTO: 190 K/UL — SIGNIFICANT CHANGE UP (ref 150–400)
POTASSIUM SERPL-MCNC: 3.9 MMOL/L — SIGNIFICANT CHANGE UP (ref 3.5–5.3)
POTASSIUM SERPL-SCNC: 3.9 MMOL/L — SIGNIFICANT CHANGE UP (ref 3.5–5.3)
PROT SERPL-MCNC: 7.4 G/DL — SIGNIFICANT CHANGE UP (ref 6–8.3)
PROT UR-MCNC: NEGATIVE — SIGNIFICANT CHANGE UP
PROTHROM AB SERPL-ACNC: 12.8 SEC — SIGNIFICANT CHANGE UP (ref 10.6–13.6)
RBC # BLD: 4.68 M/UL — SIGNIFICANT CHANGE UP (ref 4.2–5.8)
RBC # FLD: 14.2 % — SIGNIFICANT CHANGE UP (ref 10.3–14.5)
RBC CASTS # UR COMP ASSIST: SIGNIFICANT CHANGE UP /HPF (ref 0–4)
SARS-COV-2 RNA SPEC QL NAA+PROBE: SIGNIFICANT CHANGE UP
SODIUM SERPL-SCNC: 141 MMOL/L — SIGNIFICANT CHANGE UP (ref 135–145)
SP GR SPEC: 1.02 — SIGNIFICANT CHANGE UP (ref 1.01–1.02)
SPECIMEN SOURCE: SIGNIFICANT CHANGE UP
UROBILINOGEN FLD QL: NEGATIVE — SIGNIFICANT CHANGE UP
WBC # BLD: 9.66 K/UL — SIGNIFICANT CHANGE UP (ref 3.8–10.5)
WBC # FLD AUTO: 9.66 K/UL — SIGNIFICANT CHANGE UP (ref 3.8–10.5)
WBC UR QL: SIGNIFICANT CHANGE UP

## 2021-02-26 PROCEDURE — 93010 ELECTROCARDIOGRAM REPORT: CPT

## 2021-02-26 PROCEDURE — 87086 URINE CULTURE/COLONY COUNT: CPT

## 2021-02-26 PROCEDURE — 85610 PROTHROMBIN TIME: CPT

## 2021-02-26 PROCEDURE — 36415 COLL VENOUS BLD VENIPUNCTURE: CPT

## 2021-02-26 PROCEDURE — 71045 X-RAY EXAM CHEST 1 VIEW: CPT | Mod: 26

## 2021-02-26 PROCEDURE — 74177 CT ABD & PELVIS W/CONTRAST: CPT

## 2021-02-26 PROCEDURE — 96374 THER/PROPH/DIAG INJ IV PUSH: CPT | Mod: XU

## 2021-02-26 PROCEDURE — 87040 BLOOD CULTURE FOR BACTERIA: CPT

## 2021-02-26 PROCEDURE — 74177 CT ABD & PELVIS W/CONTRAST: CPT | Mod: 26,MA

## 2021-02-26 PROCEDURE — 83605 ASSAY OF LACTIC ACID: CPT

## 2021-02-26 PROCEDURE — 96360 HYDRATION IV INFUSION INIT: CPT

## 2021-02-26 PROCEDURE — 85025 COMPLETE CBC W/AUTO DIFF WBC: CPT

## 2021-02-26 PROCEDURE — 71045 X-RAY EXAM CHEST 1 VIEW: CPT

## 2021-02-26 PROCEDURE — 85730 THROMBOPLASTIN TIME PARTIAL: CPT

## 2021-02-26 PROCEDURE — 93005 ELECTROCARDIOGRAM TRACING: CPT

## 2021-02-26 PROCEDURE — 81001 URINALYSIS AUTO W/SCOPE: CPT

## 2021-02-26 PROCEDURE — 99284 EMERGENCY DEPT VISIT MOD MDM: CPT | Mod: 25

## 2021-02-26 PROCEDURE — 87635 SARS-COV-2 COVID-19 AMP PRB: CPT

## 2021-02-26 PROCEDURE — 80053 COMPREHEN METABOLIC PANEL: CPT

## 2021-02-26 PROCEDURE — 96375 TX/PRO/DX INJ NEW DRUG ADDON: CPT

## 2021-02-26 PROCEDURE — U0005: CPT

## 2021-02-26 RX ORDER — IOHEXOL 300 MG/ML
30 INJECTION, SOLUTION INTRAVENOUS ONCE
Refills: 0 | Status: COMPLETED | OUTPATIENT
Start: 2021-02-26 | End: 2021-02-26

## 2021-02-26 RX ORDER — MORPHINE SULFATE 50 MG/1
4 CAPSULE, EXTENDED RELEASE ORAL ONCE
Refills: 0 | Status: DISCONTINUED | OUTPATIENT
Start: 2021-02-26 | End: 2021-02-26

## 2021-02-26 RX ORDER — ONDANSETRON 8 MG/1
4 TABLET, FILM COATED ORAL ONCE
Refills: 0 | Status: COMPLETED | OUTPATIENT
Start: 2021-02-26 | End: 2021-02-26

## 2021-02-26 RX ADMIN — PIPERACILLIN AND TAZOBACTAM 200 GRAM(S): 4; .5 INJECTION, POWDER, LYOPHILIZED, FOR SOLUTION INTRAVENOUS at 00:30

## 2021-02-26 RX ADMIN — SODIUM CHLORIDE 1000 MILLILITER(S): 9 INJECTION INTRAMUSCULAR; INTRAVENOUS; SUBCUTANEOUS at 00:10

## 2021-02-26 RX ADMIN — MORPHINE SULFATE 4 MILLIGRAM(S): 50 CAPSULE, EXTENDED RELEASE ORAL at 00:30

## 2021-02-26 RX ADMIN — IOHEXOL 30 MILLILITER(S): 300 INJECTION, SOLUTION INTRAVENOUS at 00:30

## 2021-02-26 RX ADMIN — SODIUM CHLORIDE 1000 MILLILITER(S): 9 INJECTION INTRAMUSCULAR; INTRAVENOUS; SUBCUTANEOUS at 01:30

## 2021-02-26 RX ADMIN — ONDANSETRON 4 MILLIGRAM(S): 8 TABLET, FILM COATED ORAL at 00:29

## 2021-02-26 RX ADMIN — PIPERACILLIN AND TAZOBACTAM 3.38 GRAM(S): 4; .5 INJECTION, POWDER, LYOPHILIZED, FOR SOLUTION INTRAVENOUS at 01:00

## 2021-02-26 NOTE — ED ADULT NURSE REASSESSMENT NOTE - NS ED NURSE REASSESS COMMENT FT1
Warm blanket given to patient.  VSS.  Pending CT result.  Call bell within reach, safety maintained.

## 2021-02-26 NOTE — ED ADULT NURSE REASSESSMENT NOTE - NS ED NURSE REASSESS COMMENT FT1
Patient is drinking oral contrast, is aware of pending CT in about two hours.  Call bell is within reach, tv remote given to patient.  Stretcher in lowest position with side rails up and safety maintained.

## 2021-03-01 ENCOUNTER — APPOINTMENT (OUTPATIENT)
Dept: CT IMAGING | Facility: CLINIC | Age: 61
End: 2021-03-01

## 2021-04-07 ENCOUNTER — NON-APPOINTMENT (OUTPATIENT)
Age: 61
End: 2021-04-07

## 2021-04-07 ENCOUNTER — APPOINTMENT (OUTPATIENT)
Dept: CARDIOLOGY | Facility: CLINIC | Age: 61
End: 2021-04-07
Payer: COMMERCIAL

## 2021-04-07 VITALS
DIASTOLIC BLOOD PRESSURE: 84 MMHG | SYSTOLIC BLOOD PRESSURE: 124 MMHG | HEART RATE: 79 BPM | WEIGHT: 229 LBS | TEMPERATURE: 97.6 F | BODY MASS INDEX: 32.06 KG/M2 | HEIGHT: 71 IN | RESPIRATION RATE: 14 BRPM | OXYGEN SATURATION: 97 %

## 2021-04-07 PROCEDURE — 99214 OFFICE O/P EST MOD 30 MIN: CPT

## 2021-04-07 PROCEDURE — 99072 ADDL SUPL MATRL&STAF TM PHE: CPT

## 2021-04-07 PROCEDURE — 93000 ELECTROCARDIOGRAM COMPLETE: CPT

## 2021-04-08 NOTE — DISCUSSION/SUMMARY
[Coronary Artery Disease] : coronary artery disease [Hypertension] : hypertension [Stable] : stable [FreeTextEntry1] : \par Currently stable from a cardiovascular standpoint. Normotensive. Euvolemic. Stable CAD with preserved LV systolic function (s/p CABG, s/p multiple PCI of prox SVG-OM). Asymptomatic. Continue current medications. ECG completed today and reviewed (findings as noted above). Follow up in 4 months.

## 2021-04-08 NOTE — PHYSICAL EXAM
[General Appearance - Well Developed] : well developed [Normal Appearance] : normal appearance [Well Groomed] : well groomed [General Appearance - Well Nourished] : well nourished [No Deformities] : no deformities [General Appearance - In No Acute Distress] : no acute distress [Normal Conjunctiva] : the conjunctiva exhibited no abnormalities [Eyelids - No Xanthelasma] : the eyelids demonstrated no xanthelasmas [Normal Oral Mucosa] : normal oral mucosa [No Oral Pallor] : no oral pallor [No Oral Cyanosis] : no oral cyanosis [Respiration, Rhythm And Depth] : normal respiratory rhythm and effort [Exaggerated Use Of Accessory Muscles For Inspiration] : no accessory muscle use [Auscultation Breath Sounds / Voice Sounds] : lungs were clear to auscultation bilaterally [Heart Rate And Rhythm] : heart rate and rhythm were normal [Heart Sounds] : normal S1 and S2 [Murmurs] : no murmurs present [Edema] : no peripheral edema present [Abdomen Tenderness] : non-tender [Abdomen Soft] : soft [Abnormal Walk] : normal gait [Gait - Sufficient For Exercise Testing] : the gait was sufficient for exercise testing [Cyanosis, Localized] : no localized cyanosis [Skin Color & Pigmentation] : normal skin color and pigmentation [] : no rash [Oriented To Time, Place, And Person] : oriented to person, place, and time [Affect] : the affect was normal [Mood] : the mood was normal [No Anxiety] : not feeling anxious [FreeTextEntry1] : no carotid bruits or JVD

## 2021-04-08 NOTE — HISTORY OF PRESENT ILLNESS
[FreeTextEntry1] : Doing okay. Denies chest pain, shortness of breath or palpitations. Has lost some weight.

## 2021-07-19 NOTE — BRIEF OPERATIVE NOTE - ESTIMATED BLOOD LOSS
SHIFT: 0700 - 1930  Pt this shift was stable. Alert and oriented. Denies having pain but does have SOB r/t lobectomy. Pt is currently on oxygen 2 liters via NC and states this is the amount of oxygen at home. 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 At 1040 this nurse removed pt mg. Pt tolerated well. Pt started on Flomax. Pt is urinating and PVR was 87 ml. Pt denies any burning or itching upon urination. Pt verbalized having back pain. Pt was medicated with PRN Dilaudid. Medication, rest position and distraction was effective. Pt also verbalized having nausea after breakfast. Pt was medicated with Zofran. Zofran was effective. Pt had small BM this shift. Pt ambulated to bathroom with supervision. Pt at end of shift was up in chair with call light with in reach. Chair alarm on to promote pt safety.      Electronically signed by Christopher Purdy RN on 7/19/2021 at 6:44 PM 5

## 2021-08-30 ENCOUNTER — EMERGENCY (EMERGENCY)
Facility: HOSPITAL | Age: 61
LOS: 1 days | Discharge: HOME | End: 2021-08-30
Attending: STUDENT IN AN ORGANIZED HEALTH CARE EDUCATION/TRAINING PROGRAM | Admitting: STUDENT IN AN ORGANIZED HEALTH CARE EDUCATION/TRAINING PROGRAM
Payer: COMMERCIAL

## 2021-08-30 VITALS
TEMPERATURE: 98 F | OXYGEN SATURATION: 95 % | SYSTOLIC BLOOD PRESSURE: 119 MMHG | DIASTOLIC BLOOD PRESSURE: 80 MMHG | RESPIRATION RATE: 16 BRPM | HEART RATE: 82 BPM

## 2021-08-30 VITALS
WEIGHT: 216.05 LBS | OXYGEN SATURATION: 97 % | HEART RATE: 95 BPM | RESPIRATION RATE: 16 BRPM | TEMPERATURE: 99 F | DIASTOLIC BLOOD PRESSURE: 94 MMHG | HEIGHT: 71 IN | SYSTOLIC BLOOD PRESSURE: 134 MMHG

## 2021-08-30 DIAGNOSIS — K46.9 UNSPECIFIED ABDOMINAL HERNIA WITHOUT OBSTRUCTION OR GANGRENE: Chronic | ICD-10-CM

## 2021-08-30 DIAGNOSIS — Q15.9 CONGENITAL MALFORMATION OF EYE, UNSPECIFIED: Chronic | ICD-10-CM

## 2021-08-30 DIAGNOSIS — Z95.5 PRESENCE OF CORONARY ANGIOPLASTY IMPLANT AND GRAFT: Chronic | ICD-10-CM

## 2021-08-30 DIAGNOSIS — I25.10 ATHEROSCLEROTIC HEART DISEASE OF NATIVE CORONARY ARTERY WITHOUT ANGINA PECTORIS: Chronic | ICD-10-CM

## 2021-08-30 DIAGNOSIS — Z95.1 PRESENCE OF AORTOCORONARY BYPASS GRAFT: Chronic | ICD-10-CM

## 2021-08-30 LAB
ALBUMIN SERPL ELPH-MCNC: 4 G/DL — SIGNIFICANT CHANGE UP (ref 3.3–5)
ALP SERPL-CCNC: 109 U/L — SIGNIFICANT CHANGE UP (ref 40–120)
ALT FLD-CCNC: 30 U/L — SIGNIFICANT CHANGE UP (ref 12–78)
ANION GAP SERPL CALC-SCNC: 8 MMOL/L — SIGNIFICANT CHANGE UP (ref 5–17)
APTT BLD: 22.7 SEC — LOW (ref 27.5–35.5)
AST SERPL-CCNC: 20 U/L — SIGNIFICANT CHANGE UP (ref 15–37)
BILIRUB SERPL-MCNC: 0.9 MG/DL — SIGNIFICANT CHANGE UP (ref 0.2–1.2)
BUN SERPL-MCNC: 15 MG/DL — SIGNIFICANT CHANGE UP (ref 7–23)
CALCIUM SERPL-MCNC: 9.3 MG/DL — SIGNIFICANT CHANGE UP (ref 8.5–10.1)
CHLORIDE SERPL-SCNC: 107 MMOL/L — SIGNIFICANT CHANGE UP (ref 96–108)
CK MB BLD-MCNC: 0.4 % — SIGNIFICANT CHANGE UP (ref 0–3.5)
CK MB CFR SERPL CALC: 1.6 NG/ML — SIGNIFICANT CHANGE UP (ref 0–3.6)
CK SERPL-CCNC: 410 U/L — HIGH (ref 26–308)
CO2 SERPL-SCNC: 25 MMOL/L — SIGNIFICANT CHANGE UP (ref 22–31)
CREAT SERPL-MCNC: 1 MG/DL — SIGNIFICANT CHANGE UP (ref 0.5–1.3)
GLUCOSE SERPL-MCNC: 119 MG/DL — HIGH (ref 70–99)
HCT VFR BLD CALC: 42.6 % — SIGNIFICANT CHANGE UP (ref 39–50)
HGB BLD-MCNC: 14.5 G/DL — SIGNIFICANT CHANGE UP (ref 13–17)
INR BLD: 1.08 RATIO — SIGNIFICANT CHANGE UP (ref 0.88–1.16)
LIDOCAIN IGE QN: 217 U/L — SIGNIFICANT CHANGE UP (ref 73–393)
MCHC RBC-ENTMCNC: 30 PG — SIGNIFICANT CHANGE UP (ref 27–34)
MCHC RBC-ENTMCNC: 34 GM/DL — SIGNIFICANT CHANGE UP (ref 32–36)
MCV RBC AUTO: 88.2 FL — SIGNIFICANT CHANGE UP (ref 80–100)
NRBC # BLD: 0 /100 WBCS — SIGNIFICANT CHANGE UP (ref 0–0)
PLATELET # BLD AUTO: 192 K/UL — SIGNIFICANT CHANGE UP (ref 150–400)
POTASSIUM SERPL-MCNC: 3.7 MMOL/L — SIGNIFICANT CHANGE UP (ref 3.5–5.3)
POTASSIUM SERPL-SCNC: 3.7 MMOL/L — SIGNIFICANT CHANGE UP (ref 3.5–5.3)
PROT SERPL-MCNC: 8 G/DL — SIGNIFICANT CHANGE UP (ref 6–8.3)
PROTHROM AB SERPL-ACNC: 12.6 SEC — SIGNIFICANT CHANGE UP (ref 10.6–13.6)
RBC # BLD: 4.83 M/UL — SIGNIFICANT CHANGE UP (ref 4.2–5.8)
RBC # FLD: 13.5 % — SIGNIFICANT CHANGE UP (ref 10.3–14.5)
SODIUM SERPL-SCNC: 140 MMOL/L — SIGNIFICANT CHANGE UP (ref 135–145)
TROPONIN I SERPL-MCNC: <.015 NG/ML — SIGNIFICANT CHANGE UP (ref 0.01–0.04)
WBC # BLD: 13.31 K/UL — HIGH (ref 3.8–10.5)
WBC # FLD AUTO: 13.31 K/UL — HIGH (ref 3.8–10.5)

## 2021-08-30 PROCEDURE — 83690 ASSAY OF LIPASE: CPT

## 2021-08-30 PROCEDURE — 84484 ASSAY OF TROPONIN QUANT: CPT

## 2021-08-30 PROCEDURE — 85610 PROTHROMBIN TIME: CPT

## 2021-08-30 PROCEDURE — 99285 EMERGENCY DEPT VISIT HI MDM: CPT

## 2021-08-30 PROCEDURE — 99284 EMERGENCY DEPT VISIT MOD MDM: CPT | Mod: 25

## 2021-08-30 PROCEDURE — 85027 COMPLETE CBC AUTOMATED: CPT

## 2021-08-30 PROCEDURE — 85730 THROMBOPLASTIN TIME PARTIAL: CPT

## 2021-08-30 PROCEDURE — 82550 ASSAY OF CK (CPK): CPT

## 2021-08-30 PROCEDURE — 74177 CT ABD & PELVIS W/CONTRAST: CPT | Mod: 26,MA

## 2021-08-30 PROCEDURE — 36415 COLL VENOUS BLD VENIPUNCTURE: CPT

## 2021-08-30 PROCEDURE — 71045 X-RAY EXAM CHEST 1 VIEW: CPT | Mod: 26

## 2021-08-30 PROCEDURE — 80053 COMPREHEN METABOLIC PANEL: CPT

## 2021-08-30 PROCEDURE — 93010 ELECTROCARDIOGRAM REPORT: CPT

## 2021-08-30 PROCEDURE — 74177 CT ABD & PELVIS W/CONTRAST: CPT | Mod: MA

## 2021-08-30 PROCEDURE — 93005 ELECTROCARDIOGRAM TRACING: CPT

## 2021-08-30 PROCEDURE — 82553 CREATINE MB FRACTION: CPT

## 2021-08-30 PROCEDURE — 71045 X-RAY EXAM CHEST 1 VIEW: CPT

## 2021-08-30 RX ORDER — ONDANSETRON 8 MG/1
1 TABLET, FILM COATED ORAL
Qty: 6 | Refills: 0
Start: 2021-08-30 | End: 2021-08-31

## 2021-08-30 NOTE — ED PROVIDER NOTE - CLINICAL SUMMARY MEDICAL DECISION MAKING FREE TEXT BOX
62yo with extensive cardiac history pw nausea x 1 month, worsening in alst week, ekg with q waves inferior leads at urgent care so sent o ed, q waves unchaged from prior and no cardiac symptioms, will check labs lytes, ct abd, reassess

## 2021-08-30 NOTE — ED PROVIDER NOTE - PATIENT PORTAL LINK FT
You can access the FollowMyHealth Patient Portal offered by [facility] by registering at the following website: [email or web address]. By joining [facility]’s FollowMyHealth portal, you will also be able to view your health information using other applications (apps) compatible with our system.

## 2021-08-30 NOTE — ED PROVIDER NOTE - PHYSICAL EXAMINATION
Gen: Well appearing in NAD  Head: NC/AT  Neck: trachea midline  cv: rrr  Resp:  No distress, lungs clear   abd nontender   Ext: no deformities  Neuro:  A&O appears non focal  Skin:  Warm and dry as visualized  Psych:  Normal affect and mood

## 2021-08-30 NOTE — ED ADULT TRIAGE NOTE - CHIEF COMPLAINT QUOTE
pt c/o abd pain with nausea x 3-4 weeks, pt reports increased stress, and night sweats, went to urgent care and sent for abnormal ekg evaluation

## 2021-08-30 NOTE — ED PROVIDER NOTE - OBJECTIVE STATEMENT
62yo M ho CAD, CABg, htn, pre dm, went to urgent care today for evaluation of nausea x 1 month with weight lsos and sweats at night, pt noted to have abnormal ekg in urgent care so sent to ed for eval, denies chest pain or sob, ekg in ed unchanged from prior with q waves in inferior leads,   pt states nausea has been worsneing in last week. no fevers, chills, abd pain, vomiting, + decreased PO, no diarrhea 60yo M ho CAD, CABg, htn, pre dm, went to urgent care today for evaluation of nausea x 1 month with weight lsos and sweats at night, pt noted to have abnormal ekg in urgent care so sent to ed for eval, denies chest pain or sob, ekg in ed unchanged from prior with q waves in inferior leads,   pt states nausea has been worsening in last week. no fevers, chills, abd pain, vomiting, + decreased PO, no diarrhea

## 2021-08-30 NOTE — ED ADULT NURSE NOTE - OBJECTIVE STATEMENT
Pt received sitting on stretcher in NAD. Pt AOx3 C/o being at urgent care and sent here for R/o MI/ pt states he hasn't been feeling himself for 2 weeks. but today was feel ing nauseous an and epi gastric pain that comes and goes.  Neuro WNL. PERRLA. Lungs CTA, RR even unlabored. Ab soft non tender, + bowel sounds x 4quads. Denies Vomiting, Diarrhea. Skin warm, dry, color appropriate for age and race.
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
## 2021-08-30 NOTE — ED PROVIDER NOTE - NSICDXPASTSURGICALHX_GEN_ALL_CORE_FT
PAST SURGICAL HISTORY:  CAD (Coronary Artery Disease) of Artery Bypass Graft 2005 - March 17th    CAD (Coronary Artery Disease), Nonautologous Biological Bypass Graft     CAD S/P percutaneous coronary angioplasty baloon angioplasty Sep 2018    Eye abnormality left eye amblyopia repaired in 1994    Hernia repaired    Hernia, Inguinal     Hypercholesterolemia     Pneumonia due to Organism     S/P CABG x 3 2005  3vCABG 2005 (LIMA to LAD, SVG to OM, left radial to Diag)   NYQ    Status post coronary artery stent placement Cardiac Stent Placed April 30th 2014 @ [facility]  Cardiac Stent X 2 placed January 15th 2017 (piggybacked) @ [facility]

## 2021-08-30 NOTE — ED PROVIDER NOTE - NSFOLLOWUPINSTRUCTIONS_ED_ALL_ED_FT
Please follow up with your primary care doctor tomorrow. Follow up with GI  Return for new or worsening symptoms or chest pain, shortness of breath      [facility]                                                                                     Nausea, Adult    Nausea is the feeling that you have an upset stomach or that you are about to vomit. Nausea on its own is not usually a serious concern, but it may be an early sign of a more serious medical problem. As nausea gets worse, it can lead to vomiting. If vomiting develops, or if you are not able to drink enough fluids, you are at risk of becoming dehydrated. Dehydration can make you tired and thirsty, cause you to have a dry mouth, and decrease how often you urinate. Older adults and people with other diseases or a weak disease-fighting system (immune system) are at higher risk for dehydration. The main goals of treating your nausea are:  •To relieve your nausea.      •To limit repeated nausea episodes.      •To prevent vomiting and dehydration.        Follow these instructions at home:    Watch your symptoms for any changes. Tell your health care provider about them. Follow these instructions as told by your health care provider.      Eating and drinking                   •Take an oral rehydration solution (ORS). This is a drink that is sold at pharmacies and retail stores.      •Drink clear fluids slowly and in small amounts as you are able. Clear fluids include water, ice chips, low-calorie sports drinks, and fruit juice that has water added (diluted fruit juice).      •Eat bland, easy-to-digest foods in small amounts as you are able. These foods include bananas, applesauce, rice, lean meats, toast, and crackers.      •Avoid drinking fluids that contain a lot of sugar or caffeine, such as energy drinks, sports drinks, and soda.      •Avoid alcohol.      •Avoid spicy or fatty foods.      General instructions     •Take over-the-counter and prescription medicines only as told by your health care provider.      •Rest at home while you recover.      •Drink enough fluid to keep your urine pale yellow.      •Breathe slowly and deeply when you feel nauseous.      •Avoid smelling things that have strong odors.      •Wash your hands often using soap and water. If soap and water are not available, use hand .      •Make sure that all people in your household wash their hands well and often.      •Keep all follow-up visits as told by your health care provider. This is important.        Contact a health care provider if:    •Your nausea gets worse.      •Your nausea does not go away after two days.      •You vomit.      •You cannot drink fluids without vomiting.    •You have any of the following:  •New symptoms.      •A fever.      •A headache.      •Muscle cramps.      •A rash.      •Pain while urinating.        •You feel light-headed or dizzy.        Get help right away if:    •You have pain in your chest, neck, arm, or jaw.      •You feel extremely weak or you faint.      •You have vomit that is bright red or looks like coffee grounds.      •You have bloody or black stools or stools that look like tar.      •You have a severe headache, a stiff neck, or both.      •You have severe pain, cramping, or bloating in your abdomen.      •You have difficulty breathing or are breathing very quickly.      •Your heart is beating very quickly.      •Your skin feels cold and clammy.      •You feel confused.    •You have signs of dehydration, such as:  •Dark urine, very little urine, or no urine.      •Cracked lips.      •Dry mouth.      •Sunken eyes.      •Sleepiness.      •Weakness.        These symptoms may represent a serious problem that is an emergency. Do not wait to see if the symptoms will go away. Get medical help right away. Call your local emergency services (911 in the [place] Do not drive yourself to the hospital.       Summary    •Nausea is the feeling that you have an upset stomach or that you are about to vomit. Nausea on its own is not usually a serious concern, but it may be an early sign of a more serious medical problem.      •If vomiting develops, or if you are not able to drink enough fluids, you are at risk of becoming dehydrated.      •Follow recommendations for eating and drinking and take over-the-counter and prescription medicines only as told by your health care provider.      •Contact a health care provider right away if your symptoms worsen or you have new symptoms.      •Keep all follow-up visits as told by your health care provider. This is important.      This information is not intended to replace advice given to you by your health care provider. Make sure you discuss any questions you have with your health care provider.      Document Revised: 11/17/2020 Document Reviewed: 05/28/2019    Elsevier Patient Education © 2021 [employer]

## 2021-08-30 NOTE — ED PROVIDER NOTE - PROGRESS NOTE DETAILS
trop negative, still denies any cardiac complaints only nause x 1 month, no acute findings on CT and labs wnl, pt to see his pcp tomorrow

## 2021-10-07 ENCOUNTER — NON-APPOINTMENT (OUTPATIENT)
Age: 61
End: 2021-10-07

## 2021-10-07 ENCOUNTER — APPOINTMENT (OUTPATIENT)
Dept: CARDIOLOGY | Facility: CLINIC | Age: 61
End: 2021-10-07
Payer: COMMERCIAL

## 2021-10-07 VITALS
RESPIRATION RATE: 14 BRPM | TEMPERATURE: 97.3 F | HEIGHT: 71 IN | HEART RATE: 78 BPM | WEIGHT: 229 LBS | BODY MASS INDEX: 32.06 KG/M2 | OXYGEN SATURATION: 97 % | DIASTOLIC BLOOD PRESSURE: 81 MMHG | SYSTOLIC BLOOD PRESSURE: 125 MMHG

## 2021-10-07 PROCEDURE — 99214 OFFICE O/P EST MOD 30 MIN: CPT

## 2021-10-07 PROCEDURE — 93000 ELECTROCARDIOGRAM COMPLETE: CPT

## 2021-10-07 NOTE — HISTORY OF PRESENT ILLNESS
[FreeTextEntry1] : Doing okay. Has been feeling more extra heart beats/palpitations lately which does not seem to correlate with exertion. Denies chest pain or shortness of breath. Mother passed away a few weeks ago from a stroke. Denies dizziness or lightheadedness.

## 2021-10-07 NOTE — DISCUSSION/SUMMARY
[Coronary Artery Disease] : coronary artery disease [Hypertension] : hypertension [Stable] : stable [FreeTextEntry1] : \par Currently stable from a cardiovascular standpoint. Normotensive. Euvolemic. Stable CAD (s/p CABG, s/p multiple stents). No ischemic or CHF symptoms. Palpitations likely secondary to premature beats. Continue current medications. ECG completed today and reviewed. Given cardiac history, risk factors, and symptoms of palpitations, will schedule an exercise nuclear stress test to rule out any significant ischemia. In addition, will schedule an echocardiogram to reassess his cardiac structures and function. Pending test results, I will make further recommendations. Follow up in 4-6 months.

## 2021-10-07 NOTE — CARDIOLOGY SUMMARY
[de-identified] : \par 10/07/21 - normal sinus rhythm, nonspecific ST abnormality\par  [de-identified] : \par 08/28/19 (exercise ECG) - 6 METS, 93% MPHR, 05:41 min, no CP, no ECG abnormalities, Carballo score 5\par 01/12/17 (exercise myoview) - 6 METS, 96% MPHR, 06:01 min, Duke score 6, medium sized, mild to moderate defects in inferior and inferolateral walls that are reversible, suggestive of ischemia, LVEF 54% with normal wall motion\par  [de-identified] : \par 08/28/18 - normal LA, normal LV and RV size and function, LVEF 67%\par  [de-identified] : \par 09/05/18 (PCI) - POBA pSVG-OM1 80% ISR ->30%\par 09/05/18 (DIAG) - dLM 20%, mLAD 100%, pCx 60%, oOM1 100%, mRCA 30%, pRPLS 60%, patent LIMA-LAD, patent SVG-D1 y graft from LIMA, SVG-OM1 p80% ISR, p40%, m50%, d60%\par 01/19/17 (PCI) - PROMUS PREMIER stents to SVG-OM1 o90% and p80%\par 04/30/14 (PCI) - PROMUS PREMIER stent to pSVG-OM1 80%\par  [de-identified] : \par 3/2005 ([facility]G @[facility]) - triple bypass

## 2021-10-15 ENCOUNTER — INPATIENT (INPATIENT)
Facility: HOSPITAL | Age: 61
LOS: 1 days | Discharge: HOME | DRG: 311 | End: 2021-10-17
Attending: INTERNAL MEDICINE | Admitting: INTERNAL MEDICINE
Payer: COMMERCIAL

## 2021-10-15 VITALS
HEIGHT: 71 IN | DIASTOLIC BLOOD PRESSURE: 82 MMHG | OXYGEN SATURATION: 98 % | HEART RATE: 112 BPM | TEMPERATURE: 99 F | RESPIRATION RATE: 16 BRPM | SYSTOLIC BLOOD PRESSURE: 130 MMHG | WEIGHT: 222.01 LBS

## 2021-10-15 DIAGNOSIS — Z29.9 ENCOUNTER FOR PROPHYLACTIC MEASURES, UNSPECIFIED: ICD-10-CM

## 2021-10-15 DIAGNOSIS — E78.5 HYPERLIPIDEMIA, UNSPECIFIED: ICD-10-CM

## 2021-10-15 DIAGNOSIS — Q15.9 CONGENITAL MALFORMATION OF EYE, UNSPECIFIED: Chronic | ICD-10-CM

## 2021-10-15 DIAGNOSIS — Z95.1 PRESENCE OF AORTOCORONARY BYPASS GRAFT: Chronic | ICD-10-CM

## 2021-10-15 DIAGNOSIS — R07.9 CHEST PAIN, UNSPECIFIED: ICD-10-CM

## 2021-10-15 DIAGNOSIS — Z95.5 PRESENCE OF CORONARY ANGIOPLASTY IMPLANT AND GRAFT: Chronic | ICD-10-CM

## 2021-10-15 DIAGNOSIS — R77.8 OTHER SPECIFIED ABNORMALITIES OF PLASMA PROTEINS: ICD-10-CM

## 2021-10-15 DIAGNOSIS — F41.9 ANXIETY DISORDER, UNSPECIFIED: ICD-10-CM

## 2021-10-15 DIAGNOSIS — K46.9 UNSPECIFIED ABDOMINAL HERNIA WITHOUT OBSTRUCTION OR GANGRENE: Chronic | ICD-10-CM

## 2021-10-15 DIAGNOSIS — I25.10 ATHEROSCLEROTIC HEART DISEASE OF NATIVE CORONARY ARTERY WITHOUT ANGINA PECTORIS: ICD-10-CM

## 2021-10-15 DIAGNOSIS — I25.10 ATHEROSCLEROTIC HEART DISEASE OF NATIVE CORONARY ARTERY WITHOUT ANGINA PECTORIS: Chronic | ICD-10-CM

## 2021-10-15 DIAGNOSIS — I10 ESSENTIAL (PRIMARY) HYPERTENSION: ICD-10-CM

## 2021-10-15 DIAGNOSIS — R73.03 PREDIABETES: ICD-10-CM

## 2021-10-15 LAB
ALBUMIN SERPL ELPH-MCNC: 3.9 G/DL — SIGNIFICANT CHANGE UP (ref 3.3–5)
ALP SERPL-CCNC: 108 U/L — SIGNIFICANT CHANGE UP (ref 40–120)
ALT FLD-CCNC: 35 U/L — SIGNIFICANT CHANGE UP (ref 12–78)
ANION GAP SERPL CALC-SCNC: 5 MMOL/L — SIGNIFICANT CHANGE UP (ref 5–17)
AST SERPL-CCNC: 34 U/L — SIGNIFICANT CHANGE UP (ref 15–37)
BASOPHILS # BLD AUTO: 0.04 K/UL — SIGNIFICANT CHANGE UP (ref 0–0.2)
BASOPHILS NFR BLD AUTO: 0.3 % — SIGNIFICANT CHANGE UP (ref 0–2)
BILIRUB SERPL-MCNC: 1.3 MG/DL — HIGH (ref 0.2–1.2)
BUN SERPL-MCNC: 17 MG/DL — SIGNIFICANT CHANGE UP (ref 7–23)
CALCIUM SERPL-MCNC: 9.7 MG/DL — SIGNIFICANT CHANGE UP (ref 8.5–10.1)
CHLORIDE SERPL-SCNC: 106 MMOL/L — SIGNIFICANT CHANGE UP (ref 96–108)
CK MB BLD-MCNC: 0.4 % — SIGNIFICANT CHANGE UP (ref 0–3.5)
CK MB BLD-MCNC: 0.6 % — SIGNIFICANT CHANGE UP (ref 0–3.5)
CK MB CFR SERPL CALC: 1.5 NG/ML — SIGNIFICANT CHANGE UP (ref 0–3.6)
CK MB CFR SERPL CALC: 2 NG/ML — SIGNIFICANT CHANGE UP (ref 0–3.6)
CK SERPL-CCNC: 355 U/L — HIGH (ref 26–308)
CK SERPL-CCNC: 419 U/L — HIGH (ref 26–308)
CO2 SERPL-SCNC: 26 MMOL/L — SIGNIFICANT CHANGE UP (ref 22–31)
CREAT SERPL-MCNC: 1.2 MG/DL — SIGNIFICANT CHANGE UP (ref 0.5–1.3)
D DIMER BLD IA.RAPID-MCNC: <150 NG/ML DDU — SIGNIFICANT CHANGE UP
EOSINOPHIL # BLD AUTO: 0.02 K/UL — SIGNIFICANT CHANGE UP (ref 0–0.5)
EOSINOPHIL NFR BLD AUTO: 0.2 % — SIGNIFICANT CHANGE UP (ref 0–6)
GLUCOSE SERPL-MCNC: 127 MG/DL — HIGH (ref 70–99)
HCT VFR BLD CALC: 42.5 % — SIGNIFICANT CHANGE UP (ref 39–50)
HGB BLD-MCNC: 14.2 G/DL — SIGNIFICANT CHANGE UP (ref 13–17)
IMM GRANULOCYTES NFR BLD AUTO: 0.4 % — SIGNIFICANT CHANGE UP (ref 0–1.5)
LYMPHOCYTES # BLD AUTO: 1.24 K/UL — SIGNIFICANT CHANGE UP (ref 1–3.3)
LYMPHOCYTES # BLD AUTO: 10.8 % — LOW (ref 13–44)
MCHC RBC-ENTMCNC: 30.4 PG — SIGNIFICANT CHANGE UP (ref 27–34)
MCHC RBC-ENTMCNC: 33.4 GM/DL — SIGNIFICANT CHANGE UP (ref 32–36)
MCV RBC AUTO: 91 FL — SIGNIFICANT CHANGE UP (ref 80–100)
MONOCYTES # BLD AUTO: 0.49 K/UL — SIGNIFICANT CHANGE UP (ref 0–0.9)
MONOCYTES NFR BLD AUTO: 4.3 % — SIGNIFICANT CHANGE UP (ref 2–14)
NEUTROPHILS # BLD AUTO: 9.61 K/UL — HIGH (ref 1.8–7.4)
NEUTROPHILS NFR BLD AUTO: 84 % — HIGH (ref 43–77)
NRBC # BLD: 0 /100 WBCS — SIGNIFICANT CHANGE UP (ref 0–0)
PLATELET # BLD AUTO: 206 K/UL — SIGNIFICANT CHANGE UP (ref 150–400)
POTASSIUM SERPL-MCNC: 4.1 MMOL/L — SIGNIFICANT CHANGE UP (ref 3.5–5.3)
POTASSIUM SERPL-SCNC: 4.1 MMOL/L — SIGNIFICANT CHANGE UP (ref 3.5–5.3)
PROT SERPL-MCNC: 8.2 G/DL — SIGNIFICANT CHANGE UP (ref 6–8.3)
RBC # BLD: 4.67 M/UL — SIGNIFICANT CHANGE UP (ref 4.2–5.8)
RBC # FLD: 14.1 % — SIGNIFICANT CHANGE UP (ref 10.3–14.5)
SARS-COV-2 RNA SPEC QL NAA+PROBE: SIGNIFICANT CHANGE UP
SODIUM SERPL-SCNC: 137 MMOL/L — SIGNIFICANT CHANGE UP (ref 135–145)
TROPONIN I SERPL-MCNC: 0.23 NG/ML — HIGH (ref 0.01–0.04)
TROPONIN I SERPL-MCNC: 0.24 NG/ML — HIGH (ref 0.01–0.04)
WBC # BLD: 11.45 K/UL — HIGH (ref 3.8–10.5)
WBC # FLD AUTO: 11.45 K/UL — HIGH (ref 3.8–10.5)

## 2021-10-15 PROCEDURE — 71250 CT THORAX DX C-: CPT | Mod: 26,MA

## 2021-10-15 PROCEDURE — 99285 EMERGENCY DEPT VISIT HI MDM: CPT

## 2021-10-15 PROCEDURE — 71045 X-RAY EXAM CHEST 1 VIEW: CPT | Mod: 26

## 2021-10-15 PROCEDURE — 99223 1ST HOSP IP/OBS HIGH 75: CPT | Mod: GC

## 2021-10-15 RX ORDER — SERTRALINE 25 MG/1
100 TABLET, FILM COATED ORAL DAILY
Refills: 0 | Status: DISCONTINUED | OUTPATIENT
Start: 2021-10-15 | End: 2021-10-17

## 2021-10-15 RX ORDER — ASPIRIN/CALCIUM CARB/MAGNESIUM 324 MG
325 TABLET ORAL ONCE
Refills: 0 | Status: COMPLETED | OUTPATIENT
Start: 2021-10-15 | End: 2021-10-15

## 2021-10-15 RX ORDER — ATORVASTATIN CALCIUM 80 MG/1
80 TABLET, FILM COATED ORAL AT BEDTIME
Refills: 0 | Status: DISCONTINUED | OUTPATIENT
Start: 2021-10-15 | End: 2021-10-17

## 2021-10-15 RX ORDER — CHOLECALCIFEROL (VITAMIN D3) 125 MCG
1 CAPSULE ORAL
Qty: 0 | Refills: 0 | DISCHARGE

## 2021-10-15 RX ORDER — METOPROLOL TARTRATE 50 MG
50 TABLET ORAL DAILY
Refills: 0 | Status: DISCONTINUED | OUTPATIENT
Start: 2021-10-15 | End: 2021-10-17

## 2021-10-15 RX ORDER — DEXTROSE 50 % IN WATER 50 %
12.5 SYRINGE (ML) INTRAVENOUS ONCE
Refills: 0 | Status: DISCONTINUED | OUTPATIENT
Start: 2021-10-15 | End: 2021-10-17

## 2021-10-15 RX ORDER — ICOSAPENT ETHYL 500 MG/1
2 CAPSULE, LIQUID FILLED ORAL
Qty: 0 | Refills: 0 | DISCHARGE

## 2021-10-15 RX ORDER — INSULIN LISPRO 100/ML
VIAL (ML) SUBCUTANEOUS
Refills: 0 | Status: DISCONTINUED | OUTPATIENT
Start: 2021-10-15 | End: 2021-10-17

## 2021-10-15 RX ORDER — NIACIN 50 MG
1000 TABLET ORAL AT BEDTIME
Refills: 0 | Status: DISCONTINUED | OUTPATIENT
Start: 2021-10-15 | End: 2021-10-17

## 2021-10-15 RX ORDER — LIDOCAINE 4 G/100G
1 CREAM TOPICAL ONCE
Refills: 0 | Status: COMPLETED | OUTPATIENT
Start: 2021-10-15 | End: 2021-10-15

## 2021-10-15 RX ORDER — DEXTROSE 50 % IN WATER 50 %
25 SYRINGE (ML) INTRAVENOUS ONCE
Refills: 0 | Status: DISCONTINUED | OUTPATIENT
Start: 2021-10-15 | End: 2021-10-17

## 2021-10-15 RX ORDER — PIOGLITAZONE HYDROCHLORIDE 15 MG/1
1 TABLET ORAL
Qty: 0 | Refills: 0 | DISCHARGE

## 2021-10-15 RX ORDER — DEXTROSE 50 % IN WATER 50 %
15 SYRINGE (ML) INTRAVENOUS ONCE
Refills: 0 | Status: DISCONTINUED | OUTPATIENT
Start: 2021-10-15 | End: 2021-10-17

## 2021-10-15 RX ORDER — TRAMADOL HYDROCHLORIDE 50 MG/1
50 TABLET ORAL EVERY 6 HOURS
Refills: 0 | Status: DISCONTINUED | OUTPATIENT
Start: 2021-10-15 | End: 2021-10-17

## 2021-10-15 RX ORDER — ENOXAPARIN SODIUM 100 MG/ML
100 INJECTION SUBCUTANEOUS
Refills: 0 | Status: DISCONTINUED | OUTPATIENT
Start: 2021-10-16 | End: 2021-10-17

## 2021-10-15 RX ORDER — SODIUM CHLORIDE 9 MG/ML
1000 INJECTION, SOLUTION INTRAVENOUS
Refills: 0 | Status: DISCONTINUED | OUTPATIENT
Start: 2021-10-15 | End: 2021-10-17

## 2021-10-15 RX ORDER — OXYCODONE AND ACETAMINOPHEN 5; 325 MG/1; MG/1
1 TABLET ORAL ONCE
Refills: 0 | Status: DISCONTINUED | OUTPATIENT
Start: 2021-10-15 | End: 2021-10-15

## 2021-10-15 RX ORDER — ASPIRIN/CALCIUM CARB/MAGNESIUM 324 MG
81 TABLET ORAL DAILY
Refills: 0 | Status: DISCONTINUED | OUTPATIENT
Start: 2021-10-15 | End: 2021-10-17

## 2021-10-15 RX ORDER — GLUCAGON INJECTION, SOLUTION 0.5 MG/.1ML
1 INJECTION, SOLUTION SUBCUTANEOUS ONCE
Refills: 0 | Status: DISCONTINUED | OUTPATIENT
Start: 2021-10-15 | End: 2021-10-17

## 2021-10-15 RX ORDER — ENOXAPARIN SODIUM 100 MG/ML
100 INJECTION SUBCUTANEOUS ONCE
Refills: 0 | Status: COMPLETED | OUTPATIENT
Start: 2021-10-15 | End: 2021-10-15

## 2021-10-15 RX ORDER — CHOLECALCIFEROL (VITAMIN D3) 125 MCG
1000 CAPSULE ORAL
Refills: 0 | Status: DISCONTINUED | OUTPATIENT
Start: 2021-10-15 | End: 2021-10-17

## 2021-10-15 RX ORDER — ACETAMINOPHEN 500 MG
650 TABLET ORAL EVERY 6 HOURS
Refills: 0 | Status: DISCONTINUED | OUTPATIENT
Start: 2021-10-15 | End: 2021-10-17

## 2021-10-15 RX ORDER — METOPROLOL TARTRATE 50 MG
50 TABLET ORAL DAILY
Refills: 0 | Status: DISCONTINUED | OUTPATIENT
Start: 2021-10-15 | End: 2021-10-15

## 2021-10-15 RX ORDER — INSULIN LISPRO 100/ML
VIAL (ML) SUBCUTANEOUS AT BEDTIME
Refills: 0 | Status: DISCONTINUED | OUTPATIENT
Start: 2021-10-15 | End: 2021-10-17

## 2021-10-15 RX ORDER — SODIUM CHLORIDE 9 MG/ML
1000 INJECTION INTRAMUSCULAR; INTRAVENOUS; SUBCUTANEOUS ONCE
Refills: 0 | Status: COMPLETED | OUTPATIENT
Start: 2021-10-15 | End: 2021-10-15

## 2021-10-15 RX ORDER — TRAMADOL HYDROCHLORIDE 50 MG/1
25 TABLET ORAL EVERY 6 HOURS
Refills: 0 | Status: DISCONTINUED | OUTPATIENT
Start: 2021-10-15 | End: 2021-10-17

## 2021-10-15 RX ORDER — LOSARTAN POTASSIUM 100 MG/1
25 TABLET, FILM COATED ORAL DAILY
Refills: 0 | Status: DISCONTINUED | OUTPATIENT
Start: 2021-10-15 | End: 2021-10-17

## 2021-10-15 RX ORDER — TICAGRELOR 90 MG/1
90 TABLET ORAL EVERY 12 HOURS
Refills: 0 | Status: DISCONTINUED | OUTPATIENT
Start: 2021-10-15 | End: 2021-10-17

## 2021-10-15 RX ADMIN — ATORVASTATIN CALCIUM 80 MILLIGRAM(S): 80 TABLET, FILM COATED ORAL at 23:32

## 2021-10-15 RX ADMIN — TRAMADOL HYDROCHLORIDE 50 MILLIGRAM(S): 50 TABLET ORAL at 23:12

## 2021-10-15 RX ADMIN — ENOXAPARIN SODIUM 100 MILLIGRAM(S): 100 INJECTION SUBCUTANEOUS at 18:23

## 2021-10-15 RX ADMIN — SODIUM CHLORIDE 1000 MILLILITER(S): 9 INJECTION INTRAMUSCULAR; INTRAVENOUS; SUBCUTANEOUS at 13:16

## 2021-10-15 RX ADMIN — Medication 0: at 22:56

## 2021-10-15 RX ADMIN — LIDOCAINE 1 PATCH: 4 CREAM TOPICAL at 18:34

## 2021-10-15 RX ADMIN — OXYCODONE AND ACETAMINOPHEN 1 TABLET(S): 5; 325 TABLET ORAL at 18:23

## 2021-10-15 RX ADMIN — Medication 1000 MILLIGRAM(S): at 23:32

## 2021-10-15 RX ADMIN — Medication 325 MILLIGRAM(S): at 13:16

## 2021-10-15 NOTE — ED ADULT TRIAGE NOTE - CHIEF COMPLAINT QUOTE
patient came in ED from home with c/o left chest soreness radiating to the left side of the neck X Wednesday.

## 2021-10-15 NOTE — ED ADULT NURSE NOTE - NSICDXPASTSURGICALHX_GEN_ALL_CORE_FT
PAST SURGICAL HISTORY:  CAD (Coronary Artery Disease) of Artery Bypass Graft 2005 - March 17th    CAD (Coronary Artery Disease), Nonautologous Biological Bypass Graft     CAD S/P percutaneous coronary angioplasty baloon angioplasty Sep 2018    Eye abnormality left eye amblyopia repaired in 1994    Hernia repaired    Hernia, Inguinal     Hypercholesterolemia     Pneumonia due to Organism     S/P CABG x 3 2005  3vCABG 2005 (LIMA to LAD, SVG to OM, left radial to Diag)   NYQ    Status post coronary artery stent placement Cardiac Stent Placed April 30th 2014 @ [facility]  Cardiac Stent X 2 placed January 15th 2017 (piggybacked) @ [facility]

## 2021-10-15 NOTE — H&P ADULT - PROBLEM SELECTOR PLAN 6
Chronic, History of DM2  - Hold home Pioglitazone, Metformin and Jardiance   - Low dose insulin corrective scale  - Hypoglycemia protocol, fingerstick glucose QAC&HS  - F/u AM HbA1c

## 2021-10-15 NOTE — H&P ADULT - PROBLEM SELECTOR PLAN 5
Chronic   - Continue home statin, Vascepa, and Niacin Chronic   - Continue home Rosuvastatin therapeutic interchange to Atorvastatin and Niacin  - Home Vascepa is nonformulary, pt to bring from home

## 2021-10-15 NOTE — ED PROVIDER NOTE - CLINICAL SUMMARY MEDICAL DECISION MAKING FREE TEXT BOX
chest pain 2 days, worse with deep breaths. differentials include but not limited to ACS, PE, chest wall pain, anxiety, pneumonia, gerd. will check lab, EKG, CXR, consider CTA if d-dimer elevated, card consult

## 2021-10-15 NOTE — ED PROVIDER NOTE - NSICDXPASTSURGICALHX_GEN_ALL_CORE_FT
PAST SURGICAL HISTORY:  CAD (Coronary Artery Disease) of Artery Bypass Graft 2005 - March 17th    CAD (Coronary Artery Disease), Nonautologous Biological Bypass Graft     CAD S/P percutaneous coronary angioplasty baloon angioplasty Sep 2018    Eye abnormality left eye amblyopia repaired in 1994    Hernia repaired    Hernia, Inguinal     Hypercholesterolemia     Pneumonia due to Organism     S/P CABG x 3 2005  3vCABG 2005 (LIMA to LAD, SVG to OM, left radial to Diag)   NYQ    Status post coronary artery stent placement Cardiac Stent Placed April 30th 2014 @ [facility]  Cardiac Stent X 2 placed January 15th 2017 (piggybacked) @ [facility]

## 2021-10-15 NOTE — H&P ADULT - NSICDXPASTSURGICALHX_GEN_ALL_CORE_FT
PAST SURGICAL HISTORY:  CAD (Coronary Artery Disease) of Artery Bypass Graft 2005 - March 17th    CAD (Coronary Artery Disease), Nonautologous Biological Bypass Graft     CAD S/P percutaneous coronary angioplasty baloon angioplasty Sep 2018    Eye abnormality left eye amblyopia repaired in 1994    Hernia repaired    Hernia, Inguinal     Hypercholesterolemia     Pneumonia due to Organism     S/P CABG x 3 2005  3vCABG 2005 (LIMA to LAD, SVG to OM, left radial to Diag)   NYQ    Status post coronary artery stent placement Cardiac Stent Placed April 30th 2014 @ [facility]  Cardiac Stent X 2 placed January 15th 2017 (piggybacked) @ [facility]

## 2021-10-15 NOTE — H&P ADULT - HISTORY OF PRESENT ILLNESS
60 yo M with PMH CAD (2 stents 2014, 2017, CABGx3 2005, Cath/PCI [place] balloon 2018), chronic back pain, arthritis, pre-diabetes, HLD, and HTN presents to the ED with left sided chest pain x2 days. Pain radiated to the left shoulder/neck. Pt states that chest pain is worse when he takes deep breaths, denies chest pain at rest. Pt admits to slight SOB on exertion such as going up the stairs. Pt also had a mild dry cough. Pt was diaphoretic last night along with chills, and had trouble getting to sleep. Of note, pt has a history of PNA years ago and states that symptoms feel similar to that incident. Pt went to urgent care this morning to have a chest xray to evaluate for PNA and was then sent to the ED to evaluate for a cardiac etiology. Of note, pt was helping clean her mother's house and was moving boxes around.  Pt denies any history of PE or DVT.     Denies fever, chills, chest pain, palpitations, SOB, cough, abdominal pain, nausea, vomiting, diarrhea, constipation, urinary frequency, urgency, or dysuria, headaches, changes in vision, dizziness, numbness, tingling.  Denies recent travel, recent antibiotic use, or sick contacts.  Pt is not vaccinated for COVID.     ED Course:   Vitals: BP: 130/82, HR: 112, Temp: 98.7 F, RR: 16, SpO2: 98% on RA   Labs: WBC: 11.45, neut: 9.61, gluc: 127, CK: 419, trop: .234, Ddimer <150  COVID negative   CT Chest: No acute chest pathology,  left basilar streaky atelectasis. Coronary artery calcifications are present. Status post CABG  EKG:  Sinus tachycardia with occasional premature ventricular complexes, HR: 116  Received Aspirin 325 mg x1, 1L NS bolus x1 in the ED     60 yo M with PMH CAD (2 stents 2014, 2017, CABGx3 2005, Cath/PCI Poba balloon 2018), chronic back pain, arthritis, pre-diabetes, anxiety, HLD, and HTN presents to the ED with left sided chest pain since Wednesday. Chest pain only occurs when he takes deep breaths, pt denies chest pain at rest. Pain is intermittent, "sore", and radiates to the left neck during deep breaths. Denies any numbness or tingling. Pt admits to slight SOB on exertion such as going up the stairs. He also states that he has a mild dry cough. Pt took Ibuprofen yesterday which helped symptoms. Pt was diaphoretic last night along with chills, and had trouble getting to sleep. Of note, pt has a history of PNA years ago and states that symptoms feel similar to that incident except that he had pain instead of chest pain. Pt went to urgent care this morning to have a chest xray to evaluate for PNA and was then sent to the ED to evaluate for a cardiac etiology given his extensive cardiac history. Of note, pt was helping clean his mother's house who recently passed away from a stroke and was moving boxes around. Pt denies any history of PE or DVT, denies any leg swelling. Pt took all his morning medications.  Denies palpitations, abdominal pain, nausea, vomiting, diarrhea, constipation, urinary frequency, urgency, or dysuria, headaches, changes in vision, dizziness.   Denies recent travel, recent antibiotic use, or sick contacts.  Pt is not vaccinated for COVID.     ED Course:   Vitals: BP: 130/82, HR: 112, Temp: 98.7 F, RR: 16, SpO2: 98% on RA   Labs: WBC: 11.45, neut: 9.61, gluc: 127, CK: 419, trop: .234, Ddimer <150  COVID negative   CT Chest: No acute chest pathology,  left basilar streaky atelectasis. Coronary artery calcifications are present. Status post CABG  CXR: left lower lung infiltrate as per personal read compared to previous CXR, pending official read   EKG:  Sinus tachycardia with occasional premature ventricular complexes, HR: 116  Received Aspirin 325 mg, Lovenox 100 mg, Percocet, lidocaine patch and 1L NS bolus x1 in the ED     62 yo M with PMH CAD (2 stents 2014, 2017, CABGx3 2005, Cath/PCI Poba balloon 2018), chronic back pain, arthritis, pre-diabetes, anxiety, HLD, and HTN presents to the ED with left sided chest pain since Wednesday. Chest pain only occurs when he takes deep breaths, pt denies chest pain at rest. Pain is intermittent, "sore", and radiates to the left neck during deep breaths. Denies any numbness or tingling. Pt admits to slight SOB on exertion such as going up the stairs. He also states that he has a mild dry cough. Pt took Ibuprofen yesterday which helped symptoms. Pt was diaphoretic last night along with chills, and had trouble getting to sleep. Of note, pt has a history of PNA years ago and states that symptoms feel similar to that incident except that he had pain instead of chest pain. Pt went to urgent care this morning to have a chest xray to evaluate for PNA and was then sent to the ED to evaluate for a cardiac etiology given his extensive cardiac history. Of note, pt was helping clean his mother's house who recently passed away from a stroke and was moving boxes around. Pt denies any history of PE or DVT, denies any leg swelling. Pt took all his morning medications.  Denies palpitations, abdominal pain, nausea, vomiting, diarrhea, constipation, urinary frequency, urgency, or dysuria, headaches, changes in vision, dizziness.   Denies recent travel, recent antibiotic use, or sick contacts.  Pt is not vaccinated for COVID.     ED Course:   Vitals: BP: 130/82, HR: 112, Temp: 98.7 F, RR: 16, SpO2: 98% on RA   Labs: WBC: 11.45, neut: 9.61, gluc: 127, CK: 419, trop: .234, Ddimer <150  COVID negative   CT Chest: No acute chest pathology,  left basilar streaky atelectasis. Coronary artery calcifications are present. Status post CABG  CXR: no acute lung pathology as per personal read compared to previous CXR, pending official read   EKG:  Sinus tachycardia with occasional premature ventricular complexes, HR: 116  Received Aspirin 325 mg, Lovenox 100 mg, Percocet, lidocaine patch and 1L NS bolus x1 in the ED

## 2021-10-15 NOTE — H&P ADULT - PROBLEM SELECTOR PLAN 8
- S/p FD Lovenox 100 mg x1 in the ED, will continue FD Lovenox until troponin downtrends - S/p FD Lovenox 100 mg x1 in the ED, will continue FD Lovenox until troponin downtrends  - Pt qualifies for Lovenox 100 mg BID as per pharmacy since pt's creatinine clearance is appropriate

## 2021-10-15 NOTE — CONSULT NOTE ADULT - SUBJECTIVE AND OBJECTIVE BOX
Patient is a 61y old  Male who presents with a chief complaint of     HPI:      PAST MEDICAL & SURGICAL HISTORY:  S/P CABG    CAD (coronary artery disease)  CABG x3    HTN (hypertension)    HLD (hyperlipidemia)    Complex tear of medial meniscus, current injury, left knee, initial encounter    Prediabetes    Arthritis    Back pain    Stented coronary artery  promus premier stent pSVG to OM  and promus premier stent x2 SVG to OM     CAD (Coronary Artery Disease), Nonautologous Biological Bypass Graft    CAD (Coronary Artery Disease) of Artery Bypass Graft   -     Pneumonia due to Organism    Hernia, Inguinal    Hypercholesterolemia    S/P CABG x 3    3vCABG  (LIMA to LAD, SVG to OM, left radial to Diag)   NYHQ    Hernia  repaired    Eye abnormality  left eye amblyopia repaired in     Status post coronary artery stent placement  Cardiac Stent Placed 2014 @ [facility]  Cardiac Stent X 2 placed 2017 (piggybacked) @ [facility]    CAD S/P percutaneous coronary angioplasty  baloon angioplasty Sep 2018        ECHO FINDINGS:    MEDICATIONS  (STANDING):  sodium chloride 0.9% Bolus 1000 milliLiter(s) IV Bolus once    MEDICATIONS  (PRN):      FAMILY HISTORY:  Hypertension  Mother - Alive age 86    Family history of coronary artery disease in father  Father -  age 86    Family history of prostate cancer in father  Father -  age 86    FHx: stroke  Mother. 86Y    Denies Family history of CAD or early MI    Constitutional: denies fever, chills  HEENT: denies blurry vision, difficulty hearing  Respiratory: denies SOB, CHOI, cough  Cardiovascular: denies CP, palpitations, orthopnea, PND, LE edema  Gastrointestinal: denies nausea, vomiting, abdominal pain  Genitourinary: denies urinary changes  Skin: Denies rashes, itching  Neurologic: denies headache, weakness, dizziness  Hematology/Oncology: denies bleeding, easy bruising    SOCIAL HISTORY: No tobacco, Alcohol or Drug use    Vital Signs Last 24 Hrs  T(C): 37.1 (15 Oct 2021 11:04), Max: 37.1 (15 Oct 2021 11:04)  T(F): 98.7 (15 Oct 2021 11:04), Max: 98.7 (15 Oct 2021 11:04)  HR: 112 (15 Oct 2021 11:04) (112 - 112)  BP: 130/82 (15 Oct 2021 11:04) (130/82 - 130/82)  BP(mean): --  RR: 16 (15 Oct 2021 11:04) (16 - 16)  SpO2: 98% (15 Oct 2021 11:04) (98% - 98%)    Physical Exam:  General: Well developed, well nourished, NAD  HEENT: NCAT, EOMI bl, moist mucous membranes   Neck: Supple, nontender, no mass  Neurology: A&Ox3, nonfocal, sensation intact   Respiratory: CTA B/L, No W/R/R  CV: RRR, +S1/S2, no murmurs, rubs or gallops  Abdominal: Soft, NT, ND +BSx4, no palpable masses  Extremities: No C/C/E, + peripheral pulses  MSK: Normal ROM, no joint erythema or warmth, no joint swelling   Heme: No obvious ecchymosis or petechiae   Skin: warm, dry, normal color    ECG:    I&O's Detail      LABS:                        14.2   11.45 )-----------( 206      ( 15 Oct 2021 11:51 )             42.5                   I&O's Summary    BNP  RADIOLOGY & ADDITIONAL STUDIES: Patient is a 61y old Male who presents with a chief complaint of chest pain.    HPI: Pt is a 61 year old male pmhx CAD (2 stents , 2017, CABGx3 , Cath/PCI Poba balloon 2018), chronic back pain, arthritis, pre-diabetes, HLD, and HTN presents with left sided, sharp chest pain when he takes a deep breath that started 2 days ago. Pt rates it 6/10 with deep breath, 0/10 at rest. Pt reports mild dry cough. Pt denies pain with exertion. Pt is not short of breath, but reports CHOI. Pain radiates up to left neck. No known fevers (temp 98.7 at home this morning), but reports night sweats and chills last night and difficult time getting comfortable while sleeping. Pt has history of pneumonia a couple years ago and states symptoms similar quality and character. Pt states this pain is not similar to cardiac pain he has had in the past. Pt reports being stressed/anxious from mother's recent death. Pt also reports moving boxes and could have pulled a muscle, and cleaning out mother's house/shed and has been around a lot of dust. Pt went to urgent care this am to have a CXR to eval for pneumonia and sent to ED due to cardiac history of further eval. No history of PE or DVT, no history of cancer, no recent travels or surgeries. Pt not vaccinated for COVID-19. Pain is not reproducible on exam. Pt denies palpitations, SOB, fevers, abd pain, dysuria, muscle pain/weakness, n/v/d/c. PCP Luciano Arias, Cardiologist Jameel Medina.      PAST MEDICAL & SURGICAL HISTORY:  S/P CABG    CAD (coronary artery disease)  CABG x3    HTN (hypertension)    HLD (hyperlipidemia)    Complex tear of medial meniscus, current injury, left knee, initial encounter    Prediabetes    Arthritis    Back pain    Stented coronary artery  promus premier stent pSVG to OM  and promus premier stent x2 SVG to OM     CAD (Coronary Artery Disease), Nonautologous Biological Bypass Graft    CAD (Coronary Artery Disease) of Artery Bypass Graft   -     Pneumonia due to Organism    Hernia, Inguinal    Hypercholesterolemia    S/P CABG x 3    3vCABG  (LIMA to LAD, SVG to OM, left radial to Diag)   NYHQ    Hernia  repaired    Eye abnormality  left eye amblyopia repaired in     Status post coronary artery stent placement  Cardiac Stent Placed 2014 @ [facility]  Cardiac Stent X 2 placed 2017 (piggybacked) @ [facility]    CAD S/P percutaneous coronary angioplasty  baloon angioplasty Sep 2018        ECHO FINDINGS:    MEDICATIONS  (STANDING):  sodium chloride 0.9% Bolus 1000 milliLiter(s) IV Bolus once    MEDICATIONS  (PRN):      FAMILY HISTORY:  Hypertension  Mother - Alive age 86    Family history of coronary artery disease in father  Father -  age 86    Family history of prostate cancer in father  Father -  age 86    FHx: stroke  Mother. 86Y    Denies Family history of CAD or early MI    Constitutional: +chills, denies fever  HEENT: denies blurry vision, difficulty hearing  Respiratory: +cough, +CHOI, denies SOB  Cardiovascular: +CP, denies palpitations, orthopnea, PND, LE edema  Gastrointestinal: denies nausea, vomiting, abdominal pain  Genitourinary: denies urinary changes  Skin: Denies rashes, itching  Neurologic: denies headache, weakness, dizziness  Hematology/Oncology: denies bleeding, easy bruising    SOCIAL HISTORY:  "A few hundred cigarettes when I was 22"   Rare alcohol use, intermittent marijuana use       Vital Signs Last 24 Hrs  T(C): 37.1 (15 Oct 2021 11:04), Max: 37.1 (15 Oct 2021 11:04)  T(F): 98.7 (15 Oct 2021 11:04), Max: 98.7 (15 Oct 2021 11:04)  HR: 112 (15 Oct 2021 11:04) (112 - 112)  BP: 130/82 (15 Oct 2021 11:04) (130/82 - 130/82)  BP(mean): --  RR: 16 (15 Oct 2021 11:04) (16 - 16)  SpO2: 98% (15 Oct 2021 11:04) (98% - 98%)    Physical Exam:  General: NAD  HEENT: L ptosis, 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), NCAT, moist mucous membranes   Neck: Supple, nontender, no mass  Neurology: A&Ox3, nonfocal, sensation intact   Respiratory: CTA B/L, No W/R/R  CV: RRR, +S1/S2, no murmurs, rubs or gallops  Abdominal: Soft, NT, ND +BSx4, no palpable masses  Extremities: No C/C/E, + peripheral pulses  MSK: Normal ROM, no joint erythema or warmth, no joint swelling   Heme: No obvious ecchymosis or petechiae   Skin: warm, dry, normal color    ECG: Sinus tach w PVCs,     I&O's Detail      LABS:                        14.2   11.45 )-----------( 206      ( 15 Oct 2021 11:51 )             42.5                   I&O's Summary    BNP  RADIOLOGY & ADDITIONAL STUDIES:

## 2021-10-15 NOTE — H&P ADULT - NSHPSOCIALHISTORY_GEN_ALL_CORE
Tobacco:   EtOH:   Recreational drug use:  Lives with:  Ambulates:  ADLs:  Occupation:  Vaccinations: Tobacco: Denies  EtOH: Occasional 1-2 beers/week   Recreational drug use: Denies, tried medical marijuana in the past  Lives with: Wife and kids  Ambulates: on own   ADLs: on own   Vaccinations: Did not get COVID of flu vaccine

## 2021-10-15 NOTE — H&P ADULT - PROBLEM SELECTOR PLAN 3
Chronic  - Stable  - Continue with home medication Irbesartan w/ hold parameters  - Home medication Metoprolol 25 mg qd was increased to Metoprolol 50mg qd with hold parameters as per Cardio recs  - Monitor BP & titrate BP meds PRN Chronic  - Stable  - Continue with home medication Irbesartan therapeutic interchange to Losartan w/ hold parameters  - Home medication Metoprolol 25 mg qd was increased to Metoprolol 50mg qd with hold parameters as per Cardio recs  - Monitor BP & titrate BP meds PRN

## 2021-10-15 NOTE — H&P ADULT - NSHPPHYSICALEXAM_GEN_ALL_CORE
T(C): 37.1 (10-15-21 @ 11:04), Max: 37.1 (10-15-21 @ 11:04)  HR: 112 (10-15-21 @ 11:04) (112 - 112)  BP: 130/82 (10-15-21 @ 11:04) (130/82 - 130/82)  RR: 16 (10-15-21 @ 11:04) (16 - 16)  SpO2: 98% (10-15-21 @ 11:04) (98% - 98%)    GENERAL: patient appears well, no acute distress, appropriately interactive  EYES: sclera clear, no exudates  ENMT: oropharynx clear without erythema, no exudates, moist mucous membranes  NECK: supple, soft  LUNGS: good air entry bilaterally, clear to auscultation, symmetric breath sounds, no wheezing or rhonchi appreciated  HEART: soft S1/S2, regular rate and rhythm, no murmurs noted, no lower extremity edema  GASTROINTESTINAL: abdomen is soft, nontender, nondistended, normoactive bowel sounds  INTEGUMENT: good skin turgor, warm skin, appears well perfused  MUSCULOSKELETAL: no clubbing or cyanosis, no obvious deformity  NEUROLOGIC: awake, alert, oriented x3, good muscle tone in all 4 extremities  HEME/LYMPH: no obvious ecchymosis or petechiae T(C): 37.1 (10-15-21 @ 11:04), Max: 37.1 (10-15-21 @ 11:04)  HR: 112 (10-15-21 @ 11:04) (112 - 112)  BP: 130/82 (10-15-21 @ 11:04) (130/82 - 130/82)  RR: 16 (10-15-21 @ 11:04) (16 - 16)  SpO2: 98% (10-15-21 @ 11:04) (98% - 98%)    GENERAL: patient appears well, no acute distress, appropriately interactive  EYES: sclera clear, no exudates  ENMT: oropharynx clear without erythema, no exudates, moist mucous membranes  NECK: supple, soft  LUNGS: good air entry bilaterally, clear to auscultation, symmetric breath sounds, no wheezing or rhonchi appreciated, chest wall nontender to palpation, no accessory muscle use   HEART: soft S1/S2, tachycardic, regular rhythm, no murmurs noted, no lower extremity edema  GASTROINTESTINAL: abdomen is soft, nontender, nondistended, normoactive bowel sounds  INTEGUMENT: good skin turgor, warm skin, appears well perfused  MUSCULOSKELETAL: no clubbing or cyanosis, no obvious deformity  NEUROLOGIC: awake, alert, oriented x3, good muscle tone in all 4 extremities, 5/5 strength BL upper and lower extremities   HEME/LYMPH: no obvious ecchymosis or petechiae

## 2021-10-15 NOTE — ED ADULT NURSE NOTE - OBJECTIVE STATEMENT
Pt received in bed alert and oriented and resting in bed the with c/o chest discomfort pt describes it as he has pneumonia in his lungs. As per Md's orders IV conrad placed blood specimen obtained and sent to the lab. Pt stable and nursing care ongoing and safety.

## 2021-10-15 NOTE — CONSULT NOTE ADULT - ATTENDING COMMENTS
He presents now with pleuritic chest pain which is nonanginal in nature.  It seems to be reproducible with deep breathing.  His EKG is nonischemic and unchanged.  However his troponins are mildly elevated.  I think this is a function of having some level of demand ischemia in the setting of underlying pain and tachycardia and residual CAD.  he may have an underlying pneumonia which needs to be clearly worked out.  Monitor on telemetry.  Would trend a full set of cardiac enzymes.  Would give Lovenox full dose until cardiac enzymes downtrending.  Would continue with his aspirin Brilinta therapy.  Increase his beta-blocker as tolerated.  Pain control.  Further cardiac workup will depend on clinical course.

## 2021-10-15 NOTE — H&P ADULT - ATTENDING COMMENTS
62 yo M with PMH CAD (2 stents 2014, 2017, CABGx3 2005, Cath/PCI Poba balloon 2018), chronic back pain, arthritis, pre-diabetes, anxiety, HLD, and HTN presents to the ED with left sided chest pain since Wednesday. Admitted for chest pain and elevated troponin.     HPI as above.     T(C): 37.3 (10-15-21 @ 18:09), Max: 37.3 (10-15-21 @ 18:09)  HR: 100 (10-15-21 @ 18:09) (100 - 112)  BP: 130/80 (10-15-21 @ 18:09) (130/80 - 130/82)  RR: 17 (10-15-21 @ 18:09) (16 - 17)  SpO2: 97% (10-15-21 @ 18:09) (97% - 98%)  Wt(kg): --    Physical Exam:   GENERAL: well-groomed, well-developed, NAD  HEENT: head NC/AT; EOM intact, conjunctiva & sclera clear; hearing grossly intact, moist mucous membranes  NECK: supple, no JVD  RESPIRATORY: CTA B/L, no wheezing, rales, rhonchi or rubs  CARDIOVASCULAR: S1&S2, RRR, no murmurs or gallops. tenderness to deep palpation of L chest wall  ABDOMEN: soft, non-tender, non-distended, + Bowel sounds x4 quadrants, no guarding, rebound or rigidity  MUSCULOSKELETAL:  no clubbing, cyanosis or edema of all 4 extremities  LYMPH: no cervical lymphadenopathy  VASCULAR: Radial pulses 2+ bilaterally, no varicose veins   SKIN: warm and dry, color normal  NEUROLOGIC: AA&O X3, CN2-12 intact w/ no focal deficits, no sensory loss, motor Strength 5/5 in UE & LE B/L  Psych: Normal mood and affect, normal behavior    Plan:  NSTEMI likely type 2 due to demand ischemia: trend cardiac enzymes. s/p lovenox [id number]. May need additional lovenox.   -check TTE.   -toprol dose increased.   -leukocytosis is likely reactive. Do not suspect PNA at this time or any other infectious etiology.   -remainder as above.

## 2021-10-15 NOTE — CONSULT NOTE ADULT - ASSESSMENT
*****CHARTING IN PROGRESS*******         - Patient is not complaining of any cardiac symptoms at this time.  - No clear evidence of acute ischemia, trops negative x 2. Will follow up third set.    - No acute changes on EKG compared to previous.  - No meaningful evidence of volume overload.  - Previous TTE shows ___.  - BP well controlled, monitor routine hemodynamics.    - Monitor and replete lytes, keep K>4, Mg>2.  - Strict I/Os, daily weights.    - His CKs are flat, suggesting against acute atherosclerotic plaque rupture.  - Biomarker trend is not consistent with plaque rupture but rather demand ischemia. Monitor closely for the development of anginal symptoms or clinical signs of ischemia.     - Other cardiovascular workup will depend on clinical course.  - All other workup per primary team.  - Will continue to follow.               *****CHARTING IN PROGRESS*******      Pt is a 61 year old male pmhx CAD (2 stents 2014, 2017, CABGx3 2005, Cath/PCI Poba balloon 2018), chronic back pain, arthritis, pre-diabetes, HLD, and HTN presents with left sided, sharp chest pain when he takes a deep breath that started 2 days ago.    #Chest pain  - Patient is complaining of sharp pleuritic chest pain, denies pain at rest or with exertion  - Trop elevated - 0.234. Will follow up second set.  - EKG - sinus tach with PVCs, no acute ischemic changes compared to previous.  - No meaningful evidence of volume overload.  - Previous TTE (8/28/18) shows LVEF 67%, normal size and function.  - Previous exercise stress test (8/28/19) - No CP, no ekg abnormalities  - D dimer negative, PE unlikely   - CXR - L lower lobe infiltrated compared to previous, consider pulm pathology    - BP well controlled, monitor routine hemodynamics.  - Monitor and replete lytes, keep K>4, Mg>2.  - Strict I/Os, daily weights.    - Other cardiovascular workup will depend on clinical course.  - All other workup per primary team.  - Will continue to follow.  Pt is a 61 year old male pmhx CAD (2 stents 2014, 2017, CABGx3 2005, Cath/PCI Poba balloon 2018), chronic back pain, arthritis, pre-diabetes, HLD, and HTN presents with left sided, sharp chest pain when he takes a deep breath that started 2 days ago.    #Chest pain  - Patient is complaining of sharp pleuritic chest pain, denies pain at rest or with exertion  - EKG - sinus tach with PVCs, no acute ischemic changes compared to previous.  - No meaningful evidence of volume overload.  - Previous TTE (8/28/18) shows LVEF 67%, normal size and function.  - Previous exercise stress test (8/28/19) - No CP, no ekg abnormalities  - D dimer negative, PE unlikely   - CXR - L lower lobe infiltrated compared to previous  - Chest CT - No acute pathology    - Trop elevated - 0.234. Will follow up second set and trend  - CKMB  - Continue ASA, Brilinta and other home meds  - Started metoprolol 50mg qd.  - Admit to tele    #HTN  - BP well controlled, monitor routine hemodynamics.  - Started metoprolol, continue home meds  - Monitor and replete lytes, keep K>4, Mg>2.  - Strict I/Os, daily weights.    #DVT prophalaxis  - Full dose lovenox started, stat dose given now    - Other cardiovascular workup will depend on clinical course.  - All other workup per primary team.  - Will continue to follow.  Pt is a 61 year old male pmhx CAD (2 stents 2014, 2017, CABGx3 2005, Cath/PCI Poba balloon 2018), chronic back pain, arthritis, pre-diabetes, HLD, and HTN presents with left sided, sharp chest pain when he takes a deep breath that started 2 days ago.    #Chest pain  - Patient is complaining of sharp pleuritic chest pain, denies pain at rest or with exertion  - EKG - sinus tach with PVCs, no acute ischemic changes compared to previous.  - No meaningful evidence of volume overload.  - Previous TTE (8/28/18) shows LVEF 67%, normal size and function.  - Previous exercise stress test (8/28/19) - No CP, no ekg abnormalities  - D dimer negative, PE unlikely   - CXR - L lower lobe infiltrated compared to previous  - Chest CT - No acute pathology        - Trop elevated - 0.234. Will follow up second set and trend  - Continue ASA, Brilinta and other home meds  - Increased home metoprolol to 50mg qd with hold parameteres   - Admit to tele    #HTN  - BP well controlled, monitor routine hemodynamics.  - Started metoprolol, continue home meds  - Monitor and replete lytes, keep K>4, Mg>2.  - Strict I/Os, daily weights.    #DVT prophalaxis  - Full dose lovenox started, stat dose given now    - Other cardiovascular workup will depend on clinical course.  - All other workup per primary team.  - Will continue to follow.  Pt is a 61 year old male pmhx CAD (2 stents 2014, 2017, CABGx3 2005, Cath/PCI Poba balloon 2018), chronic back pain, arthritis, pre-diabetes, HLD, and HTN presents with left sided, sharp chest pain when he takes a deep breath that started 2 days ago.    #Chest pain  - Patient is complaining of sharp pleuritic chest pain, denies pain at rest or with exertion  - ?PNA given leukocytosis  - EKG - sinus tach with PVCs, no acute ischemic changes compared to previous.  - No meaningful evidence of volume overload.  - Previous TTE (8/28/18) shows LVEF 67%, normal size and function.  - Previous exercise stress test (8/28/19) - No CP, no ekg abnormalities  - D dimer negative, PE unlikely   - CXR - L lower lobe infiltrated compared to previous  - Chest CT - No acute pathology      - Trop elevated - 0.234. Will follow up second set and trend. Possible demand ischemia.   - Continue ASA, Brilinta    - Full dose lovenox until Greg downtrending.   - Increased home metoprolol to 50mg qd with hold parameteres   - Admit to tele    #HTN  - BP well controlled, monitor routine hemodynamics.  - Started metoprolol, continue home meds  - Monitor and replete lytes, keep K>4, Mg>2.  - Strict I/Os, daily weights.      - Other cardiovascular workup will depend on clinical course.  - All other workup per primary team.  - Will continue to follow.  Pt is a 61 year old male pmhx CAD (2 stents 2014, 2017, CABGx3 2005, Cath/PCI Poba balloon 2018), chronic back pain, arthritis, pre-diabetes, HLD, and HTN presents with left sided, sharp chest pain when he takes a deep breath that started 2 days ago.    #Chest pain  - Patient is complaining of sharp pleuritic chest pain, denies pain at rest or with exertion  - EKG - sinus tach with PVCs, no acute ischemic changes compared to previous.  - No meaningful evidence of volume overload.  - Previous TTE (8/28/18) shows LVEF 67%, normal size and function.  - Previous exercise stress test (8/28/19) - No CP, no ekg abnormalities  - D dimer negative, PE unlikely   - CXR - L lower lobe infiltrated compared to previous  - Chest CT - No acute pathology  - Trop elevated - 0.234. Will follow up second set and trend to peak   - Continue ASA, Brilinta and other home meds  - Increased home metoprolol to 50mg qd with hold parameters   - Admit to tele for further monitoring   - Monitor and replete lytes, keep K>4, Mg>2.  - Strict I/Os, daily weights.    #HTN  - BP well controlled, monitor routine hemodynamics.  - Increased home metoprolol to 50mg qd with hold parameters     CAD   - S/p 2 stents 2014, 2017, CABGx3 2005, Cath/PCI Poba balloon 2018  - As per above  - Continue home statin    HLD   - As per above    #DVT prophylaxis  - Full dose lovenox started, stat dose given now    - Other cardiovascular workup will depend on clinical course.  - All other workup per primary team.  - Will continue to follow.

## 2021-10-15 NOTE — H&P ADULT - NSHPREVIEWOFSYSTEMS_GEN_ALL_CORE
CONSTITUTIONAL: denies fever, admits chills and sweats, denies fatigue, weakness  HEENT: denies blurred vision, sore throat  SKIN: denies new lesions, rash  CARDIOVASCULAR:  admits chest pain, denies chest pressure, palpitations  RESPIRATORY: admits SOAE and cough, denies sputum production  GASTROINTESTINAL: denies nausea, vomiting, diarrhea, abdominal pain, melena or hematochezia   GENITOURINARY: denies dysuria, discharge  NEUROLOGICAL: denies numbness, headache, focal weakness  MUSCULOSKELETAL: denies new joint pain, muscle aches  HEMATOLOGIC: denies gross bleeding, bruising CONSTITUTIONAL: admits chills and sweats, denies fatigue, weakness  HEENT: denies blurred vision, sore throat, admits neck pain   SKIN: denies new lesions, rash  CARDIOVASCULAR:  admits chest pain, denies chest pressure, palpitations  RESPIRATORY: admits CHOI and cough, denies sputum production  GASTROINTESTINAL: denies nausea, vomiting, diarrhea, abdominal pain, melena or hematochezia   GENITOURINARY: denies dysuria, discharge  NEUROLOGICAL: denies numbness, headache, focal weakness  MUSCULOSKELETAL: denies new joint pain, muscle aches  HEMATOLOGIC: denies gross bleeding, bruising

## 2021-10-15 NOTE — ED PROVIDER NOTE - OBJECTIVE STATEMENT
61 year old male with history of CAD (stents and CABG), chronic back pain, arthritis, pre-diabetes, HLD, and HTN presents with left sided chest pain that started 2 days ago when taking a deep breath. not short of breath, but pain present when he takes a deep breath. pain radiates up to left shoulder. no chest pain at rest. slight shortness of breath when walking up the stairs this am. no known fevers, but reports felt sweaty last night and diff time getting comfortable last night while sleeping. history of pneumonia a couple years ago and states symptoms similar quality and character. also reports cleaning out mother's house and has been around a lot of dust, unsure if related. went to urgent care this am to have a CXR to eval for pneumonia and sent to ED due to cardiac history of further eval. denies cough. no history of PE or DVT. no history of cancer. no recent travels or surgeries   PCP Luciano Medina

## 2021-10-15 NOTE — H&P ADULT - PROBLEM SELECTOR PLAN 1
Patient is complaining of left sided pleuritic chest pain radiating to the left neck, improving with ibuprofen   Most likely 2/2 pericarditis vs PNA   - s/p Aspirin 325 mg, Percocet, lidocaine patch in the ED    - EKG: Sinus tachycardia with occasional premature ventricular complexes, HR: 116 no acute ischemic changes compared to previous EKG   - Previous exercise stress test (8/28/19) was negative   - CK: 419, trop: .234; most likely demand ischemia, will trend till peak   - WBC: 11.45, neut: 9.6; monitor fever and leukocytosis   - CXR: left lower lung infiltrate as per personal read compared to previous CXR, pending official read   - CT Chest: No acute chest pathology,  left basilar streaky atelectasis. Coronary artery calcifications are present. Status post CABG  - D dimer negative; unlikely PE   - Pain regimen: Percocet for   - Cardio Homa group following, f/u recs Patient is complaining of left sided pleuritic chest pain radiating to the left neck, improving with ibuprofen   Most likely 2/2 pericarditis vs PNA   - s/p Aspirin 325 mg, Percocet, lidocaine patch in the ED    - EKG: Sinus tachycardia with occasional premature ventricular complexes, HR: 116 no acute ischemic changes compared to previous EKG   - Previous exercise stress test (8/28/19) was negative   - CK: 419, trop: .234; most likely demand ischemia, will trend till peak   - WBC: 11.45, neut: 9.6; monitor fever and leukocytosis   - CXR: left lower lung infiltrate as per personal read compared to previous CXR, pending official read   - CT Chest: No acute chest pathology,  left basilar streaky atelectasis. Coronary artery calcifications are present. Status post CABG  - D dimer negative; unlikely PE   - Pain regimen: Tylenol for mild,  Tramadol 25 mg for moderate and Tramadol 50 mg for severe   - Lidocaine patches prn  - Cardio Homa group following, f/u recs Patient is complaining of left sided pleuritic chest pain radiating to the left neck, improving with ibuprofen   Most likely 2/2 pericarditis vs PNA   - s/p Aspirin 325 mg, Percocet, lidocaine patch in the ED    - EKG: Sinus tachycardia with occasional premature ventricular complexes, HR: 116 no acute ischemic changes compared to previous EKG   - Previous exercise stress test (8/28/19) was negative   - CK: 419, trop: .234; most likely demand ischemia, will trend till peak   - WBC: 11.45, neut: 9.6; monitor fever and leukocytosis   - CXR: no acute lung pathology as per personal read compared to previous CXR, pending official read   - CT Chest: No acute chest pathology,  left basilar streaky atelectasis. Coronary artery calcifications are present. Status post CABG; unlikely PNA   - D dimer negative; unlikely PE   - Pain regimen: Tylenol for mild,  Tramadol 25 mg for moderate and Tramadol 50 mg for severe   - Lidocaine patches prn  - Cardio Homa group following, f/u recs Patient is complaining of left sided pleuritic chest pain radiating to the left neck, improving with ibuprofen   Most likely 2/2 demand ischemia  - s/p Aspirin 325 mg, Percocet, lidocaine patch in the ED    - EKG: Sinus tachycardia with occasional premature ventricular complexes, HR: 116 no acute ischemic changes compared to previous EKG   - Previous exercise stress test (8/28/19) was negative   - CK: 419, trop: .234; most likely demand ischemia, will trend till peak   - WBC: 11.45, neut: 9.6; monitor fever and leukocytosis   - CXR: no acute lung pathology as per personal read compared to previous CXR, pending official read   - CT Chest: No acute chest pathology,  left basilar streaky atelectasis. Coronary artery calcifications are present. Status post CABG; unlikely PNA   - D dimer negative; unlikely PE   - Pain regimen: Tylenol for mild,  Tramadol 25 mg for moderate and Tramadol 50 mg for severe   - Lidocaine patches prn  -check TTE  - Cardio Homa group following, f/u recs

## 2021-10-15 NOTE — H&P ADULT - PROBLEM SELECTOR PLAN 4
S/p 2 stents 2014, 2017, CABGx3 2005, Cath/PCI Poba balloon 2018  - Continue home Brilinta and Aspirin  - Continue home statin S/p 2 stents 2014, 2017, CABGx3 2005, Cath/PCI Poba balloon 2018  - Continue home Brilinta and Aspirin  - Continue home Rosuvastatin therapeutic interchange to Atorvastatin

## 2021-10-15 NOTE — ED PROVIDER NOTE - PROGRESS NOTE DETAILS
spoke with Dr. Medina (cards), will see patient in ED CT chest shows atelectasis, no infiltrate. no current chest pain or shortness of breath. spoke with Dr. Medina. will admit for observation and trend troponin. spoke with Dr. Naseem Cameron, kindly accepts patient for admission

## 2021-10-15 NOTE — ED PROVIDER NOTE - ATTENDING CONTRIBUTION TO CARE
I have personally performed a face to face diagnostic evaluation on this patient.  I have reviewed the PA note and agree with the history, exam, and plan of care, except as noted.  History and Exam by me shows left sided chest pain that started 2 days ago when taking a deep breath. not short of breath, .  Patient is NAD.  A n O x 3. Lungs cta bl, heart s1s2, rrr, no murmurs, labs sig for troponin .23 I have personally performed a face to face diagnostic evaluation on this patient.  I have reviewed the PA note and agree with the history, exam, and plan of care, except as noted.  History and Exam by me shows left sided chest pain that started 2 days ago when taking a deep breath. not short of breath, .  Patient is NAD.  A n O x 3. Lungs cta bl, heart s1s2, rrr, no murmurs, labs sig for troponin .23.  Admit for eval

## 2021-10-15 NOTE — H&P ADULT - PROBLEM SELECTOR PLAN 2
Most likely demand ischemia   - CK: 419, trop: .234  - Will trend trop till peak   - EKG: Sinus tachycardia with occasional premature ventricular complexes, HR: 116 no acute ischemic changes compared to previous EKG   - Previous exercise stress test (8/28/19) was negative   - Less likely cardiac etiology Most likely demand ischemia   - CK: 419, trop: .234  - Will trend trop till peak   - EKG: Sinus tachycardia with occasional premature ventricular complexes, HR: 116 no acute ischemic changes compared to previous EKG   - Previous exercise stress test (8/28/19) was negative   - Less likely cardiac etiology  -s/p 1 dose of lovenox 100mg. pending repeat cardiac enzymes may need additional lovenox.   -risks and benefits of AC discussed with patient

## 2021-10-15 NOTE — H&P ADULT - ASSESSMENT
60 yo M with PMH CAD (2 stents 2014, 2017, CABGx3 2005, Cath/PCI Poba balloon 2018), chronic back pain, arthritis, pre-diabetes, anxiety, HLD, and HTN presents to the ED with left sided chest pain since Wednesday. Admitted for chest pain and elevated troponin.

## 2021-10-16 LAB
A1C WITH ESTIMATED AVERAGE GLUCOSE RESULT: 5.6 % — SIGNIFICANT CHANGE UP (ref 4–5.6)
ALBUMIN SERPL ELPH-MCNC: 3.6 G/DL — SIGNIFICANT CHANGE UP (ref 3.3–5)
ALP SERPL-CCNC: 104 U/L — SIGNIFICANT CHANGE UP (ref 40–120)
ALT FLD-CCNC: 33 U/L — SIGNIFICANT CHANGE UP (ref 12–78)
ANION GAP SERPL CALC-SCNC: 7 MMOL/L — SIGNIFICANT CHANGE UP (ref 5–17)
AST SERPL-CCNC: 24 U/L — SIGNIFICANT CHANGE UP (ref 15–37)
BASOPHILS # BLD AUTO: 0.05 K/UL — SIGNIFICANT CHANGE UP (ref 0–0.2)
BASOPHILS NFR BLD AUTO: 0.4 % — SIGNIFICANT CHANGE UP (ref 0–2)
BILIRUB SERPL-MCNC: 1.1 MG/DL — SIGNIFICANT CHANGE UP (ref 0.2–1.2)
BUN SERPL-MCNC: 18 MG/DL — SIGNIFICANT CHANGE UP (ref 7–23)
CALCIUM SERPL-MCNC: 9.5 MG/DL — SIGNIFICANT CHANGE UP (ref 8.5–10.1)
CHLORIDE SERPL-SCNC: 108 MMOL/L — SIGNIFICANT CHANGE UP (ref 96–108)
CK MB BLD-MCNC: 0.5 % — SIGNIFICANT CHANGE UP (ref 0–3.5)
CK MB CFR SERPL CALC: 1.7 NG/ML — SIGNIFICANT CHANGE UP (ref 0–3.6)
CK SERPL-CCNC: 343 U/L — HIGH (ref 26–308)
CO2 SERPL-SCNC: 24 MMOL/L — SIGNIFICANT CHANGE UP (ref 22–31)
COVID-19 SPIKE DOMAIN AB INTERP: NEGATIVE — SIGNIFICANT CHANGE UP
COVID-19 SPIKE DOMAIN ANTIBODY RESULT: 0.4 U/ML — SIGNIFICANT CHANGE UP
CREAT SERPL-MCNC: 0.9 MG/DL — SIGNIFICANT CHANGE UP (ref 0.5–1.3)
EOSINOPHIL # BLD AUTO: 0.05 K/UL — SIGNIFICANT CHANGE UP (ref 0–0.5)
EOSINOPHIL NFR BLD AUTO: 0.4 % — SIGNIFICANT CHANGE UP (ref 0–6)
ESTIMATED AVERAGE GLUCOSE: 114 MG/DL — SIGNIFICANT CHANGE UP (ref 68–114)
GLUCOSE SERPL-MCNC: 78 MG/DL — SIGNIFICANT CHANGE UP (ref 70–99)
HCT VFR BLD CALC: 42.3 % — SIGNIFICANT CHANGE UP (ref 39–50)
HGB BLD-MCNC: 14 G/DL — SIGNIFICANT CHANGE UP (ref 13–17)
IMM GRANULOCYTES NFR BLD AUTO: 0.4 % — SIGNIFICANT CHANGE UP (ref 0–1.5)
LYMPHOCYTES # BLD AUTO: 2.5 K/UL — SIGNIFICANT CHANGE UP (ref 1–3.3)
LYMPHOCYTES # BLD AUTO: 21.3 % — SIGNIFICANT CHANGE UP (ref 13–44)
MCHC RBC-ENTMCNC: 30.1 PG — SIGNIFICANT CHANGE UP (ref 27–34)
MCHC RBC-ENTMCNC: 33.1 GM/DL — SIGNIFICANT CHANGE UP (ref 32–36)
MCV RBC AUTO: 91 FL — SIGNIFICANT CHANGE UP (ref 80–100)
MONOCYTES # BLD AUTO: 0.61 K/UL — SIGNIFICANT CHANGE UP (ref 0–0.9)
MONOCYTES NFR BLD AUTO: 5.2 % — SIGNIFICANT CHANGE UP (ref 2–14)
NEUTROPHILS # BLD AUTO: 8.5 K/UL — HIGH (ref 1.8–7.4)
NEUTROPHILS NFR BLD AUTO: 72.3 % — SIGNIFICANT CHANGE UP (ref 43–77)
NRBC # BLD: 0 /100 WBCS — SIGNIFICANT CHANGE UP (ref 0–0)
PLATELET # BLD AUTO: 188 K/UL — SIGNIFICANT CHANGE UP (ref 150–400)
POTASSIUM SERPL-MCNC: 3.8 MMOL/L — SIGNIFICANT CHANGE UP (ref 3.5–5.3)
POTASSIUM SERPL-SCNC: 3.8 MMOL/L — SIGNIFICANT CHANGE UP (ref 3.5–5.3)
PROT SERPL-MCNC: 7.6 G/DL — SIGNIFICANT CHANGE UP (ref 6–8.3)
RBC # BLD: 4.65 M/UL — SIGNIFICANT CHANGE UP (ref 4.2–5.8)
RBC # FLD: 14 % — SIGNIFICANT CHANGE UP (ref 10.3–14.5)
SARS-COV-2 IGG+IGM SERPL QL IA: 0.4 U/ML — SIGNIFICANT CHANGE UP
SARS-COV-2 IGG+IGM SERPL QL IA: NEGATIVE — SIGNIFICANT CHANGE UP
SODIUM SERPL-SCNC: 139 MMOL/L — SIGNIFICANT CHANGE UP (ref 135–145)
TROPONIN I SERPL-MCNC: 0.1 NG/ML — HIGH (ref 0.01–0.04)
WBC # BLD: 11.76 K/UL — HIGH (ref 3.8–10.5)
WBC # FLD AUTO: 11.76 K/UL — HIGH (ref 3.8–10.5)

## 2021-10-16 PROCEDURE — 93306 TTE W/DOPPLER COMPLETE: CPT | Mod: 26

## 2021-10-16 PROCEDURE — 99233 SBSQ HOSP IP/OBS HIGH 50: CPT

## 2021-10-16 PROCEDURE — 99232 SBSQ HOSP IP/OBS MODERATE 35: CPT

## 2021-10-16 RX ADMIN — ATORVASTATIN CALCIUM 80 MILLIGRAM(S): 80 TABLET, FILM COATED ORAL at 23:43

## 2021-10-16 RX ADMIN — Medication 1000 MILLIGRAM(S): at 23:43

## 2021-10-16 RX ADMIN — Medication 650 MILLIGRAM(S): at 15:13

## 2021-10-16 RX ADMIN — TICAGRELOR 90 MILLIGRAM(S): 90 TABLET ORAL at 17:21

## 2021-10-16 RX ADMIN — ENOXAPARIN SODIUM 100 MILLIGRAM(S): 100 INJECTION SUBCUTANEOUS at 06:29

## 2021-10-16 RX ADMIN — LOSARTAN POTASSIUM 25 MILLIGRAM(S): 100 TABLET, FILM COATED ORAL at 06:30

## 2021-10-16 RX ADMIN — Medication 81 MILLIGRAM(S): at 11:51

## 2021-10-16 RX ADMIN — Medication 1000 UNIT(S): at 17:21

## 2021-10-16 RX ADMIN — Medication 650 MILLIGRAM(S): at 18:56

## 2021-10-16 RX ADMIN — TICAGRELOR 90 MILLIGRAM(S): 90 TABLET ORAL at 06:30

## 2021-10-16 RX ADMIN — SERTRALINE 100 MILLIGRAM(S): 25 TABLET, FILM COATED ORAL at 11:51

## 2021-10-16 RX ADMIN — TRAMADOL HYDROCHLORIDE 50 MILLIGRAM(S): 50 TABLET ORAL at 06:30

## 2021-10-16 RX ADMIN — Medication 1000 UNIT(S): at 06:30

## 2021-10-16 RX ADMIN — Medication 50 MILLIGRAM(S): at 06:30

## 2021-10-16 RX ADMIN — ENOXAPARIN SODIUM 100 MILLIGRAM(S): 100 INJECTION SUBCUTANEOUS at 17:21

## 2021-10-16 RX ADMIN — TRAMADOL HYDROCHLORIDE 50 MILLIGRAM(S): 50 TABLET ORAL at 23:33

## 2021-10-16 NOTE — PROGRESS NOTE ADULT - PROBLEM SELECTOR PLAN 8
- S/p FD Lovenox 100 mg x1 in the ED, will continue FD Lovenox for now as per cardio recs  - Pt qualifies for Lovenox 100 mg BID as per pharmacy since pt's creatinine clearance is appropriate

## 2021-10-16 NOTE — PROGRESS NOTE ADULT - PROBLEM SELECTOR PLAN 1
Patient is complaining of left sided pleuritic chest pain radiating to the left neck, improving with ibuprofen   Most likely 2/2 demand ischemia  - s/p Aspirin 325 mg, Percocet, lidocaine patch in the ED    - EKG: Sinus tachycardia with occasional premature ventricular complexes, HR: 116 no acute ischemic changes compared to previous EKG   - Previous exercise stress test (8/28/19) was negative   - CK: 419, trop: .234; most likely demand ischemia, CE downtrended  - WBC: 11.45, neut: 9.6; monitor fever and leukocytosis   - CXR: no acute lung pathology as per personal read compared to previous CXR   - CT Chest: No acute chest pathology,  left basilar streaky atelectasis. Coronary artery calcifications are present. Status post CABG; unlikely PNA   - D dimer negative; unlikely PE   - Pain regimen: Tylenol for mild,  Tramadol 25 mg for moderate and Tramadol 50 mg for severe   - Lidocaine patches prn  -check TTE  - Cardio [facility] group following, f/u recs

## 2021-10-16 NOTE — PROGRESS NOTE ADULT - PROBLEM SELECTOR PLAN 4
S/p 2 stents 2014, 2017, CABGx3 2005, Cath/PCI Poba balloon 2018  - Continue home Brilinta and Aspirin  - Continue home Rosuvastatin therapeutic interchange to Atorvastatin

## 2021-10-16 NOTE — ED ADULT NURSE REASSESSMENT NOTE - NS ED NURSE REASSESS COMMENT FT1
Patient received from RN. Pt A/Ox3 resting in stretcher comfortably. All VSS- +chest rise, RR18. No c/o CP/dizziness/HA/SOB @this time. No acute distress noted. Will continue to monitor.
Received report from SAVANA Tineo.  VSS afebrile.  pt offers no new complaints.  Assisted pt with ADL's, pt oriented to unit.  ambulates independently to bathroom, voids freely.  NSR on cardiac monitor.  awaiting bed placement.  call bell within reach, bed in lowest position.

## 2021-10-16 NOTE — PROGRESS NOTE ADULT - PROBLEM SELECTOR PLAN 2
Most likely demand ischemia   - CK: 419, trop: .234  - Will trend trop till peak (peaked at .243, then downtrended)  - EKG: Sinus tachycardia with occasional premature ventricular complexes, HR: 116 no acute ischemic changes compared to previous EKG   - Previous exercise stress test (8/28/19) was negative   - Less likely cardiac etiology  - on full dose lovenox   -risks and benefits of AC discussed with patient  - check TTE

## 2021-10-16 NOTE — PROGRESS NOTE ADULT - PROBLEM SELECTOR PLAN 5
Chronic   - Continue home Rosuvastatin therapeutic interchange to Atorvastatin and Niacin  - Home Vascepa is nonformulary, pt to bring from home

## 2021-10-16 NOTE — PROGRESS NOTE ADULT - PROBLEM SELECTOR PLAN 3
Chronic  - Stable  - Continue with home medication Irbesartan therapeutic interchange to Losartan w/ hold parameters  - Home medication Metoprolol 25 mg qd was increased to Metoprolol 50mg qd with hold parameters as per Cardio recs  - Monitor BP & titrate BP meds PRN

## 2021-10-17 ENCOUNTER — TRANSCRIPTION ENCOUNTER (OUTPATIENT)
Age: 61
End: 2021-10-17

## 2021-10-17 VITALS
OXYGEN SATURATION: 94 % | RESPIRATION RATE: 18 BRPM | HEART RATE: 85 BPM | DIASTOLIC BLOOD PRESSURE: 83 MMHG | SYSTOLIC BLOOD PRESSURE: 126 MMHG | TEMPERATURE: 99 F

## 2021-10-17 LAB
ANION GAP SERPL CALC-SCNC: 9 MMOL/L — SIGNIFICANT CHANGE UP (ref 5–17)
BUN SERPL-MCNC: 17 MG/DL — SIGNIFICANT CHANGE UP (ref 7–23)
CALCIUM SERPL-MCNC: 9.6 MG/DL — SIGNIFICANT CHANGE UP (ref 8.5–10.1)
CHLORIDE SERPL-SCNC: 105 MMOL/L — SIGNIFICANT CHANGE UP (ref 96–108)
CO2 SERPL-SCNC: 24 MMOL/L — SIGNIFICANT CHANGE UP (ref 22–31)
CREAT SERPL-MCNC: 1 MG/DL — SIGNIFICANT CHANGE UP (ref 0.5–1.3)
GLUCOSE SERPL-MCNC: 86 MG/DL — SIGNIFICANT CHANGE UP (ref 70–99)
HCT VFR BLD CALC: 43.1 % — SIGNIFICANT CHANGE UP (ref 39–50)
HGB BLD-MCNC: 14.3 G/DL — SIGNIFICANT CHANGE UP (ref 13–17)
MAGNESIUM SERPL-MCNC: 2.2 MG/DL — SIGNIFICANT CHANGE UP (ref 1.6–2.6)
MCHC RBC-ENTMCNC: 29.9 PG — SIGNIFICANT CHANGE UP (ref 27–34)
MCHC RBC-ENTMCNC: 33.2 GM/DL — SIGNIFICANT CHANGE UP (ref 32–36)
MCV RBC AUTO: 90.2 FL — SIGNIFICANT CHANGE UP (ref 80–100)
NRBC # BLD: 0 /100 WBCS — SIGNIFICANT CHANGE UP (ref 0–0)
PHOSPHATE SERPL-MCNC: 2.7 MG/DL — SIGNIFICANT CHANGE UP (ref 2.5–4.5)
PLATELET # BLD AUTO: 233 K/UL — SIGNIFICANT CHANGE UP (ref 150–400)
POTASSIUM SERPL-MCNC: 3.8 MMOL/L — SIGNIFICANT CHANGE UP (ref 3.5–5.3)
POTASSIUM SERPL-SCNC: 3.8 MMOL/L — SIGNIFICANT CHANGE UP (ref 3.5–5.3)
RBC # BLD: 4.78 M/UL — SIGNIFICANT CHANGE UP (ref 4.2–5.8)
RBC # FLD: 13.5 % — SIGNIFICANT CHANGE UP (ref 10.3–14.5)
SODIUM SERPL-SCNC: 138 MMOL/L — SIGNIFICANT CHANGE UP (ref 135–145)
WBC # BLD: 11.21 K/UL — HIGH (ref 3.8–10.5)
WBC # FLD AUTO: 11.21 K/UL — HIGH (ref 3.8–10.5)

## 2021-10-17 PROCEDURE — 71045 X-RAY EXAM CHEST 1 VIEW: CPT

## 2021-10-17 PROCEDURE — 99239 HOSP IP/OBS DSCHRG MGMT >30: CPT

## 2021-10-17 PROCEDURE — 82550 ASSAY OF CK (CPK): CPT

## 2021-10-17 PROCEDURE — U0003: CPT

## 2021-10-17 PROCEDURE — 86769 SARS-COV-2 COVID-19 ANTIBODY: CPT

## 2021-10-17 PROCEDURE — 80048 BASIC METABOLIC PNL TOTAL CA: CPT

## 2021-10-17 PROCEDURE — 99232 SBSQ HOSP IP/OBS MODERATE 35: CPT

## 2021-10-17 PROCEDURE — 84484 ASSAY OF TROPONIN QUANT: CPT

## 2021-10-17 PROCEDURE — 83036 HEMOGLOBIN GLYCOSYLATED A1C: CPT

## 2021-10-17 PROCEDURE — 93005 ELECTROCARDIOGRAM TRACING: CPT

## 2021-10-17 PROCEDURE — 93306 TTE W/DOPPLER COMPLETE: CPT

## 2021-10-17 PROCEDURE — 84100 ASSAY OF PHOSPHORUS: CPT

## 2021-10-17 PROCEDURE — 85025 COMPLETE CBC W/AUTO DIFF WBC: CPT

## 2021-10-17 PROCEDURE — U0005: CPT

## 2021-10-17 PROCEDURE — 99285 EMERGENCY DEPT VISIT HI MDM: CPT | Mod: 25

## 2021-10-17 PROCEDURE — 36415 COLL VENOUS BLD VENIPUNCTURE: CPT

## 2021-10-17 PROCEDURE — 82962 GLUCOSE BLOOD TEST: CPT

## 2021-10-17 PROCEDURE — 83735 ASSAY OF MAGNESIUM: CPT

## 2021-10-17 PROCEDURE — 85379 FIBRIN DEGRADATION QUANT: CPT

## 2021-10-17 PROCEDURE — 82553 CREATINE MB FRACTION: CPT

## 2021-10-17 PROCEDURE — 80053 COMPREHEN METABOLIC PANEL: CPT

## 2021-10-17 PROCEDURE — 71250 CT THORAX DX C-: CPT | Mod: MA

## 2021-10-17 PROCEDURE — 85027 COMPLETE CBC AUTOMATED: CPT

## 2021-10-17 RX ORDER — TICAGRELOR 90 MG/1
1 TABLET ORAL
Qty: 0 | Refills: 0 | DISCHARGE
Start: 2021-10-17

## 2021-10-17 RX ORDER — ALPRAZOLAM 0.25 MG
1 TABLET ORAL
Qty: 6 | Refills: 0
Start: 2021-10-17 | End: 2021-10-19

## 2021-10-17 RX ORDER — ALPRAZOLAM 0.25 MG
0.25 TABLET ORAL
Refills: 0 | Status: DISCONTINUED | OUTPATIENT
Start: 2021-10-17 | End: 2021-10-17

## 2021-10-17 RX ORDER — TICAGRELOR 90 MG/1
1 TABLET ORAL
Qty: 0 | Refills: 0 | DISCHARGE

## 2021-10-17 RX ORDER — ACETAMINOPHEN 500 MG
2 TABLET ORAL
Qty: 0 | Refills: 0 | DISCHARGE
Start: 2021-10-17

## 2021-10-17 RX ORDER — METFORMIN HYDROCHLORIDE 850 MG/1
1 TABLET ORAL
Qty: 0 | Refills: 0 | DISCHARGE

## 2021-10-17 RX ORDER — EMPAGLIFLOZIN 10 MG/1
1 TABLET, FILM COATED ORAL
Qty: 0 | Refills: 0 | DISCHARGE

## 2021-10-17 RX ORDER — PIOGLITAZONE HYDROCHLORIDE 15 MG/1
1 TABLET ORAL
Qty: 0 | Refills: 0 | DISCHARGE

## 2021-10-17 RX ADMIN — LOSARTAN POTASSIUM 25 MILLIGRAM(S): 100 TABLET, FILM COATED ORAL at 06:01

## 2021-10-17 RX ADMIN — Medication 50 MILLIGRAM(S): at 06:01

## 2021-10-17 RX ADMIN — TICAGRELOR 90 MILLIGRAM(S): 90 TABLET ORAL at 06:01

## 2021-10-17 RX ADMIN — ENOXAPARIN SODIUM 100 MILLIGRAM(S): 100 INJECTION SUBCUTANEOUS at 06:01

## 2021-10-17 RX ADMIN — Medication 0.25 MILLIGRAM(S): at 11:19

## 2021-10-17 RX ADMIN — Medication 81 MILLIGRAM(S): at 11:06

## 2021-10-17 RX ADMIN — Medication 1000 UNIT(S): at 06:02

## 2021-10-17 RX ADMIN — SERTRALINE 100 MILLIGRAM(S): 25 TABLET, FILM COATED ORAL at 11:06

## 2021-10-17 NOTE — PROGRESS NOTE ADULT - SUBJECTIVE AND OBJECTIVE BOX
[facility] -- Aaliyah Luther, Alex, Abdias, Adam Hodges Savella  Office # [phone]      Follow Up:  chest pain     Subjective/Observations:       No complaints of chest pain, dyspnea, or palpitations reported. No signs of orthopnea or PND.  overnight with left sided chest discomfort with inspiration now resolved   REVIEW OF SYSTEMS: All other review of systems is negative unless indicated above    PAST MEDICAL & SURGICAL HISTORY:  S/P CABG    CAD (coronary artery disease)  CABG x3    HTN (hypertension)    HLD (hyperlipidemia)    Complex tear of medial meniscus, current injury, left knee, initial encounter    Prediabetes    Arthritis    Back pain    Stented coronary artery  promus premier stent pSVG to OM 2014 and promus premier stent x2 SVG to OM 2017    CAD (Coronary Artery Disease), Nonautologous Biological Bypass Graft    CAD (Coronary Artery Disease) of Artery Bypass Graft  2005 - March 17th    Pneumonia due to Organism    Hernia, Inguinal    Hypercholesterolemia    S/P CABG x 3  2005  3vCABG 2005 (LIMA to LAD, SVG to OM, left radial to Diag)   [facility]    Hernia  repaired    Eye abnormality  left eye amblyopia repaired in 1994    Status post coronary artery stent placement  Cardiac Stent Placed April 30th 2014 @ [facility]  Cardiac Stent X 2 placed January 15th 2017 (piggybacked) @ [facility]    CAD S/P percutaneous coronary angioplasty  baloon angioplasty Sep 2018        MEDICATIONS  (STANDING):  aspirin enteric coated 81 milliGRAM(s) Oral daily  atorvastatin 80 milliGRAM(s) Oral at bedtime  cholecalciferol 1000 Unit(s) Oral two times a day  dextrose 40% Gel 15 Gram(s) Oral once  dextrose 5%. 1000 milliLiter(s) (50 mL/Hr) IV Continuous <Continuous>  dextrose 5%. 1000 milliLiter(s) (100 mL/Hr) IV Continuous <Continuous>  dextrose 50% Injectable 25 Gram(s) IV Push once  dextrose 50% Injectable 12.5 Gram(s) IV Push once  dextrose 50% Injectable 25 Gram(s) IV Push once  enoxaparin Injectable 100 milliGRAM(s) SubCutaneous two times a day  glucagon  Injectable 1 milliGRAM(s) IntraMuscular once  insulin lispro (ADMELOG) corrective regimen sliding scale   SubCutaneous three times a day before meals  insulin lispro (ADMELOG) corrective regimen sliding scale   SubCutaneous at bedtime  losartan 25 milliGRAM(s) Oral daily  metoprolol succinate ER 50 milliGRAM(s) Oral daily  niacin SR 1000 milliGRAM(s) Oral at bedtime  sertraline 100 milliGRAM(s) Oral daily  ticagrelor 90 milliGRAM(s) Oral every 12 hours    MEDICATIONS  (PRN):  acetaminophen   Tablet .. 650 milliGRAM(s) Oral every 6 hours PRN Mild Pain (1 - 3)  traMADol 25 milliGRAM(s) Oral every 6 hours PRN Moderate Pain (4 - 6)  traMADol 50 milliGRAM(s) Oral every 6 hours PRN Severe Pain (7 - 10)      Allergies    No Known Allergies    Intolerances        Vital Signs Last 24 Hrs  T(C): 36.9 (17 Oct 2021 04:39), Max: 37.4 (16 Oct 2021 15:10)  T(F): 98.4 (17 Oct 2021 04:39), Max: 99.4 (16 Oct 2021 15:10)  HR: 93 (17 Oct 2021 04:39) (77 - 93)  BP: 139/83 (17 Oct 2021 04:39) (115/80 - 139/83)  BP(mean): --  RR: 18 (17 Oct 2021 04:39) (15 - 18)  SpO2: 94% (17 Oct 2021 04:39) (94% - 97%)    I&O's Summary        PHYSICAL EXAM:  TELE: SR  Constitutional: NAD, awake and alert, well-developed  HEENT: Moist Mucous Membranes, Anicteric  Pulmonary: Non-labored, breath sounds are clear bilaterally, No wheezing, crackles or rhonchi  Cardiovascular: Regular, S1 and S2 nl, No murmurs, rubs, gallops or clicks  Gastrointestinal: Bowel Sounds present, soft, nontender.   Lymph: No lymphadenopathy. No peripheral edema.  Skin: No visible rashes or ulcers.  Psych:  Mood & affect appropriate    LABS: All Labs Reviewed:                        14.0   11.76 )-----------( 188      ( 16 Oct 2021 05:26 )             42.3                         14.2   11.45 )-----------( 206      ( 15 Oct 2021 11:51 )             42.5     16 Oct 2021 05:26    139    |  108    |  18     ----------------------------<  78     3.8     |  24     |  0.90   15 Oct 2021 11:51    137    |  106    |  17     ----------------------------<  127    4.1     |  26     |  1.20     Ca    9.5        16 Oct 2021 05:26  Ca    9.7        15 Oct 2021 11:51    TPro  7.6    /  Alb  3.6    /  TBili  1.1    /  DBili  x      /  AST  24     /  ALT  33     /  AlkPhos  104    16 Oct 2021 05:26  TPro  8.2    /  Alb  3.9    /  TBili  1.3    /  DBili  x      /  AST  34     /  ALT  35     /  AlkPhos  108    15 Oct 2021 11:51      CARDIAC MARKERS ( 16 Oct 2021 05:27 )  .098 ng/mL / x     / 343 U/L / x     / 1.7 ng/mL  CARDIAC MARKERS ( 15 Oct 2021 20:25 )  .243 ng/mL / x     / 355 U/L / x     / 2.0 ng/mL  CARDIAC MARKERS ( 15 Oct 2021 15:25 )  x     / x     / 419 U/L / x     / x      CARDIAC MARKERS ( 15 Oct 2021 11:51 )  .234 ng/mL / x     / x     / x     / 1.5 ng/mL       12 Lead ECG (10.15.21 @ 11:19) >  Ventricular Rate 116 BPM    Atrial Rate 116 BPM    P-R Interval 172 ms    QRS Duration 84 ms    Q-T Interval 326 ms    QTC Calculation(Bazett) 453 ms    P Axis 59 degrees    R Axis 25 degrees    T Axis -1 degrees    Diagnosis Line Sinus tachycardiawith occasional premature ventricular complexes  Inferior infarct (cited on or before 26-FEB-2021)  Abnormal ECG               
[facility] -- Aaliyah Luther, Alex, Abdias, Carroll, Crhis Ochoa  Office # [phone]      Follow Up:    Elevated troponins Chest pain  Subjective/Observations:   No events overnight resting comfortably in bed.  No complaints of  dyspnea, or palpitations reported. No signs of orthopnea or PND. C/O pleuritic chest pain but with decreased intensity and frequency    REVIEW OF SYSTEMS: All other review of systems is negative unless indicated above    PAST MEDICAL & SURGICAL HISTORY:  S/P CABG    CAD (coronary artery disease)  CABG x3    HTN (hypertension)    HLD (hyperlipidemia)    Complex tear of medial meniscus, current injury, left knee, initial encounter    Prediabetes    Arthritis    Back pain    Stented coronary artery  promus premier stent pSVG to OM 2014 and promus premier stent x2 SVG to OM 2017    CAD (Coronary Artery Disease), Nonautologous Biological Bypass Graft    CAD (Coronary Artery Disease) of Artery Bypass Graft  2005 - March 17th    Pneumonia due to Organism    Hernia, Inguinal    Hypercholesterolemia    S/P CABG x 3  2005  3vCABG 2005 (LIMA to LAD, SVG to OM, left radial to Diag)   [facility]    Hernia  repaired    Eye abnormality  left eye amblyopia repaired in 1994    Status post coronary artery stent placement  Cardiac Stent Placed April 30th 2014 @ [facility]  Cardiac Stent X 2 placed January 15th 2017 (piggybacked) @ [facility]    CAD S/P percutaneous coronary angioplasty  baloon angioplasty Sep 2018        MEDICATIONS  (STANDING):  aspirin enteric coated 81 milliGRAM(s) Oral daily  atorvastatin 80 milliGRAM(s) Oral at bedtime  cholecalciferol 1000 Unit(s) Oral two times a day  dextrose 40% Gel 15 Gram(s) Oral once  dextrose 5%. 1000 milliLiter(s) (50 mL/Hr) IV Continuous <Continuous>  dextrose 5%. 1000 milliLiter(s) (100 mL/Hr) IV Continuous <Continuous>  dextrose 50% Injectable 25 Gram(s) IV Push once  dextrose 50% Injectable 12.5 Gram(s) IV Push once  dextrose 50% Injectable 25 Gram(s) IV Push once  enoxaparin Injectable 100 milliGRAM(s) SubCutaneous two times a day  glucagon  Injectable 1 milliGRAM(s) IntraMuscular once  insulin lispro (ADMELOG) corrective regimen sliding scale   SubCutaneous three times a day before meals  insulin lispro (ADMELOG) corrective regimen sliding scale   SubCutaneous at bedtime  losartan 25 milliGRAM(s) Oral daily  metoprolol succinate ER 50 milliGRAM(s) Oral daily  niacin SR 1000 milliGRAM(s) Oral at bedtime  sertraline 100 milliGRAM(s) Oral daily  ticagrelor 90 milliGRAM(s) Oral every 12 hours    MEDICATIONS  (PRN):  acetaminophen   Tablet .. 650 milliGRAM(s) Oral every 6 hours PRN Mild Pain (1 - 3)  traMADol 25 milliGRAM(s) Oral every 6 hours PRN Moderate Pain (4 - 6)  traMADol 50 milliGRAM(s) Oral every 6 hours PRN Severe Pain (7 - 10)      Allergies    No Known Allergies    Intolerances        Vital Signs Last 24 Hrs  T(C): 36.9 (16 Oct 2021 07:23), Max: 37.3 (15 Oct 2021 18:09)  T(F): 98.4 (16 Oct 2021 07:23), Max: 99.1 (15 Oct 2021 18:09)  HR: 86 (16 Oct 2021 07:23) (79 - 100)  BP: 126/69 (16 Oct 2021 07:23) (123/79 - 141/84)  BP(mean): --  RR: 16 (16 Oct 2021 07:23) (16 - 17)  SpO2: 97% (16 Oct 2021 07:23) (97% - 98%)    I&O's Summary        PHYSICAL EXAM:  TELE: SR  Constitutional: NAD, awake and alert, well-developed  HEENT: Moist Mucous Membranes, Anicteric  Pulmonary: Non-labored, breath sounds are clear bilaterally, No wheezing, crackles or rhonchi  Cardiovascular: Regular, S1 and S2 nl, No murmurs, rubs, gallops or clicks  Gastrointestinal: Bowel Sounds present, soft, nontender.   Lymph: No lymphadenopathy. No peripheral edema.  Skin: No visible rashes or ulcers.  Psych:  Mood & affect appropriate    LABS: All Labs Reviewed:                        14.0   11.76 )-----------( 188      ( 16 Oct 2021 05:26 )             42.3                         14.2   11.45 )-----------( 206      ( 15 Oct 2021 11:51 )             42.5     16 Oct 2021 05:26    139    |  108    |  18     ----------------------------<  78     3.8     |  24     |  0.90   15 Oct 2021 11:51    137    |  106    |  17     ----------------------------<  127    4.1     |  26     |  1.20     Ca    9.5        16 Oct 2021 05:26  Ca    9.7        15 Oct 2021 11:51    TPro  7.6    /  Alb  3.6    /  TBili  1.1    /  DBili  x      /  AST  24     /  ALT  33     /  AlkPhos  104    16 Oct 2021 05:26  TPro  8.2    /  Alb  3.9    /  TBili  1.3    /  DBili  x      /  AST  34     /  ALT  35     /  AlkPhos  108    15 Oct 2021 11:51      CARDIAC MARKERS ( 16 Oct 2021 05:27 )  .098 ng/mL / x     / 343 U/L / x     / 1.7 ng/mL  CARDIAC MARKERS ( 15 Oct 2021 20:25 )  .243 ng/mL / x     / 355 U/L / x     / 2.0 ng/mL  CARDIAC MARKERS ( 15 Oct 2021 15:25 )  x     / x     / 419 U/L / x     / x      CARDIAC MARKERS ( 15 Oct 2021 11:51 )  .234 ng/mL / x     / x     / x     / 1.5 ng/mL    < from: 12 Lead ECG (10.15.21 @ 11:19) >  Ventricular Rate 116 BPM    Atrial Rate 116 BPM    P-R Interval 172 ms    QRS Duration 84 ms    Q-T Interval 326 ms    QTC Calculation(Bazett) 453 ms    P Axis 59 degrees    R Axis 25 degrees    T Axis -1 degrees    Diagnosis Line Sinus tachycardiawith occasional premature ventricular complexes  Inferior infarct (cited on or before 26-FEB-2021)  Abnormal ECG    Confirmed by KERLINE OCHOA ([id number]) on 10/15/2021 5:47:04 PM    < end of copied text >                    
INTERVAL HPI/OVERNIGHT EVENTS: Patient seen and examined at bedside. States he is feeling better today. L sided chest pain has improved. Denies any SOB at rest or exertion. Denies any lightheadedness/dizziness.     MEDICATIONS  (STANDING):  aspirin enteric coated 81 milliGRAM(s) Oral daily  atorvastatin 80 milliGRAM(s) Oral at bedtime  cholecalciferol 1000 Unit(s) Oral two times a day  dextrose 40% Gel 15 Gram(s) Oral once  dextrose 5%. 1000 milliLiter(s) (50 mL/Hr) IV Continuous <Continuous>  dextrose 5%. 1000 milliLiter(s) (100 mL/Hr) IV Continuous <Continuous>  dextrose 50% Injectable 25 Gram(s) IV Push once  dextrose 50% Injectable 12.5 Gram(s) IV Push once  dextrose 50% Injectable 25 Gram(s) IV Push once  enoxaparin Injectable 100 milliGRAM(s) SubCutaneous two times a day  glucagon  Injectable 1 milliGRAM(s) IntraMuscular once  insulin lispro (ADMELOG) corrective regimen sliding scale   SubCutaneous three times a day before meals  insulin lispro (ADMELOG) corrective regimen sliding scale   SubCutaneous at bedtime  losartan 25 milliGRAM(s) Oral daily  metoprolol succinate ER 50 milliGRAM(s) Oral daily  niacin SR 1000 milliGRAM(s) Oral at bedtime  sertraline 100 milliGRAM(s) Oral daily  ticagrelor 90 milliGRAM(s) Oral every 12 hours    MEDICATIONS  (PRN):  acetaminophen   Tablet .. 650 milliGRAM(s) Oral every 6 hours PRN Mild Pain (1 - 3)  traMADol 25 milliGRAM(s) Oral every 6 hours PRN Moderate Pain (4 - 6)  traMADol 50 milliGRAM(s) Oral every 6 hours PRN Severe Pain (7 - 10)      Allergies    No Known Allergies    Intolerances      ROS:  CONSTITUTIONAL: No weakness, fevers or chills  EYES/ENT: No visual changes;  No vertigo or throat pain   NECK: No pain or stiffness  RESPIRATORY: No cough, wheezing, hemoptysis; No shortness of breath  CARDIOVASCULAR: No chest pain or palpitations  GASTROINTESTINAL: No abdominal or epigastric pain. No nausea, vomiting, or hematemesis  GENITOURINARY: No dysuria, frequency or hematuria  NEUROLOGICAL: No numbness or weakness  SKIN: No itching, burning, rashes, or lesions   All other review of systems is negative unless indicated above.    Vital Signs Last 24 Hrs  T(C): 37.4 (16 Oct 2021 15:10), Max: 37.4 (16 Oct 2021 15:10)  T(F): 99.4 (16 Oct 2021 15:10), Max: 99.4 (16 Oct 2021 15:10)  HR: 78 (16 Oct 2021 15:10) (78 - 91)  BP: 121/79 (16 Oct 2021 15:10) (121/79 - 141/84)  BP(mean): --  RR: 18 (16 Oct 2021 15:10) (16 - 18)  SpO2: 96% (16 Oct 2021 15:10) (96% - 98%)    Physical Exam:  General: WN/WD NAD  Neurology: A&Ox3, nonfocal, FRIED x 4  Respiratory: CTA B/L  CV: RRR, S1S2, no murmurs, rubs or gallops  Abdominal: Soft, NT, ND +BS  Extremities: No edema, + peripheral pulses      LABS:                        14.0   11.76 )-----------( 188      ( 16 Oct 2021 05:26 )             42.3     10-16    139  |  108  |  18  ----------------------------<  78  3.8   |  24  |  0.90    Ca    9.5      16 Oct 2021 05:26    TPro  7.6  /  Alb  3.6  /  TBili  1.1  /  DBili  x   /  AST  24  /  ALT  33  /  AlkPhos  104  10-16          RADIOLOGY & ADDITIONAL TESTS:

## 2021-10-17 NOTE — PROGRESS NOTE ADULT - ATTENDING COMMENTS
Pt is a 61 year old male pmhx CAD (2 stents 2014, 2017, CABGx3 2005, Cath/PCI Poba balloon 2018), chronic back pain, arthritis, pre-diabetes, HLD, and HTN presents with left sided, sharp chest pain when he takes a deep breath that started 2 days ago.    #Chest pain  - No meaningful evidence of volume overload.  - Previous TTE (8/28/18) shows LVEF 67%, normal size and function.  - Previous exercise stress test (8/28/19) - No CP, no ekg abnormalities  - D dimer negative, PE unlikely   - CXR - L lower lobe infiltrated compared to previous  - Chest CT - No acute pathology  - Trop peaked now downtrending   - Abnormal cardiac biomarkers  not consistent with ACS, more likely from demand   - Continue ASA, Brilinta and other home meds  - metoprolol er 50mg qd with hold parameters   - Admit to tele for further monitoring   - Monitor and replete lytes, keep K>4, Mg>2.  - Strict I/Os, daily weights.  -TTE pending     #HTN  - BP well controlled, monitor routine hemodynamics.  - metoprolol er 50mg qd with hold parameters     CAD   - S/p 2 stents 2014, 2017, CABGx3 2005, Cath/PCI Poba balloon 2018  - As per above  - Continue home statin
61 year old male pmhx CAD (2 stents 2014, 2017, CABGx3 2005, Cath/PCI Poba balloon 2018), chronic back pain, arthritis, pre-diabetes, HLD, and HTN presents with left sided, sharp chest pain when he takes a deep breath that started 2 days ago.    Chest pain  - Trop peaked now downtrending   - Abnormal cardiac biomarkers  not consistent with ACS, more likely from demand   - will need ischemic evaluation  - Continue ASA, Brilinta and other home meds  - metoprolol er 50mg qd with hold parameters

## 2021-10-17 NOTE — DISCHARGE NOTE PROVIDER - HOSPITAL COURSE
ADMISSION DATE:  10-15-21    ---  FROM ADMISSION H+P:   HPI:  62 yo M with PMH CAD (2 stents 2014, 2017, CABGx3 2005, Cath/PCI Poba balloon 2018), chronic back pain, arthritis, pre-diabetes, anxiety, HLD, and HTN presents to the ED with left sided chest pain since Wednesday. Chest pain only occurs when he takes deep breaths, pt denies chest pain at rest. Pain is intermittent, "sore", and radiates to the left neck during deep breaths. Denies any numbness or tingling. Pt admits to slight SOB on exertion such as going up the stairs. He also states that he has a mild dry cough. Pt took Ibuprofen yesterday which helped symptoms. Pt was diaphoretic last night along with chills, and had trouble getting to sleep. Of note, pt has a history of PNA years ago and states that symptoms feel similar to that incident except that he had pain instead of chest pain. Pt went to urgent care this morning to have a chest xray to evaluate for PNA and was then sent to the ED to evaluate for a cardiac etiology given his extensive cardiac history. Of note, pt was helping clean his mother's house who recently passed away from a stroke and was moving boxes around. Pt denies any history of PE or DVT, denies any leg swelling. Pt took all his morning medications.  Denies palpitations, abdominal pain, nausea, vomiting, diarrhea, constipation, urinary frequency, urgency, or dysuria, headaches, changes in vision, dizziness.   Denies recent travel, recent antibiotic use, or sick contacts.  Pt is not vaccinated for COVID.     ED Course:   Vitals: BP: 130/82, HR: 112, Temp: 98.7 F, RR: 16, SpO2: 98% on RA   Labs: WBC: 11.45, neut: 9.61, gluc: 127, CK: 419, trop: .234, Ddimer <150  COVID negative   CT Chest: No acute chest pathology,  left basilar streaky atelectasis. Coronary artery calcifications are present. Status post CABG  CXR: no acute lung pathology as per personal read compared to previous CXR, pending official read   EKG:  Sinus tachycardia with occasional premature ventricular complexes, HR: 116  Received Aspirin 325 mg, Lovenox 100 mg, Percocet, lidocaine patch and 1L NS bolus x1 in the ED     (15 Oct 2021 18:02)      ---  HOSPITAL COURSE/PERTINENT LABS/PROCEDURES PERFORMED/PENDING TESTS:  Patient admitted to telemetry for further monitoring and workup. Patient evaluated by Cardiology throughout hospital stay. Patient's cardiac enzymes trended to peak. Patient had TTE with normal EF (60-65%). Patient's symptoms improved, but still stating he has some discomfort from time to time. Patient started on xanax for anxiety, stated medication helped. Patient to be discharged home with close outpatient follow up.    ---  PATIENT CONDITION:  - stable    ---  PHYSICAL EXAM ON DAY OF DISCHARGE:  General: Well developed, well nourished, NAD  Neurology: A&Ox3, nonfocal, CN II-XII grossly intact, sensation intact, no gait abnormalities   Respiratory: CTA B/L, No W/R/R  CV: RRR, +S1/S2, no murmurs, rubs or gallops  Abdominal: Soft, NT, ND +BSx4  Extremities: No C/C/E, + peripheral pulses  MSK: Normal ROM, no joint erythema or warmth, no joint swelling   Skin: warm, dry    ---  CONSULTANTS:     ---  ADVANCED CARE PLANNING:  - Code status:  full    - MOLST completed:      [ x ] NO     [  ] YES    ---  TIME SPENT:  I, the attending physician, was physically present for the key portions of the evaluation and management (E/M) service provided. The total amount of time spent reviewing the hospital notes, laboratory values, imaging findings, assessing/counseling the patient, discussing with consultant physicians, social work, nursing staff was 39 minutes

## 2021-10-17 NOTE — DISCHARGE NOTE PROVIDER - NSDCMRMEDTOKEN_GEN_ALL_CORE_FT
acetaminophen 325 mg oral tablet: 2 tab(s) orally every 6 hours, As needed, Mild Pain (1 - 3)  ALPRAZolam 0.25 mg oral tablet: 1 tab(s) orally 2 times a day, As needed, anxiety MDD:2 tabs  Aspirin Low Strength 81 mg oral tablet: 1 tab(s) orally once a day (at bedtime)  irbesartan 75 mg oral tablet: 1 tab(s) orally once a day - IN AM  Metoprolol Succinate ER 25 mg oral tablet, extended release: 1 tab(s) orally once a day - IN AM  niacin 1000 mg oral tablet, extended release: 1 tab(s) orally once a day (at bedtime)  rosuvastatin 40 mg oral tablet: 1 tab(s) orally once a day (at bedtime)  sertraline 100 mg oral tablet: 1 tab(s) orally once a day - IN AM  ticagrelor 90 mg oral tablet: 1 tab(s) orally every 12 hours  Vascepa 1 g oral capsule: 2 cap(s) orally 2 times a day  Vitamin D3 1000 intl units oral tablet: 1 tab(s) orally 2 times a day

## 2021-10-17 NOTE — DISCHARGE NOTE NURSING/CASE MANAGEMENT/SOCIAL WORK - PATIENT PORTAL LINK FT
You can access the FollowMyHealth Patient Portal offered by [facility] by registering at the following website: [email or web address]. By joining [employer]’s FollowMyHealth portal, you will also be able to view your health information using other applications (apps) compatible with our system.

## 2021-10-17 NOTE — DISCHARGE NOTE PROVIDER - NSDCCPCAREPLAN_GEN_ALL_CORE_FT
PRINCIPAL DISCHARGE DIAGNOSIS  Diagnosis: Elevated troponin  Assessment and Plan of Treatment: You presented with chest pain and elevated cardiac enzymes. Your enzymes downtrended. You had an echocardiogram with normal ventricular function. It is strongly recommended you follow up with your Cardiologist within 1 week to schedule a stress test. Please stay well hydrated. Continue your medications as prescribed. If experiencing worsening SOB, severe chest pain, please return to emergency department immediately.      SECONDARY DISCHARGE DIAGNOSES  Diagnosis: Pre-diabetes  Assessment and Plan of Treatment: Your HgbA1c in hospital was 5.6. You do not need to take any diabetic medications at this point as it may cause hypoglycemia. Please follow up with your PCP/Endocrinologist within 2 weeks for further workup.

## 2021-10-17 NOTE — PROGRESS NOTE ADULT - ASSESSMENT
Pt is a 61 year old male pmhx CAD (2 stents 2014, 2017, CABGx3 2005, Cath/PCI Poba balloon 2018), chronic back pain, arthritis, pre-diabetes, HLD, and HTN presents with left sided, sharp chest pain when he takes a deep breath that started 2 days ago.    #Chest pain  - Patient is complaining of sharp pleuritic chest pain, denies pain at rest or with exertion  - EKG - sinus tach with PVCs, no acute ischemic changes compared to previous.  - No meaningful evidence of volume overload.  - Previous TTE (8/28/18) shows LVEF 67%, normal size and function.  - Previous exercise stress test (8/28/19) - No CP, no ekg abnormalities  - D dimer negative, PE unlikely   - CXR - L lower lobe infiltrated compared to previous  - Chest CT - No acute pathology  - Trop peaked now downtrending   - Abnormal cardiac biomarkers  not consistent with ACS, more likely from demand   - Continue ASA, Brilinta and other home meds  - metoprolol er 50mg qd with hold parameters   - Admit to tele for further monitoring   - Monitor and replete lytes, keep K>4, Mg>2.  - Strict I/Os, daily weights.  -TTE pending     #HTN  - BP well controlled, monitor routine hemodynamics.  - metoprolol er 50mg qd with hold parameters     CAD   - S/p 2 stents 2014, 2017, CABGx3 2005, Cath/PCI Poba balloon 2018  - As per above  - Continue home statin    HLD   - As per above    #DVT prophylaxis  - Full dose lovenox started, stat dose given now    - Other cardiovascular workup will depend on clinical course.  - All other workup per primary team.  - Will continue to follow.  Wai Sanchez DNP, ANP-c  Cardiology   Spectra #[phone]  [phone]   
61 year old male pmhx CAD (2 stents 2014, 2017, CABGx3 2005, Cath/PCI Poba balloon 2018), chronic back pain, arthritis, pre-diabetes, HLD, and HTN presents with left sided, sharp chest pain when he takes a deep breath that started 2 days ago.    Chest pain  - EKG - sinus tach with PVCs, no acute ischemic changes compared to previous.  - No meaningful evidence of volume overload.  - Previous TTE (8/28/18) shows LVEF 67%, normal size and function.  - Previous exercise stress test (8/28/19) - No CP, no ekg abnormalities  - D dimer negative, PE unlikely   - CXR - L lower lobe infiltrated compared to previous  - Chest CT - No acute pathology  - Trop peaked now downtrending   - Abnormal cardiac biomarkers  not consistent with ACS, more likely from demand   - Continue ASA, Brilinta and other home meds  - metoprolol er 50mg qd with hold parameters   - Admit to tele for further monitoring   - Monitor and replete lytes, keep K>4, Mg>2.  - Strict I/Os, daily weights.  -TTE results pending       CAD   - S/p 2 stents 2014, 2017, CABGx3 2005, Cath/PCI Poba balloon 2018  - As per above  - Continue home statin  -will need ischemic eval likely NST as outpatient , fu echo results     HLD   - As per above    Shameka Payton FNP-C  Cardiology NP  [facility] [phone]    
60 yo M with PMH CAD (2 stents 2014, 2017, CABGx3 2005, Cath/PCI Poba balloon 2018), chronic back pain, arthritis, pre-diabetes, anxiety, HLD, and HTN presents to the ED with left sided chest pain since Wednesday. Admitted for chest pain and elevated troponin.

## 2022-01-01 ENCOUNTER — EMERGENCY (EMERGENCY)
Facility: HOSPITAL | Age: 62
LOS: 1 days | Discharge: HOME | End: 2022-01-01
Attending: EMERGENCY MEDICINE | Admitting: EMERGENCY MEDICINE
Payer: COMMERCIAL

## 2022-01-01 VITALS
HEART RATE: 108 BPM | RESPIRATION RATE: 18 BRPM | HEIGHT: 71 IN | TEMPERATURE: 98 F | SYSTOLIC BLOOD PRESSURE: 121 MMHG | WEIGHT: 218.04 LBS | OXYGEN SATURATION: 98 % | DIASTOLIC BLOOD PRESSURE: 88 MMHG

## 2022-01-01 DIAGNOSIS — I25.10 ATHEROSCLEROTIC HEART DISEASE OF NATIVE CORONARY ARTERY WITHOUT ANGINA PECTORIS: Chronic | ICD-10-CM

## 2022-01-01 DIAGNOSIS — Z95.1 PRESENCE OF AORTOCORONARY BYPASS GRAFT: Chronic | ICD-10-CM

## 2022-01-01 DIAGNOSIS — Q15.9 CONGENITAL MALFORMATION OF EYE, UNSPECIFIED: Chronic | ICD-10-CM

## 2022-01-01 DIAGNOSIS — K46.9 UNSPECIFIED ABDOMINAL HERNIA WITHOUT OBSTRUCTION OR GANGRENE: Chronic | ICD-10-CM

## 2022-01-01 DIAGNOSIS — Z95.5 PRESENCE OF CORONARY ANGIOPLASTY IMPLANT AND GRAFT: Chronic | ICD-10-CM

## 2022-01-01 PROCEDURE — 73630 X-RAY EXAM OF FOOT: CPT | Mod: 26,LT

## 2022-01-01 PROCEDURE — 99283 EMERGENCY DEPT VISIT LOW MDM: CPT | Mod: 25

## 2022-01-01 PROCEDURE — 73630 X-RAY EXAM OF FOOT: CPT

## 2022-01-01 PROCEDURE — 99284 EMERGENCY DEPT VISIT MOD MDM: CPT

## 2022-01-01 PROCEDURE — 29515 APPLICATION SHORT LEG SPLINT: CPT | Mod: LT

## 2022-01-01 RX ORDER — OXYCODONE AND ACETAMINOPHEN 5; 325 MG/1; MG/1
1 TABLET ORAL ONCE
Refills: 0 | Status: DISCONTINUED | OUTPATIENT
Start: 2022-01-01 | End: 2022-01-01

## 2022-01-01 RX ORDER — OXYCODONE AND ACETAMINOPHEN 5; 325 MG/1; MG/1
1 TABLET ORAL
Qty: 12 | Refills: 0
Start: 2022-01-01 | End: 2022-01-03

## 2022-01-01 RX ADMIN — OXYCODONE AND ACETAMINOPHEN 1 TABLET(S): 5; 325 TABLET ORAL at 10:59

## 2022-01-01 NOTE — ED ADULT TRIAGE NOTE - CHIEF COMPLAINT QUOTE
Patient is a 62yo male complaining of left foot injury. Patient states that he walked into a file cabinet last night

## 2022-01-01 NOTE — ED PROVIDER NOTE - NSICDXPASTSURGICALHX_GEN_ALL_CORE_FT
PAST SURGICAL HISTORY:  CAD (Coronary Artery Disease) of Artery Bypass Graft 2005 - March 17th    CAD (Coronary Artery Disease), Nonautologous Biological Bypass Graft     CAD S/P percutaneous coronary angioplasty baloon angioplasty Sep 2018    Eye abnormality left eye amblyopia repaired in 1994    Hernia repaired    Hernia, Inguinal     Hypercholesterolemia     Pneumonia due to Organism     S/P CABG x 3 2005  3vCABG 2005 (LIMA to LAD, SVG to OM, left radial to Diag)   NYQ    Status post coronary artery stent placement Cardiac Stent Placed April 30th 2014 @ [facility]  Cardiac Stent X 2 placed January 15th 2017 (piggybacked) @ [facility]

## 2022-01-01 NOTE — ED PROVIDER NOTE - CLINICAL SUMMARY MEDICAL DECISION MAKING FREE TEXT BOX
pt is a 60yo male with pmhx of cad s/p stents and CABG, htn, presents with foot injury. pt reports he hit his left foot against a metal cabinet at home aprox 6am. pt reports pain and swelling first digit. pt reports difficulty ambulating due to pain. pt with foot injury. will x ray to r/o fx, pain control and re-eval

## 2022-01-01 NOTE — ED ADULT NURSE NOTE - NSICDXPASTSURGICALHX_GEN_ALL_CORE_FT
PAST SURGICAL HISTORY:  CAD (Coronary Artery Disease) of Artery Bypass Graft 2005 - March 17th    CAD (Coronary Artery Disease), Nonautologous Biological Bypass Graft     CAD S/P percutaneous coronary angioplasty baloon angioplasty Sep 2018    Eye abnormality left eye amblyopia repaired in 1994    Hernia repaired    Hernia, Inguinal     Hypercholesterolemia     Pneumonia due to Organism     S/P CABG x 3 2005  3vCABG 2005 (LIMA to LAD, SVG to OM, left radial to Diag)   NYQ    Status post coronary artery stent placement Cardiac Stent Placed April 30th 2014 @ [facility]  Cardiac Stent X 2 placed January 15th 2017 (piggybacked) @ [facility]

## 2022-01-01 NOTE — ED PROVIDER NOTE - AGGRAVATING FACTORS
-- DO NOT REPLY / DO NOT REPLY ALL --  -- Message is from [facility] [facility] [employer] ([facility]) --    General Patient Message Patients mother is requesting for a rx refill for shampoo unsure of the name. Please advise.      Caller Information       Type Contact Phone/Fax    2022 03:41 PM CDT Phone (Incoming) rosamaria perry (Mother) [phone]        Alternative phone number: none    Can a detailed message be left? No    Message Turnaround:     Is it Working Hours? Yes - Working Hours     [place]:    Please give this turnaround time to the caller:   \"This message will be sent to [state Provider's name]. The clinical team will fulfill your request as soon as they review your message.\"                
-- DO NOT REPLY / DO NOT REPLY ALL --  -- Message is from [facility] [employer] ([facility]) --    Referral Request  Name of Specialist: n/a  Provider's specialty: audiology     Medical condition for referral:  Hearing test     Is this a NEW request?: yes      Referral ordered by: Kate Mendoza      Insurance type: [facility] medicaid       Payor: [facility] MEDICAID / Plan: [facility] MEDICAID HMO / Product Type: T19 HMO      Preferred Delivery Method   Call when ready for pickup - phone number to notify: [phone]    Caller Information       Type Contact Phone/Fax    2022 03:41 PM CDT Phone (Incoming) rosamaria perry (Mother) [phone]          Alternative phone number: none    Can a detailed message be left? No    Please give this turnaround time to the caller:   \"This message will be sent to [state Provider's full name]. The clinical team will return your call as soon as they review your message. Typically, it takes 3 business days to process referral requests.\"  
I wrote it down on the paperwork given    It is an over the counter shampoo     Selsun blue    (and I sent a script to her pharmacy on the day of visit) they probably didn't fill it because it is otc.    Please call mom  
Seen in icc yesterday  
walking

## 2022-01-01 NOTE — ED PROVIDER NOTE - MUSCULOSKELETAL MINIMAL EXAM
ms left le:+ left foot 1st digit and MTP joint with swelling. 1st digit and 1st MT/2ND MT TTP rest of extremity nontender from + 2 dp sensation intact/normal range of motion

## 2022-01-01 NOTE — ED ADULT NURSE NOTE - OBJECTIVE STATEMENT
Patient is a 60yo male that kicked a metal file cabinet last night Patient complaining of left foot and great toe pain. Patient has swelling. Patient denies numbness or tingling in effected extremity

## 2022-01-01 NOTE — ED PROVIDER NOTE - PROGRESS NOTE DETAILS
pt with foot fx. gave crutches and advised podiatrist follow up. ibuprofena nd percocet for pain. . All questions answered and concerns addressed. pt verbalized understanding and agreement with plan and dx. pt advised on next step and when/where to follow up. pt advised on all take home and otc medications. pt advised to follow up with PMD. pt advised to return to ed for worsenng symptoms including fever, cp, sob. will dc.

## 2022-01-01 NOTE — ED PROVIDER NOTE - ATTENDING CONTRIBUTION TO CARE
I have personally performed a face to face diagnostic evaluation on this patient.  I have reviewed the PA's note and agree with the history, exam, and plan of care, except as noted.  History and Exam by me shows pt is a 60yo male with pmhx of cad s/p stents and CABG, htn, presents with foot injury. pt reports he hit his left foot against a metal cabinet at home aprox 6am. pt reports pain and swelling first digit. pt reports difficulty ambulating due to pain. pt took ibuprofen which provided relief. denies numbness, tingling. .  Patient is NAD.  A n O x 3. Head NC/AT. Left big toe- diffuse tenderness c no swelling and ? swelling, no ecchymosis, limited rom secondary to pain.  xr- ? fx at MTP joint.   Splinted and fu podiatry.

## 2022-01-01 NOTE — ED PROVIDER NOTE - NSFOLLOWUPINSTRUCTIONS_ED_ALL_ED_FT
1. FOLLOW UP WITH YOUR PRIMARY DOCTOR IN 24-48 HOURS.   2. FOLLOW UP WITH ALL SPECIALIST DISCUSSED DURING YOUR VISIT.   3. TAKE ALL MEDICATIONS PRESCRIBED IN THE ER IF ANY ARE PRESCRIBED. CONTINUE YOUR HOME MEDICATIONS UNLESS OTHERWISE ADVISED DIFFERENTLY.   4. RETURN FOR WORSENING SYMPTOMS OR CONCERNS INCLUDING BUT NOT LIMITED TO FEVER, CHEST PAIN, OR TROUBLE BREATHING OR ANY OTHER CONCERNS  continue ibuprofen for pain. if needed add percocet 1 tablet every 6 hours.              FOOT FRACTURE IN ADULTS - AfterCare(R) Instructions(ER/ED)           Foot Fracture in Adults    WHAT YOU NEED TO KNOW:    A foot fracture is a break in a bone in your foot.    Foot Anatomy         DISCHARGE INSTRUCTIONS:    Call your local emergency number (911 in the [place]) if:   •You suddenly feel lightheaded and short of breath.      •You have chest pain when you take a deep breath or cough.      •You cough up blood.      Return to the emergency department if:   •The pain in your injured foot gets worse even after you rest and take pain medicine.      •The skin or toes of your foot become numb, swollen, cold, white, or blue.      •You have more pain or swelling than you did before a cast was put on.      •Your leg feels warm, tender, and painful. It may look swollen and red.      Call your doctor if:   •You have a fever.      •You have new sores around your boot, cast, or splint.      •You have new or worsening trouble moving your foot.      •You notice a foul smell coming from under your cast.      •Your boot, cast, or splint gets damaged.      •You have questions or concerns about your condition or care.      Medicines: You may need any of the following:   •Antibiotics: This medicine is given to help treat or prevent an infection caused by bacteria.       •NSAIDs, such as ibuprofen, help decrease swelling, pain, and fever. NSAIDs can cause stomach bleeding or kidney problems in certain people. If you take blood thinner medicine, always ask your healthcare provider if NSAIDs are safe for you. Always read the medicine label and follow directions.      •Prescription pain medicine may be given. Ask your healthcare provider how to take this medicine safely. Some prescription pain medicines contain acetaminophen. Do not take other medicines that contain acetaminophen without talking to your healthcare provider. Too much acetaminophen may cause liver damage. Prescription pain medicine may cause constipation. Ask your healthcare provider how to prevent or treat constipation.       •Take your medicine as directed. Contact your healthcare provider if you think your medicine is not helping or if you have side effects. Tell him or her if you are allergic to any medicine. Keep a list of the medicines, vitamins, and herbs you take. Include the amounts, and when and why you take them. Bring the list or the pill bottles to follow-up visits. Carry your medicine list with you in case of an emergency.      Self-care:   •Rest your foot and avoid activities that cause pain.      •Apply ice to decrease swelling and pain, and to prevent tissue damage. Use an ice pack, or put crushed ice in a plastic bag. Cover it with a towel before you apply it. Use ice for 15 to 20 minutes every hour or as directed.      •Elevate your foot above the level of your heart as often as you can. This will help decrease swelling and pain. Prop your foot on pillows or blankets to keep it elevated comfortably.  Elevate Leg           •Physical therapy may be needed when your foot has healed. A physical therapist can teach you exercises to help improve movement and strength, and to decrease pain.      Cast or splint care:   •Ask when it is okay to take a bath or shower. Do not let your cast or splint get wet. Before you bathe, cover the cast or splint with a plastic bag. Tape the bag to your skin above the splint to seal out water. Keep your foot out of the water in case the bag leaks.      •Check the skin around your splint daily for any redness or open areas.      •Do not use a sharp or pointed object to scratch your skin under the splint.      •Do not remove your splint unless your healthcare provider or orthopedic surgeon says it is okay.      Assistive devices: You may be given a hard-soled shoe to wear while your foot is healing. You also may need to use crutches to help you walk while your foot heals. It is important to use your crutches correctly. Ask for more information about how to use crutches.    Follow up with your doctor or bone specialist as directed: You may need to return to have your splint or stitches removed. You may also need to return for tests to make sure your foot is healing. Write down your questions so you remember to ask them during your visits.       © Copyright [employer] 2022           back to top                          © Copyright [employer] 2022

## 2022-01-01 NOTE — ED PROVIDER NOTE - PATIENT PORTAL LINK FT
You can access the FollowMyHealth Patient Portal offered by Health system by registering at the following website: [email or web address]. By joining [employer]’s FollowMyHealth portal, you will also be able to view your health information using other applications (apps) compatible with our system.

## 2022-01-01 NOTE — ED PROVIDER NOTE - CARE PROVIDER_API CALL
Mj Bowens (DPM)  [place] [address]  Phone: [phone]  Fax: [phone]  Follow Up Time: 1-3 Days

## 2022-01-01 NOTE — ED PROVIDER NOTE - OBJECTIVE STATEMENT
pt is a 62yo male with pmhx of cad s/p stents and CABG, htn, presents with foot injury. pt reports he hit his left foot against a metal cabinet at home aprox 6am. pt reports pain and swelling first digit. pt reports difficulty ambulating due to pain. pt took ibuprofen which provided relief. denies numbness, tingling.

## 2022-01-01 NOTE — ED PROVIDER NOTE - CARE PLAN
1 Principal Discharge DX:	Fracture of phalanx of left foot, closed  Secondary Diagnosis:	Metatarsal fracture

## 2022-01-01 NOTE — ED PROVIDER NOTE - CONSTITUTIONAL, MLM
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 Patient previously lived alone and was independent with self-cares and mobility. Patient enjoys caring for home including cooking, cleaning and gardening.    Visit # since seen by OT:  2  ASSESSMENT:   Patient is displaying good progress as evidenced by progressing endurance and balance, progressing gross/fine motor coordination, demonstrated through patient participation in varied NMR through four point positioning at mat table, bridging with facilitation through lyndsey UE at sides, and gross coordination through D1/D2 PNF patterning. Patient with continued gross coordination deficits, however progressive balance noted throughout session. Patient then participated in functional fine motor coordination and strength training through opening varied sized packages. Patient with overall independence with activity.    Performance deficits limiting patient's ability to safely and independently complete ADLs and functional mobility include: decreased strength, balance deficits, diminished safety awareness, poor coordination, decreased activity tolerance, postural problems, decreased endurance.  Further skilled occupational therapy is required to address these limitation in order to maximize the patient's independence.     Focus of today's therapy session:   NMR through gross/fine motor strength and coordination retraining    See below for further details.    Treatment Plan for Next Session:   AM: ADL retraining  PM: DANIALE NMR, sitting vs standing balance, Rt attention training    Precautions:  Fall risk  Activity: As tolerated and up with assist  Rodriguez Fall Scale Score: 65  Alarms:  Patient left up with RN staff    SUBJECTIVE:   Pt.  reported agreeable to therapy    Cognition: Alert and Oriented x4    Pain: Pain Assessment: Within defined limits (06/23/18 1345)  Comfort/Function (Pain) SCORE at Rest: 0 (06/23/18 1345)  Comfort/Function (Pain) SCORE with Activity: 0 (06/23/18 1345)     ADLs:   Eating:  Independent  Grooming:  Not Applicable  Oral Hygiene:  Not Applicable  Toileting:  Not Applicable  Bathing:  Not Applicable  Upper Extremity Dressing:  Not Applicable  Lower Extremity Dressing:  Not Applicable  Footwear:  Not Applicable          THERAPEUTIC EXERCISE:    -Figure four positioning at mat table with UE/LE reaching. Patient unable to achieve contralateral UE/LE reaching secondary to weakness, completed with modified technique  -Hip bridging with UE positioning at side for increased input     THERAPEUTIC ACTIVITIES:    Transfers:   Sit to Stand:  Supervision (Supv)  Stand to Sit:  Supervision (Supv)  Stand pivot:  Touching/Steadying Assistance  Device Used:  gait belt and 2 wheeled walker  Reason for Assistance:  weakness, unsteadiness     ADL Functional Mobility/Ambulation:  Patient completed ADL ambulation with use of 2ww and close supervision secondary to mild balance instabilities     NEUROMUSCULAR RE-EDUCATION:    -gross motor coordination retraining through D1/D2 PNF patterned reaches from seated  -fine motor coordination retraining through opening varied food packages     HOME MANAGEMENT SKILLS:    -not addressed this session     EDUCATION:    Learner: patient  Teaching Content: Transfers, Gait, Safety Awareness, Therapy Plan of Care, Energy Conservation and Home Safety  Please reference the patient education activity for further information regarding the patient's learning assessment.     GOALS:     Review Date: 6/30/18  1. Patient's level of assist with grooming will be Modified independent. Progressing  2. Patient's level of assist with bathing will be Supervision. Goal Met 6/19  3. Patient's level of assist with UE dressing  will be Modified independent. Progressing  4. Patient's level of assist with LE dressing will be Modified independent. Progressing  5. Patient's level of assist with toileting will be Modified independent. Progressing  6. Patient's level of assist with toilet transfers will be Modified independent. Progressing  7. Patient's level of assist with tub/shower transfers will be Supervision. Progressing     DISCHARGE RECOMMENDATIONS:   After today's session this therapist is recommending the following location for optimal discharge:    Recommendations for Discharge: OT: 24 Hour assist, Sub-acute nursing home  OT Identified Barriers to Discharge: lives alone, current level of assist    Equipment for discharge: reacher, grab bars, tub bench, to be determined - continuing to assess needs at this time     PLAN OF CARE:    OT Frequency: Twice a day, 5 days/week  Duration: 3 weeks  Treatment Interventions: ADL retraining, Functional transfer training, UE strengthening/ROM, Endurance training, Cognitive reorientation, Patient/Family training, Equipment eval/education, Neuro muscular reeducation, Fine motor coordination activities, Compensatory technique education,  retraining     OT Time Spent: 45 minutes (06/23/18 0519)   Well appearing, awake, alert, oriented to person, place, time/situation and in no apparent distress. normal...

## 2022-01-03 ENCOUNTER — OUTPATIENT (OUTPATIENT)
Dept: OUTPATIENT SERVICES | Facility: HOSPITAL | Age: 62
LOS: 1 days | Discharge: HOME | End: 2022-01-03
Payer: COMMERCIAL

## 2022-01-03 ENCOUNTER — APPOINTMENT (OUTPATIENT)
Dept: WOUND CARE | Facility: HOSPITAL | Age: 62
End: 2022-01-03
Payer: COMMERCIAL

## 2022-01-03 ENCOUNTER — RESULT REVIEW (OUTPATIENT)
Age: 62
End: 2022-01-03

## 2022-01-03 VITALS
HEART RATE: 85 BPM | OXYGEN SATURATION: 98 % | RESPIRATION RATE: 20 BRPM | TEMPERATURE: 98 F | HEIGHT: 71 IN | DIASTOLIC BLOOD PRESSURE: 76 MMHG | BODY MASS INDEX: 32.06 KG/M2 | WEIGHT: 229 LBS | SYSTOLIC BLOOD PRESSURE: 113 MMHG

## 2022-01-03 DIAGNOSIS — Z95.5 PRESENCE OF CORONARY ANGIOPLASTY IMPLANT AND GRAFT: Chronic | ICD-10-CM

## 2022-01-03 DIAGNOSIS — Z95.1 PRESENCE OF AORTOCORONARY BYPASS GRAFT: Chronic | ICD-10-CM

## 2022-01-03 DIAGNOSIS — I25.10 ATHEROSCLEROTIC HEART DISEASE OF NATIVE CORONARY ARTERY WITHOUT ANGINA PECTORIS: Chronic | ICD-10-CM

## 2022-01-03 DIAGNOSIS — K46.9 UNSPECIFIED ABDOMINAL HERNIA WITHOUT OBSTRUCTION OR GANGRENE: Chronic | ICD-10-CM

## 2022-01-03 DIAGNOSIS — S92.402A DISPLACED UNSPECIFIED FRACTURE OF LEFT GREAT TOE, INITIAL ENCOUNTER FOR CLOSED FRACTURE: ICD-10-CM

## 2022-01-03 DIAGNOSIS — Q15.9 CONGENITAL MALFORMATION OF EYE, UNSPECIFIED: Chronic | ICD-10-CM

## 2022-01-03 PROCEDURE — 99203 OFFICE O/P NEW LOW 30 MIN: CPT

## 2022-01-03 PROCEDURE — G0463: CPT

## 2022-01-04 NOTE — PHYSICAL EXAM
Encounter Date: 1/31/2020       History     Chief Complaint   Patient presents with    Nasal Congestion     Started today.     HPI   Patient is a 15 yo male who presented with congestion, cough and difficulty taking a deep breath for 1 day. Per patient, noted nasal discharge/congestion since this AM. Described nasal discharge as nonbloody, clear. Denied any associated symptoms including fevers, chills, nausea, vomiting, chest or abdominal pain, change in bowel movements or appetite. Reported sibling had a cold at home. Denied any recent travel, food allergies, or recent abx use. Told mom this afternoon it was difficult for him to take a deep breath- denied any wheezing, pallor, rash, or increased work of breathing. Of note, mom reported history of heart murmur as an child but asymptomatic.     VS were noted for mild tachycardia. For difficultly to take deep breath, patient thinks it is related to congestion but reports this sensation has resolved.     Review of patient's allergies indicates:   Allergen Reactions    Penicillins      Past Medical History:   Diagnosis Date    Benign heart murmur      History reviewed. No pertinent surgical history.  History reviewed. No pertinent family history.  Social History     Tobacco Use    Smoking status: Never Smoker    Smokeless tobacco: Never Used   Substance Use Topics    Alcohol use: No    Drug use: Not on file     Review of Systems   Constitutional: Negative for appetite change and fever.   HENT: Positive for congestion and rhinorrhea. Negative for postnasal drip, sinus pressure, sinus pain and sore throat.    Eyes: Negative for pain, discharge and itching.   Respiratory: Positive for cough. Negative for chest tightness, shortness of breath, wheezing and stridor.    Cardiovascular: Negative for chest pain.   Gastrointestinal: Negative for abdominal distention, diarrhea, nausea and vomiting.   Endocrine: Negative for cold intolerance.   Genitourinary: Negative for  decreased urine volume, difficulty urinating and urgency.   Musculoskeletal: Negative for arthralgias and back pain.   Skin: Negative for rash and wound.   Allergic/Immunologic: Negative for environmental allergies.   Neurological: Negative for dizziness and weakness.   Hematological: Negative for adenopathy.   Psychiatric/Behavioral: Negative for agitation.       Physical Exam     Initial Vitals [01/31/20 1727]   BP Pulse Resp Temp SpO2   -- 106 20 98.8 °F (37.1 °C) 95 %      MAP       --         Physical Exam    Constitutional: He appears well-developed and well-nourished.   HENT:   Head: Normocephalic.   Right Ear: External ear normal.   Left Ear: External ear normal.   Nose: Nose normal.   Mouth/Throat: Oropharynx is clear and moist.   Eyes: Conjunctivae and EOM are normal. Pupils are equal, round, and reactive to light.   Neck: Normal range of motion.   Cardiovascular: Normal rate, regular rhythm, normal heart sounds and intact distal pulses.   No murmur heard.  Pulmonary/Chest: Breath sounds normal. No respiratory distress. He has no wheezes. He has no rales.   Coarse breath sounds noted bilateral bases   Abdominal: Soft. Bowel sounds are normal. He exhibits no distension. There is no tenderness.   Musculoskeletal: Normal range of motion.   Neurological: He is alert and oriented to person, place, and time. He has normal strength.   Skin: Skin is warm. Capillary refill takes less than 2 seconds.   Psychiatric: He has a normal mood and affect.         ED Course   Procedures  Labs Reviewed - No data to display       Imaging Results    None          Medical Decision Making:   History:   I obtained history from: someone other than patient.  Old Medical Records: I decided to obtain old medical records.  Initial Assessment:   15 yo male with benign heart murmur who presented with cough, congestion, difficulty to take deep breath for 1 day   Differential Diagnosis:   Viral URI versus pneumonia versus costochondritis         APC / Resident Notes:   17 yo male who presented with viral URI. Patient reports feeling well. Repeat VS noted for improved HR. Patient denied any respiratory symptoms including SOB, difficulty breathing. Discussed warning signs and symptoms with parent. Will need to follow up with Pediatrician within 5 days.          Attending Attestation:   Physician Attestation Statement for Resident:  As the supervising MD   Physician Attestation Statement: I have personally seen and examined this patient.   I agree with the above history. -:   As the supervising MD I agree with the above PE.    As the supervising MD I agree with the above treatment, course, plan, and disposition.   -: Patient seen and examined, feeling better. Denies CP at this time. Exam reassuring, VS reassuring.                                   Clinical Impression:       ICD-10-CM ICD-9-CM   1. Viral URI J06.9 465.9         Disposition:   Disposition: Discharged  Condition: Stable                     Caity Jeronimo MD  Resident  01/31/20 1912       Leena Zimmerman MD  02/01/20 9789     [2+] : left 2+ [Ankle Swelling (On Exam)] : not present [] : not present [Varicose Veins Of Lower Extremities] : not present [de-identified] : A&Ox3, NAD [de-identified] : left proximal phalanx hallux nondisplaced closed fracture, ROM deferred. left 1st proximal phalanx base palpation tender [de-identified] : No open lesions, no ecchymosis, no erythema, no lacerations, no macerations [de-identified] : light touch sensation intact bilaterally [FreeTextEntry1] : Foot - Phalanx fracture [de-identified] : Cleansed with Normal Saline\par  [de-identified] : Circulation:\par \par CIRCULATION\par Dorsalis Pedis: R palpable  L palpable\par Posterior Tibialis: R palpable L palpable\par Extremity Color: Pink\par Extremity Temperature: Warm\par Capillary Refill: < 3 seconds bilaterally\par Vascular studies not ordered by DPM\par  [TWNoteComboBox1] : Left

## 2022-01-04 NOTE — HISTORY OF PRESENT ILLNESS
[FreeTextEntry1] : Patient went to [facility] on 1/1/22 after hitting his toe into a file cabinet. XRays were performed and patient has a fracture to his left hallux/first phalynx.

## 2022-01-04 NOTE — REVIEW OF SYSTEMS
[Fever] : no fever [FreeTextEntry5] : HTN, CAD, HLD [FreeTextEntry9] : left proximal phalanx hallux nondisplaced closed fracture [de-identified] : type 2 diabetes

## 2022-01-04 NOTE — VITALS
[Throbbing] : throbbing [de-identified] : Patient reports pain is 3-4/10 [FreeTextEntry3] : Left foot [FreeTextEntry1] : rest [FreeTextEntry2] : activity [FreeTextEntry4] : rest and elevation at home

## 2022-01-04 NOTE — ASSESSMENT
[Verbal] : Verbal [Patient] : Patient [Good - alert, interested, motivated] : Good - alert, interested, motivated [Verbalizes knowledge/Understanding] : Verbalizes knowledge/understanding [Foot Care] : foot care [Pressure relief] : pressure relief [Signs and symptoms of infection] : sign and symptoms of infection [How and When to Call] : how and when to call [Off-loading] : off-loading [Stable] : stable [Home] : Home [Crutches] : Crutches [Not Applicable - Long Term Care/Home Health Agency] : Long Term Care/Home Health Agency: Not Applicable [] : No [FreeTextEntry2] : Offloading \par Pressure Relief\par Rest\par XRAY [FreeTextEntry3] : Initial visit [FreeTextEntry4] : DPM provided patient with xray to be performed before next visit\par Patient to f/u in 2-3 weeks

## 2022-01-04 NOTE — PLAN
[FreeTextEntry1] : Patient examined and evaluated at this time.\par Discussed the possibility of surgical excision of fragment. Will monitor for healing at this time. All questions answered to satisfaction and patient verbalized understanding. Pt to remain in surgical shoe and to be heel weight bearing with crutches.\par Spent 30 minutes for patient care and medical decision making.\par Patient to follow up in 2 weeks with new radiographs.\par

## 2022-01-05 DIAGNOSIS — Z79.899 OTHER LONG TERM (CURRENT) DRUG THERAPY: ICD-10-CM

## 2022-01-05 DIAGNOSIS — Z79.84 LONG TERM (CURRENT) USE OF ORAL HYPOGLYCEMIC DRUGS: ICD-10-CM
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
## 2022-01-05 DIAGNOSIS — Z98.890 OTHER SPECIFIED POSTPROCEDURAL STATES: ICD-10-CM
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
## 2022-01-05 DIAGNOSIS — S92.415D NONDISPLACED FRACTURE OF PROXIMAL PHALANX OF LEFT GREAT TOE, SUBSEQUENT ENCOUNTER FOR FRACTURE WITH ROUTINE HEALING: ICD-10-CM

## 2022-01-05 DIAGNOSIS — E11.59 TYPE 2 DIABETES MELLITUS WITH OTHER CIRCULATORY COMPLICATIONS: ICD-10-CM

## 2022-01-05 DIAGNOSIS — Z95.5 PRESENCE OF CORONARY ANGIOPLASTY IMPLANT AND GRAFT: ICD-10-CM

## 2022-01-05 DIAGNOSIS — Z79.82 LONG TERM (CURRENT) USE OF ASPIRIN: ICD-10-CM
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
## 2022-01-05 DIAGNOSIS — Z82.49 FAMILY HISTORY OF ISCHEMIC HEART DISEASE AND OTHER DISEASES OF THE CIRCULATORY SYSTEM: ICD-10-CM

## 2022-01-05 DIAGNOSIS — I11.9 HYPERTENSIVE HEART DISEASE WITHOUT HEART FAILURE: ICD-10-CM
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
## 2022-01-24 ENCOUNTER — APPOINTMENT (OUTPATIENT)
Dept: WOUND CARE | Facility: HOSPITAL | Age: 62
End: 2022-01-24
Payer: COMMERCIAL

## 2022-01-24 ENCOUNTER — OUTPATIENT (OUTPATIENT)
Dept: OUTPATIENT SERVICES | Facility: HOSPITAL | Age: 62
LOS: 1 days | Discharge: HOME | End: 2022-01-24
Payer: COMMERCIAL

## 2022-01-24 ENCOUNTER — RESULT REVIEW (OUTPATIENT)
Age: 62
End: 2022-01-24

## 2022-01-24 ENCOUNTER — OUTPATIENT (OUTPATIENT)
Dept: OUTPATIENT SERVICES | Facility: HOSPITAL | Age: 62
LOS: 1 days | End: 2022-01-24
Payer: COMMERCIAL

## 2022-01-24 VITALS
WEIGHT: 229 LBS | BODY MASS INDEX: 32.06 KG/M2 | RESPIRATION RATE: 20 BRPM | OXYGEN SATURATION: 98 % | TEMPERATURE: 98.3 F | HEIGHT: 71 IN | HEART RATE: 91 BPM | DIASTOLIC BLOOD PRESSURE: 67 MMHG | SYSTOLIC BLOOD PRESSURE: 109 MMHG

## 2022-01-24 DIAGNOSIS — K46.9 UNSPECIFIED ABDOMINAL HERNIA WITHOUT OBSTRUCTION OR GANGRENE: Chronic | ICD-10-CM

## 2022-01-24 DIAGNOSIS — Z79.82 LONG TERM (CURRENT) USE OF ASPIRIN: ICD-10-CM

## 2022-01-24 DIAGNOSIS — Z95.5 PRESENCE OF CORONARY ANGIOPLASTY IMPLANT AND GRAFT: ICD-10-CM

## 2022-01-24 DIAGNOSIS — I25.10 ATHEROSCLEROTIC HEART DISEASE OF NATIVE CORONARY ARTERY WITHOUT ANGINA PECTORIS: Chronic | ICD-10-CM
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
## 2022-01-24 DIAGNOSIS — Z82.49 FAMILY HISTORY OF ISCHEMIC HEART DISEASE AND OTHER DISEASES OF THE CIRCULATORY SYSTEM: ICD-10-CM
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
## 2022-01-24 DIAGNOSIS — S92.402A DISPLACED UNSPECIFIED FRACTURE OF LEFT GREAT TOE, INITIAL ENCOUNTER FOR CLOSED FRACTURE: ICD-10-CM

## 2022-01-24 DIAGNOSIS — M79.672 PAIN IN LEFT FOOT: ICD-10-CM

## 2022-01-24 DIAGNOSIS — I11.9 HYPERTENSIVE HEART DISEASE WITHOUT HEART FAILURE: ICD-10-CM

## 2022-01-24 DIAGNOSIS — E11.59 TYPE 2 DIABETES MELLITUS WITH OTHER CIRCULATORY COMPLICATIONS: ICD-10-CM

## 2022-01-24 DIAGNOSIS — Z95.5 PRESENCE OF CORONARY ANGIOPLASTY IMPLANT AND GRAFT: Chronic | ICD-10-CM

## 2022-01-24 DIAGNOSIS — Z79.84 LONG TERM (CURRENT) USE OF ORAL HYPOGLYCEMIC DRUGS: ICD-10-CM

## 2022-01-24 DIAGNOSIS — Z95.1 PRESENCE OF AORTOCORONARY BYPASS GRAFT: Chronic | ICD-10-CM

## 2022-01-24 DIAGNOSIS — S92.415D NONDISPLACED FRACTURE OF PROXIMAL PHALANX OF LEFT GREAT TOE, SUBSEQUENT ENCOUNTER FOR FRACTURE WITH ROUTINE HEALING: ICD-10-CM
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
## 2022-01-24 DIAGNOSIS — Z79.899 OTHER LONG TERM (CURRENT) DRUG THERAPY: ICD-10-CM

## 2022-01-24 DIAGNOSIS — Q15.9 CONGENITAL MALFORMATION OF EYE, UNSPECIFIED: Chronic | ICD-10-CM

## 2022-01-24 DIAGNOSIS — Z98.890 OTHER SPECIFIED POSTPROCEDURAL STATES: ICD-10-CM
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
## 2022-01-24 PROCEDURE — 73630 X-RAY EXAM OF FOOT: CPT | Mod: 26,LT

## 2022-01-24 PROCEDURE — G0463: CPT

## 2022-01-24 PROCEDURE — 99213 OFFICE O/P EST LOW 20 MIN: CPT

## 2022-01-24 PROCEDURE — 73630 X-RAY EXAM OF FOOT: CPT

## 2022-01-24 NOTE — PLAN
[FreeTextEntry1] : Patient examined and evaluated at this time.\par Radiographs reviewed with patient.\par Will monitor for healing at this time. All questions answered to satisfaction and patient verbalized understanding. Pt to transition to regular shoes as tolerated.\par Spent 20 minutes for patient care and medical decision making.\par Patient to follow up in 2 weeks with new radiographs.\par

## 2022-01-24 NOTE — REVIEW OF SYSTEMS
[Fever] : no fever [FreeTextEntry5] : HTN, CAD, HLD [FreeTextEntry9] : left proximal phalanx hallux nondisplaced closed fracture [de-identified] : type 2 diabetes

## 2022-01-24 NOTE — HISTORY OF PRESENT ILLNESS
[FreeTextEntry1] : Pt seen for follow up of left hallux proximal phalanx fracture. Pt relates that he is doing well at this time and notes that his pain and swelling have decreased. Pt relates that he has been walking in the surgical shoe and notes that he has intermittently tried to wear regular sneakers. Pt denies any other complaints at this time. none

## 2022-01-24 NOTE — ASSESSMENT
[Verbal] : Verbal [Written] : Written [Patient] : Patient [Good - alert, interested, motivated] : Good - alert, interested, motivated [Foot Care] : foot care [Skin Care] : skin care [Signs and symptoms of infection] : sign and symptoms of infection [How and When to Call] : how and when to call [Labs and Tests] : labs and tests [Off-loading] : off-loading [Patient responsibility to plan of care] : patient responsibility to plan of care [Stable] : stable [Home] : Home [Ambulatory] : Ambulatory [Not Applicable - Long Term Care/Home Health Agency] : Long Term Care/Home Health Agency: Not Applicable [] : No [FreeTextEntry2] : Promote optimal bone integrity\par  [FreeTextEntry4] : Xray done today - DPM discussed result with patient.\par DPM instructed patient to transition from surgical shoe to comfortable fitted sneaker.\par Xray to be done one hour before next appointment.\par f/u 3 weeks

## 2022-01-24 NOTE — PHYSICAL EXAM
[2+] : left 2+ [Ankle Swelling (On Exam)] : not present [Varicose Veins Of Lower Extremities] : not present [] : not present [de-identified] : A&Ox3, NAD [de-identified] : left proximal phalanx hallux nondisplaced closed fracture, ROM nontender. left 1st proximal phalanx base palpation not tender [de-identified] : No open lesions, no ecchymosis, no erythema, no lacerations, no macerations [de-identified] : light touch sensation intact bilaterally [FreeTextEntry1] : Left hallux fracture

## 2022-02-10 ENCOUNTER — APPOINTMENT (OUTPATIENT)
Dept: WOUND CARE | Facility: HOSPITAL | Age: 62
End: 2022-02-10
Payer: COMMERCIAL

## 2022-02-10 ENCOUNTER — OUTPATIENT (OUTPATIENT)
Dept: OUTPATIENT SERVICES | Facility: HOSPITAL | Age: 62
LOS: 1 days | Discharge: HOME | End: 2022-02-10
Payer: COMMERCIAL

## 2022-02-10 VITALS
SYSTOLIC BLOOD PRESSURE: 123 MMHG | TEMPERATURE: 98.3 F | DIASTOLIC BLOOD PRESSURE: 75 MMHG | HEART RATE: 90 BPM | OXYGEN SATURATION: 100 %

## 2022-02-10 DIAGNOSIS — Q15.9 CONGENITAL MALFORMATION OF EYE, UNSPECIFIED: Chronic | ICD-10-CM

## 2022-02-10 DIAGNOSIS — Z95.1 PRESENCE OF AORTOCORONARY BYPASS GRAFT: Chronic | ICD-10-CM

## 2022-02-10 DIAGNOSIS — S92.402A DISPLACED UNSPECIFIED FRACTURE OF LEFT GREAT TOE, INITIAL ENCOUNTER FOR CLOSED FRACTURE: ICD-10-CM

## 2022-02-10 DIAGNOSIS — K46.9 UNSPECIFIED ABDOMINAL HERNIA WITHOUT OBSTRUCTION OR GANGRENE: Chronic | ICD-10-CM

## 2022-02-10 DIAGNOSIS — Z95.5 PRESENCE OF CORONARY ANGIOPLASTY IMPLANT AND GRAFT: Chronic | ICD-10-CM

## 2022-02-10 DIAGNOSIS — I25.10 ATHEROSCLEROTIC HEART DISEASE OF NATIVE CORONARY ARTERY WITHOUT ANGINA PECTORIS: Chronic | ICD-10-CM

## 2022-02-10 PROCEDURE — 73630 X-RAY EXAM OF FOOT: CPT

## 2022-02-10 PROCEDURE — 99213 OFFICE O/P EST LOW 20 MIN: CPT

## 2022-02-10 PROCEDURE — 73630 X-RAY EXAM OF FOOT: CPT | Mod: 26,LT

## 2022-02-10 PROCEDURE — G0463: CPT

## 2022-02-10 NOTE — PHYSICAL EXAM
[2+] : left 2+ [Ankle Swelling (On Exam)] : not present [Varicose Veins Of Lower Extremities] : not present [] : not present [de-identified] : A&Ox3, NAD [de-identified] : left proximal phalanx hallux nondisplaced closed fracture, ROM nontender. left 1st proximal phalanx base palpation not tender [de-identified] : No open lesions, no ecchymosis, no erythema, no lacerations, no macerations [de-identified] : light touch sensation intact bilaterally [FreeTextEntry1] : Left hallux fracture - resolved [de-identified] : no product [TWNoteComboBox6] : Surgical

## 2022-02-10 NOTE — PLAN
[FreeTextEntry1] : Patient examined and evaluated at this time.\par Radiographs reviewed with patient.\par Pt to continue in regular shoes as tolerated.\par Pt happy with outcome of treatment.\par Spent 20 minutes for patient care and medical decision making.\par

## 2022-02-10 NOTE — REVIEW OF SYSTEMS
[Fever] : no fever [FreeTextEntry5] : HTN, CAD, HLD [FreeTextEntry9] : left proximal phalanx hallux nondisplaced closed fracture [de-identified] : type 2 diabetes

## 2022-02-10 NOTE — ASSESSMENT
[Verbal] : Verbal [Written] : Written [Patient] : Patient [Good - alert, interested, motivated] : Good - alert, interested, motivated [Demonstrates independently] : demonstrates independently [Foot Care] : foot care [Skin Care] : skin care [Signs and symptoms of infection] : sign and symptoms of infection [How and When to Call] : how and when to call [Labs and Tests] : labs and tests [Off-loading] : off-loading [Patient responsibility to plan of care] : patient responsibility to plan of care [Stable] : stable [Home] : Home [Ambulatory] : Ambulatory [Not Applicable - Long Term Care/Home Health Agency] : Long Term Care/Home Health Agency: Not Applicable [] : No [FreeTextEntry2] : Infection prevention \par Maintain optimal skin integrity to high pressure areas\par Xray [FreeTextEntry4] : Xray done prior to today's assessment. DPM reviewed Xray with Pt.\par Pt discharged. \par

## 2022-02-10 NOTE — HISTORY OF PRESENT ILLNESS
[FreeTextEntry1] : Pt seen for follow up of left hallux proximal phalanx fracture. Pt relates that he is doing well at this time and notes that his pain and swelling is resolved. Pt relates that he has been walking in regular shoegear. Pt denies any other complaints at this time.
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
## 2022-02-13 DIAGNOSIS — S92.415D NONDISPLACED FRACTURE OF PROXIMAL PHALANX OF LEFT GREAT TOE, SUBSEQUENT ENCOUNTER FOR FRACTURE WITH ROUTINE HEALING: ICD-10-CM

## 2022-02-13 DIAGNOSIS — Z79.82 LONG TERM (CURRENT) USE OF ASPIRIN: ICD-10-CM
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
## 2022-02-13 DIAGNOSIS — Z98.890 OTHER SPECIFIED POSTPROCEDURAL STATES: ICD-10-CM

## 2022-02-13 DIAGNOSIS — E11.59 TYPE 2 DIABETES MELLITUS WITH OTHER CIRCULATORY COMPLICATIONS: ICD-10-CM

## 2022-02-13 DIAGNOSIS — I11.9 HYPERTENSIVE HEART DISEASE WITHOUT HEART FAILURE: ICD-10-CM

## 2022-02-13 DIAGNOSIS — Z79.84 LONG TERM (CURRENT) USE OF ORAL HYPOGLYCEMIC DRUGS: ICD-10-CM

## 2022-02-13 DIAGNOSIS — Z79.899 OTHER LONG TERM (CURRENT) DRUG THERAPY: ICD-10-CM
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
## 2022-02-13 DIAGNOSIS — Z95.5 PRESENCE OF CORONARY ANGIOPLASTY IMPLANT AND GRAFT: ICD-10-CM

## 2022-02-13 DIAGNOSIS — Z82.49 FAMILY HISTORY OF ISCHEMIC HEART DISEASE AND OTHER DISEASES OF THE CIRCULATORY SYSTEM: ICD-10-CM

## 2022-03-10 ENCOUNTER — APPOINTMENT (OUTPATIENT)
Dept: CARDIOLOGY | Facility: CLINIC | Age: 62
End: 2022-03-10
Payer: COMMERCIAL

## 2022-03-10 ENCOUNTER — NON-APPOINTMENT (OUTPATIENT)
Age: 62
End: 2022-03-10

## 2022-03-10 VITALS
DIASTOLIC BLOOD PRESSURE: 85 MMHG | HEART RATE: 95 BPM | OXYGEN SATURATION: 95 % | WEIGHT: 221 LBS | SYSTOLIC BLOOD PRESSURE: 124 MMHG | BODY MASS INDEX: 30.94 KG/M2 | HEIGHT: 71 IN

## 2022-03-10 DIAGNOSIS — M79.672 PAIN IN LEFT FOOT: ICD-10-CM

## 2022-03-10 PROCEDURE — 99213 OFFICE O/P EST LOW 20 MIN: CPT

## 2022-03-10 PROCEDURE — 93000 ELECTROCARDIOGRAM COMPLETE: CPT

## 2022-03-10 NOTE — CARDIOLOGY SUMMARY
[de-identified] : \par 03/10/22 - normal sinus rhythm, nonspecific ST abnormality\par  [de-identified] : \par 08/28/19 (exercise ECG) - 6 METS, 93% MPHR, 05:41 min, no CP, no ECG abnormalities, Carballo score 5\par 01/12/17 (exercise myoview) - 6 METS, 96% MPHR, 06:01 min, Duke score 6, medium sized, mild to moderate defects in inferior and inferolateral walls that are reversible, suggestive of ischemia, LVEF 54% with normal wall motion\par  [de-identified] : \par 08/28/18 - normal LA, normal LV and RV size and function, LVEF 67%\par  [de-identified] : \par 09/05/18 (PCI) - POBA pSVG-OM1 80% ISR ->30%\par 09/05/18 (CATH) - dLM 20%, mLAD 100%, pCx 60%, oOM1 100%, mRCA 30%, pRPLS 60%, patent LIMA-LAD, patent SVG-D1 y graft from LIMA, SVG-OM1 p80% ISR, p40%, m50%, d60%\par 01/19/17 (PCI) - PROMUS PREMIER stents to SVG-OM1 o90% and p80%\par 04/30/14 (PCI) - PROMUS PREMIER stent to pSVG-OM1 80%\par  [de-identified] : \par 3/2005 (CABG @[facility]) - triple bypass

## 2022-03-10 NOTE — DISCUSSION/SUMMARY
[Coronary Artery Disease] : coronary artery disease [Hypertension] : hypertension [Stable] : stable [FreeTextEntry1] : \par Currently stable from a cardiovascular standpoint. Normotensive. Euvolemic. Stable CAD (s/p CABG, s/p multiple stents) with preserved LV systolic function. No ischemic or CHF symptoms. Continue current medications. ECG completed today and reviewed. Follow up in 4 months.

## 2022-06-23 NOTE — H&P PST ADULT - VENOUS THROMBOEMBOLISM AGE
Problem: Plan of Care - These are the overarching goals to be used throughout the patient stay.    Goal: Plan of Care Review/Shift Note  Description: The Plan of Care Review/Shift note should be completed every shift.  The Outcome Evaluation is a brief statement about your assessment that the patient is improving, declining, or no change.  This information will be displayed automatically on your shift note.  Outcome: Ongoing, Progressing  Flowsheets (Taken 6/23/2022 0323)  Plan of Care Reviewed With: patient  Goal: Optimal Comfort and Wellbeing  6/23/2022 0323 by Monique Alvarado RN  Outcome: Ongoing, Progressing  6/22/2022 2157 by Monique Alvarado RN  Outcome: Ongoing, Progressing  Intervention: Monitor Pain and Promote Comfort  Recent Flowsheet Documentation  Taken 6/22/2022 2140 by Monique Alvarado RN  Pain Management Interventions: medication (see MAR)  Taken 6/22/2022 2054 by Monique Alvarado RN  Pain Management Interventions: medication (see MAR)     Problem: Hypertensive Disorders in Pregnancy  Goal: Maternal-Fetal Stabilization  6/23/2022 0323 by Monique Alvarado RN  Outcome: Ongoing, Progressing  6/22/2022 2157 by Monique Alvarado RN  Outcome: Met     Problem: Pain Acute  Goal: Acceptable Pain Control and Functional Ability  6/23/2022 0323 by Monique Alvarado RN  Outcome: Ongoing, Progressing  6/22/2022 2157 by Monique Alvarado RN  Outcome: Ongoing, Progressing  Intervention: Develop Pain Management Plan  Recent Flowsheet Documentation  Taken 6/22/2022 2140 by Monique Alvarado RN  Pain Management Interventions: medication (see MAR)  Taken 6/22/2022 2054 by Monique Alvarado RN  Pain Management Interventions: medication (see MAR)  Intervention: Optimize Psychosocial Wellbeing  Recent Flowsheet Documentation  Taken 6/23/2022 0250 by Monique Alvarado RN  Supportive Measures:   active listening utilized   relaxation techniques promoted   self-care encouraged  Taken 6/22/2022   by Monique Alvarado RN  Supportive Measures:   active listening utilized   self-care encouraged   relaxation techniques promoted     Problem: Adjustment to Role Transition (Postpartum  Delivery)  Goal: Successful Maternal Role Transition  2022 0323 by Monique Alvarado RN  Outcome: Ongoing, Progressing  2022 by Monique Alvarado RN  Outcome: Ongoing, Progressing  Intervention: Support Maternal Role Transition  Recent Flowsheet Documentation  Taken 2022 0250 by Monique Alvarado RN  Supportive Measures:   active listening utilized   relaxation techniques promoted   self-care encouraged  Taken 2022 193 by Monique Alvarado RN  Supportive Measures:   active listening utilized   self-care encouraged   relaxation techniques promoted     Problem: Bleeding (Postpartum  Delivery)  Goal: Hemostasis  Outcome: Ongoing, Progressing     Problem: Pain (Postpartum  Delivery)  Goal: Acceptable Pain Control  Outcome: Ongoing, Progressing  Intervention: Prevent or Manage Pain  Recent Flowsheet Documentation  Taken 2022 by Monique Alvarado RN  Pain Management Interventions: medication (see MAR)  Taken 2022 by Monique Alvarado RN  Pain Management Interventions: medication (see MAR)     Problem: Postoperative Nausea and Vomiting (Postpartum  Delivery)  Goal: Nausea and Vomiting Relief  Outcome: Ongoing, Progressing     Problem: Postoperative Urinary Retention (Postpartum  Delivery)  Goal: Effective Urinary Elimination  Outcome: Met     Problem: Plan of Care - These are the overarching goals to be used throughout the patient stay.    Goal: Absence of Hospital-Acquired Illness or Injury  Intervention: Prevent and Manage VTE (Venous Thromboembolism) Risk  Recent Flowsheet Documentation  Taken 2022 0250 by Monique Alvarado RN  VTE Prevention/Management: SCDs (sequential compression devices) off  Activity Management: up ad  sapna  Taken 6/22/2022 2305 by Monique Alvarado RN  VTE Prevention/Management: SCDs (sequential compression devices) on  Activity Management: up ad sapna  Taken 6/22/2022 1935 by Monique Alvarado RN  VTE Prevention/Management: SCDs (sequential compression devices) off  Activity Management: up ad sapna      40-59 yrs

## 2022-07-05 ENCOUNTER — EMERGENCY (EMERGENCY)
Facility: HOSPITAL | Age: 62
LOS: 1 days | Discharge: HOME | End: 2022-07-05
Attending: EMERGENCY MEDICINE | Admitting: EMERGENCY MEDICINE
Payer: COMMERCIAL

## 2022-07-05 VITALS
SYSTOLIC BLOOD PRESSURE: 150 MMHG | WEIGHT: 220.02 LBS | DIASTOLIC BLOOD PRESSURE: 94 MMHG | HEART RATE: 104 BPM | RESPIRATION RATE: 18 BRPM | TEMPERATURE: 98 F | OXYGEN SATURATION: 97 % | HEIGHT: 71 IN

## 2022-07-05 VITALS
OXYGEN SATURATION: 97 % | DIASTOLIC BLOOD PRESSURE: 88 MMHG | HEART RATE: 84 BPM | TEMPERATURE: 99 F | SYSTOLIC BLOOD PRESSURE: 130 MMHG | RESPIRATION RATE: 20 BRPM

## 2022-07-05 DIAGNOSIS — K46.9 UNSPECIFIED ABDOMINAL HERNIA WITHOUT OBSTRUCTION OR GANGRENE: Chronic | ICD-10-CM

## 2022-07-05 DIAGNOSIS — Z95.5 PRESENCE OF CORONARY ANGIOPLASTY IMPLANT AND GRAFT: Chronic | ICD-10-CM

## 2022-07-05 DIAGNOSIS — I25.10 ATHEROSCLEROTIC HEART DISEASE OF NATIVE CORONARY ARTERY WITHOUT ANGINA PECTORIS: Chronic | ICD-10-CM

## 2022-07-05 DIAGNOSIS — Q15.9 CONGENITAL MALFORMATION OF EYE, UNSPECIFIED: Chronic | ICD-10-CM

## 2022-07-05 DIAGNOSIS — Z95.1 PRESENCE OF AORTOCORONARY BYPASS GRAFT: Chronic | ICD-10-CM

## 2022-07-05 LAB
ALBUMIN SERPL ELPH-MCNC: 4.3 G/DL — SIGNIFICANT CHANGE UP (ref 3.3–5)
ALP SERPL-CCNC: 120 U/L — SIGNIFICANT CHANGE UP (ref 30–120)
ALT FLD-CCNC: 36 U/L DA — SIGNIFICANT CHANGE UP (ref 10–60)
ANION GAP SERPL CALC-SCNC: 12 MMOL/L — SIGNIFICANT CHANGE UP (ref 5–17)
APPEARANCE UR: CLEAR — SIGNIFICANT CHANGE UP
APTT BLD: 28.5 SEC — SIGNIFICANT CHANGE UP (ref 27.5–35.5)
AST SERPL-CCNC: 29 U/L — SIGNIFICANT CHANGE UP (ref 10–40)
BACTERIA # UR AUTO: ABNORMAL
BASOPHILS # BLD AUTO: 0.03 K/UL — SIGNIFICANT CHANGE UP (ref 0–0.2)
BASOPHILS NFR BLD AUTO: 0.2 % — SIGNIFICANT CHANGE UP (ref 0–2)
BILIRUB SERPL-MCNC: 0.6 MG/DL — SIGNIFICANT CHANGE UP (ref 0.2–1.2)
BILIRUB UR-MCNC: NEGATIVE — SIGNIFICANT CHANGE UP
BUN SERPL-MCNC: 18 MG/DL — SIGNIFICANT CHANGE UP (ref 7–23)
CALCIUM SERPL-MCNC: 10.2 MG/DL — SIGNIFICANT CHANGE UP (ref 8.4–10.5)
CHLORIDE SERPL-SCNC: 103 MMOL/L — SIGNIFICANT CHANGE UP (ref 96–108)
CO2 SERPL-SCNC: 23 MMOL/L — SIGNIFICANT CHANGE UP (ref 22–31)
COLOR SPEC: YELLOW — SIGNIFICANT CHANGE UP
CREAT SERPL-MCNC: 1.27 MG/DL — SIGNIFICANT CHANGE UP (ref 0.5–1.3)
DIFF PNL FLD: ABNORMAL
EGFR: 65 ML/MIN/1.73M2 — SIGNIFICANT CHANGE UP
EOSINOPHIL # BLD AUTO: 0.04 K/UL — SIGNIFICANT CHANGE UP (ref 0–0.5)
EOSINOPHIL NFR BLD AUTO: 0.3 % — SIGNIFICANT CHANGE UP (ref 0–6)
EPI CELLS # UR: SIGNIFICANT CHANGE UP
FLUAV AG NPH QL: SIGNIFICANT CHANGE UP
FLUBV AG NPH QL: SIGNIFICANT CHANGE UP
GLUCOSE SERPL-MCNC: 122 MG/DL — HIGH (ref 70–99)
GLUCOSE UR QL: 1000 MG/DL
HCT VFR BLD CALC: 43.9 % — SIGNIFICANT CHANGE UP (ref 39–50)
HGB BLD-MCNC: 15.3 G/DL — SIGNIFICANT CHANGE UP (ref 13–17)
IMM GRANULOCYTES NFR BLD AUTO: 0.4 % — SIGNIFICANT CHANGE UP (ref 0–1.5)
INR BLD: 1.09 RATIO — SIGNIFICANT CHANGE UP (ref 0.88–1.16)
KETONES UR-MCNC: NEGATIVE — SIGNIFICANT CHANGE UP
LEUKOCYTE ESTERASE UR-ACNC: NEGATIVE — SIGNIFICANT CHANGE UP
LYMPHOCYTES # BLD AUTO: 1.98 K/UL — SIGNIFICANT CHANGE UP (ref 1–3.3)
LYMPHOCYTES # BLD AUTO: 16.2 % — SIGNIFICANT CHANGE UP (ref 13–44)
MCHC RBC-ENTMCNC: 30.4 PG — SIGNIFICANT CHANGE UP (ref 27–34)
MCHC RBC-ENTMCNC: 34.9 GM/DL — SIGNIFICANT CHANGE UP (ref 32–36)
MCV RBC AUTO: 87.3 FL — SIGNIFICANT CHANGE UP (ref 80–100)
MONOCYTES # BLD AUTO: 0.59 K/UL — SIGNIFICANT CHANGE UP (ref 0–0.9)
MONOCYTES NFR BLD AUTO: 4.8 % — SIGNIFICANT CHANGE UP (ref 2–14)
NEUTROPHILS # BLD AUTO: 9.51 K/UL — HIGH (ref 1.8–7.4)
NEUTROPHILS NFR BLD AUTO: 78.1 % — HIGH (ref 43–77)
NITRITE UR-MCNC: NEGATIVE — SIGNIFICANT CHANGE UP
NRBC # BLD: 0 /100 WBCS — SIGNIFICANT CHANGE UP (ref 0–0)
PH UR: 5 — SIGNIFICANT CHANGE UP (ref 5–8)
PLATELET # BLD AUTO: 212 K/UL — SIGNIFICANT CHANGE UP (ref 150–400)
POTASSIUM SERPL-MCNC: 3.7 MMOL/L — SIGNIFICANT CHANGE UP (ref 3.5–5.3)
POTASSIUM SERPL-SCNC: 3.7 MMOL/L — SIGNIFICANT CHANGE UP (ref 3.5–5.3)
PROT SERPL-MCNC: 8.5 G/DL — HIGH (ref 6–8.3)
PROT UR-MCNC: 30 MG/DL
PROTHROM AB SERPL-ACNC: 12.5 SEC — SIGNIFICANT CHANGE UP (ref 10.5–13.4)
RBC # BLD: 5.03 M/UL — SIGNIFICANT CHANGE UP (ref 4.2–5.8)
RBC # FLD: 13.1 % — SIGNIFICANT CHANGE UP (ref 10.3–14.5)
RBC CASTS # UR COMP ASSIST: SIGNIFICANT CHANGE UP /HPF (ref 0–4)
RSV RNA NPH QL NAA+NON-PROBE: SIGNIFICANT CHANGE UP
SARS-COV-2 RNA SPEC QL NAA+PROBE: SIGNIFICANT CHANGE UP
SODIUM SERPL-SCNC: 138 MMOL/L — SIGNIFICANT CHANGE UP (ref 135–145)
SP GR SPEC: 1.02 — SIGNIFICANT CHANGE UP (ref 1.01–1.02)
TROPONIN I, HIGH SENSITIVITY RESULT: 4.4 NG/L — SIGNIFICANT CHANGE UP
UROBILINOGEN FLD QL: NEGATIVE MG/DL — SIGNIFICANT CHANGE UP
WBC # BLD: 12.2 K/UL — HIGH (ref 3.8–10.5)
WBC # FLD AUTO: 12.2 K/UL — HIGH (ref 3.8–10.5)
WBC UR QL: SIGNIFICANT CHANGE UP

## 2022-07-05 PROCEDURE — 99285 EMERGENCY DEPT VISIT HI MDM: CPT | Mod: 25

## 2022-07-05 PROCEDURE — 81001 URINALYSIS AUTO W/SCOPE: CPT

## 2022-07-05 PROCEDURE — 96374 THER/PROPH/DIAG INJ IV PUSH: CPT | Mod: XU

## 2022-07-05 PROCEDURE — 80053 COMPREHEN METABOLIC PANEL: CPT

## 2022-07-05 PROCEDURE — 84484 ASSAY OF TROPONIN QUANT: CPT

## 2022-07-05 PROCEDURE — 71275 CT ANGIOGRAPHY CHEST: CPT | Mod: 26,MA

## 2022-07-05 PROCEDURE — 85025 COMPLETE CBC W/AUTO DIFF WBC: CPT

## 2022-07-05 PROCEDURE — 87637 SARSCOV2&INF A&B&RSV AMP PRB: CPT

## 2022-07-05 PROCEDURE — 85610 PROTHROMBIN TIME: CPT

## 2022-07-05 PROCEDURE — 71045 X-RAY EXAM CHEST 1 VIEW: CPT | Mod: 26

## 2022-07-05 PROCEDURE — 93005 ELECTROCARDIOGRAM TRACING: CPT

## 2022-07-05 PROCEDURE — 71275 CT ANGIOGRAPHY CHEST: CPT | Mod: MA

## 2022-07-05 PROCEDURE — 71045 X-RAY EXAM CHEST 1 VIEW: CPT

## 2022-07-05 PROCEDURE — 93010 ELECTROCARDIOGRAM REPORT: CPT

## 2022-07-05 PROCEDURE — 85730 THROMBOPLASTIN TIME PARTIAL: CPT

## 2022-07-05 PROCEDURE — 99285 EMERGENCY DEPT VISIT HI MDM: CPT

## 2022-07-05 PROCEDURE — 36415 COLL VENOUS BLD VENIPUNCTURE: CPT

## 2022-07-05 RX ORDER — SODIUM CHLORIDE 9 MG/ML
1000 INJECTION INTRAMUSCULAR; INTRAVENOUS; SUBCUTANEOUS ONCE
Refills: 0 | Status: COMPLETED | OUTPATIENT
Start: 2022-07-05 | End: 2022-07-05

## 2022-07-05 RX ORDER — CHOLECALCIFEROL (VITAMIN D3) 125 MCG
1 CAPSULE ORAL
Qty: 0 | Refills: 0 | DISCHARGE

## 2022-07-05 RX ORDER — ONDANSETRON 8 MG/1
4 TABLET, FILM COATED ORAL ONCE
Refills: 0 | Status: COMPLETED | OUTPATIENT
Start: 2022-07-05 | End: 2022-07-05

## 2022-07-05 RX ORDER — ICOSAPENT ETHYL 500 MG/1
2 CAPSULE, LIQUID FILLED ORAL
Qty: 0 | Refills: 0 | DISCHARGE

## 2022-07-05 RX ORDER — ONDANSETRON 8 MG/1
1 TABLET, FILM COATED ORAL
Qty: 15 | Refills: 0
Start: 2022-07-05 | End: 2022-07-09

## 2022-07-05 RX ORDER — CHOLECALCIFEROL (VITAMIN D3) 125 MCG
2 CAPSULE ORAL
Qty: 0 | Refills: 0 | DISCHARGE

## 2022-07-05 RX ADMIN — ONDANSETRON 4 MILLIGRAM(S): 8 TABLET, FILM COATED ORAL at 20:28

## 2022-07-05 RX ADMIN — SODIUM CHLORIDE 1000 MILLILITER(S): 9 INJECTION INTRAMUSCULAR; INTRAVENOUS; SUBCUTANEOUS at 16:27

## 2022-07-05 NOTE — ED PROVIDER NOTE - NSFOLLOWUPINSTRUCTIONS_ED_ALL_ED_FT
Nonspecific Chest Pain      Chest pain can be caused by many different conditions. Some causes of chest pain can be life-threatening. These will require treatment right away. Serious causes of chest pain include:  •Heart attack.      •A tear in the body's main blood vessel.      •Redness and swelling (inflammation) around your heart.      •Blood clot in your lungs.      Other causes of chest pain may not be so serious. These include:  •Heartburn.      •Anxiety or stress.      •Damage to bones or muscles in your chest.      •Lung infections.      Chest pain can feel like:  •Pain or discomfort in your chest.      •Crushing, pressure, aching, or squeezing pain.       •Burning or tingling.       •Dull or sharp pain that is worse when you move, cough, or take a deep breath.       •Pain or discomfort that is also felt in your back, neck, jaw, shoulder, or arm, or pain that spreads to any of these areas.      It is hard to know whether your pain is caused by something that is serious or something that is not so serious. So it is important to see your doctor right away if you have chest pain.      Follow these instructions at home:    Medicines     •Take over-the-counter and prescription medicines only as told by your doctor.      •If you were prescribed an antibiotic medicine, take it as told by your doctor. Do not stop taking the antibiotic even if you start to feel better.        Lifestyle   A plate along with examples of foods in a healthy diet.   •Rest as told by your doctor.      • Do not use any products that contain nicotine or tobacco, such as cigarettes, e-cigarettes, and chewing tobacco. If you need help quitting, ask your doctor.      • Do not drink alcohol.    •Make lifestyle changes as told by your doctor. These may include:  •Getting regular exercise. Ask your doctor what activities are safe for you.      •Eating a heart-healthy diet. A diet and nutrition specialist (dietitian) can help you to learn healthy eating options.      •Staying at a healthy weight.      •Treating diabetes or high blood pressure, if needed.      •Lowering your stress. Activities such as yoga and relaxation techniques can help.        General instructions     •Pay attention to any changes in your symptoms. Tell your doctor about them or any new symptoms.      •Avoid any activities that cause chest pain.      •Keep all follow-up visits as told by your doctor. This is important. You may need more testing if your chest pain does not go away.        Contact a doctor if:    •Your chest pain does not go away.      •You feel depressed.      •You have a fever.        Get help right away if:    •Your chest pain is worse.      •You have a cough that gets worse, or you cough up blood.      •You have very bad (severe) pain in your belly (abdomen).      •You pass out (faint).    •You have either of these for no clear reason:  •Sudden chest discomfort.      •Sudden discomfort in your arms, back, neck, or jaw.        •You have shortness of breath at any time.      •You suddenly start to sweat, or your skin gets clammy.      •You feel sick to your stomach (nauseous).      •You throw up (vomit).      •You suddenly feel lightheaded or dizzy.      •You feel very weak or tired.      •Your heart starts to beat fast, or it feels like it is skipping beats.      These symptoms may be an emergency. Do not wait to see if the symptoms will go away. Get medical help right away. Call your local emergency services (911 in the [place] Do not drive yourself to the hospital.       Summary    •Chest pain can be caused by many different conditions. The cause may be serious and need treatment right away. If you have chest pain, see your doctor right away.      •Follow your doctor's instructions for taking medicines and making lifestyle changes.       •Keep all follow-up visits as told by your doctor. This includes visits for any further testing if your chest pain does not go away.      •Be sure to know the signs that show that your condition has become worse. Get help right away if you have these symptoms.      This information is not intended to replace advice given to you by your health care provider. Make sure you discuss any questions you have with your health care provider.

## 2022-07-05 NOTE — ED PROVIDER NOTE - PATIENT PORTAL LINK FT
You can access the FollowMyHealth Patient Portal offered by [facility] by registering at the following website: [email or web address]. By joining [employer]’s FollowMyHealth portal, you will also be able to view your health information using other applications (apps) compatible with our system.

## 2022-07-05 NOTE — ED PROVIDER NOTE - NSICDXPASTSURGICALHX_GEN_ALL_CORE_FT
PAST SURGICAL HISTORY:  CAD (Coronary Artery Disease) of Artery Bypass Graft 2005 - March 17th    CAD (Coronary Artery Disease), Nonautologous Biological Bypass Graft     CAD S/P percutaneous coronary angioplasty baloon angioplasty Sep 2018    Eye abnormality left eye amblyopia repaired in 1994    Hernia repaired    Hernia, Inguinal     Hypercholesterolemia     Pneumonia due to Organism     S/P CABG x 3 2005  3vCABG 2005 (LIMA to LAD, SVG to OM, left radial to Diag)   NYQ    Status post coronary artery stent placement Cardiac Stent Placed April 30th 2014 @ [facility]  Cardiac Stent X 2 placed January 15th 2017 (piggybacked) @ [facility]

## 2022-07-05 NOTE — ED PROVIDER NOTE - OBJECTIVE STATEMENT
61 y/o male with PMHx of CAD (2 stents 2014, 2017, CABGx3 2005, Cath/PCI Poba balloon 2018), Covid-19, chronic back pain, arthritis, pre-diabetes, diverticulosis, anxiety, HLD, HTN presents to the ED c/o lightheadedness, nausea, sweatiness. Pt states over the past 4-5 days, he has been feeling nauseas and sweaty/clammy. States today, he started feeling lightheaded and generally unwell as well, so came to the ED for evaluation. Pt states he had a "pinching" chest pain yesterday, but not today. Denies cough, SOB, abdominal pain, vomiting, diarrhea. Not vaccinated against Covid-19. Cardiologist: Dr. Medina at [facility].

## 2022-07-05 NOTE — ED ADULT NURSE NOTE - CHPI ED NUR SYMPTOMS NEG
no back pain/no chills/no congestion/no dizziness/no fever/no shortness of breath/no syncope/no vomiting

## 2022-07-05 NOTE — ED PROVIDER NOTE - CARE PROVIDER_API CALL
Jameel Medina (MD)  Cardiovascular Disease; Internal Medicine; Interventional Cardiology  [phone] [place] [address], [address]  [place] [address]  Phone: [phone]  Fax: [phone]  Established Patient  Follow Up Time: Urgent

## 2022-07-05 NOTE — ED PROVIDER NOTE - CLINICAL SUMMARY MEDICAL DECISION MAKING FREE TEXT BOX
63 y/o male with Hx of CAD c/o not feeling well for few days, pinching chest pain yesterday but not today. Physical exam normal. Plan for EKG, labs, and reassess.

## 2022-07-14 ENCOUNTER — APPOINTMENT (OUTPATIENT)
Dept: CARDIOLOGY | Facility: CLINIC | Age: 62
End: 2022-07-14

## 2022-07-28 NOTE — ASU PREOP CHECKLIST - BMI (KG/M2)
Detail Level: Detailed Quality 130: Documentation Of Current Medications In The Medical Record: Current Medications Documented 31.7

## 2022-11-01 NOTE — ED ADULT NURSE NOTE - NSICDXPASTSURGICALHX_GEN_ALL_CORE_FT
No difficulties
PAST SURGICAL HISTORY:  CAD (Coronary Artery Disease) of Artery Bypass Graft 2005 - March 17th    CAD (Coronary Artery Disease), Nonautologous Biological Bypass Graft     CAD S/P percutaneous coronary angioplasty baloon angioplasty Sep 2018    Eye abnormality left eye amblyopia repaired in 1994    Hernia repaired    Hernia, Inguinal     Hypercholesterolemia     Pneumonia due to Organism     S/P CABG x 3 2005  3vCABG 2005 (LIMA to LAD, SVG to OM, left radial to Diag)   NYQ    Status post coronary artery stent placement Cardiac Stent Placed April 30th 2014 @ [facility]  Cardiac Stent X 2 placed January 15th 2017 (piggybacked) @ [facility]

## 2022-11-23 ENCOUNTER — NON-APPOINTMENT (OUTPATIENT)
Age: 62
End: 2022-11-23

## 2022-11-23 ENCOUNTER — APPOINTMENT (OUTPATIENT)
Dept: CARDIOLOGY | Facility: CLINIC | Age: 62
End: 2022-11-23

## 2022-11-23 VITALS
BODY MASS INDEX: 30.66 KG/M2 | WEIGHT: 219 LBS | SYSTOLIC BLOOD PRESSURE: 132 MMHG | TEMPERATURE: 98.3 F | DIASTOLIC BLOOD PRESSURE: 84 MMHG | HEART RATE: 99 BPM | HEIGHT: 71 IN | OXYGEN SATURATION: 100 %

## 2022-11-23 PROCEDURE — 93000 ELECTROCARDIOGRAM COMPLETE: CPT

## 2022-11-23 PROCEDURE — 99214 OFFICE O/P EST MOD 30 MIN: CPT | Mod: 25

## 2022-11-23 RX ORDER — CARIPRAZINE 1.5 MG/1
1.5 CAPSULE, GELATIN COATED ORAL
Qty: 90 | Refills: 0 | Status: ACTIVE | COMMUNITY
Start: 2022-11-18

## 2022-11-23 RX ORDER — OMEPRAZOLE 40 MG/1
40 CAPSULE, DELAYED RELEASE ORAL
Qty: 60 | Refills: 0 | Status: ACTIVE | COMMUNITY
Start: 2022-07-01

## 2022-11-23 NOTE — CARDIOLOGY SUMMARY
[de-identified] : \par 11/23/22 - normal sinus rhythm, consider old anteroseptal infarct\par  [de-identified] : \par 08/28/19 (exercise ECG) - 6 METS, 93% MPHR, 05:41 min, no CP, no ECG abnormalities, Carballo score 5\par 01/12/17 (exercise myoview) - 6 METS, 96% MPHR, 06:01 min, Duke score 6, medium sized, mild to moderate defects in inferior and inferolateral walls that are reversible, suggestive of ischemia, LVEF 54% with normal wall motion\par  [de-identified] : \par 08/28/18 - normal LA, normal LV and RV size and function, LVEF 67%\par  [de-identified] : \par 09/05/18 (PCI) - POBA pSVG-OM1 80% ISR ->30%\par 09/05/18 (CATH) - dLM 20%, mLAD 100%, pCx 60%, oOM1 100%, mRCA 30%, pRPLS 60%, patent LIMA-LAD, patent SVG-D1 y graft from LIMA, SVG-OM1 p80% ISR, p40%, m50%, d60%\par 01/19/17 (PCI) - PROMUS PREMIER stents to SVG-OM1 o90% and p80%\par 04/30/14 (PCI) - PROMUS PREMIER stent to pSVG-OM1 80%\par  [de-identified] : \par 3/2005 (CABG @[facility]) - triple bypass

## 2022-11-23 NOTE — DISCUSSION/SUMMARY
[Coronary Artery Disease] : coronary artery disease [Hypertension] : hypertension [Unlikely Cardiac Ischemia (Low Prob.)] : chest pain unlikely to represent cardiac ischemia (low probability) [Stable] : stable [FreeTextEntry1] : \par Currently stable from a cardiovascular standpoint. Normotensive. Euvolemic. Stable CAD (s/p CABG, s/p multiple stents) with preserved LV systolic function. Atypical chest pains. Likely musculoskeletal in origin. Occasional SOB of unclear etiology. Consider physical conditioning. Continue current medications. ECG completed today and reviewed. Will schedule an exercise nuclear stress test to rule out any significant ischemia. In addition, will schedule an echocardiogram to reassess his cardiac structures and function. Pending the test results, I will make further recommendations. Follow up in 4 months. [EKG obtained to assist in diagnosis and management of assessed problem(s)] : EKG obtained to assist in diagnosis and management of assessed problem(s)

## 2022-11-23 NOTE — HISTORY OF PRESENT ILLNESS
[FreeTextEntry1] : Occasional episodes of chest pains unrelated to exertion. Also notes occasional shortness of breath after talking or after exertion. Feels extra heart beats at times. Has been stressed as his brother passed away suddenly recently.

## 2022-11-23 NOTE — REVIEW OF SYSTEMS
[Negative] : Musculoskeletal [Dyspnea on exertion] : dyspnea during exertion [Chest Discomfort] : chest discomfort [Lower Ext Edema] : no extremity edema [Leg Claudication] : no intermittent leg claudication [Palpitations] : palpitations [Orthopnea] : no orthopnea [PND] : no PND [Syncope] : no syncope

## 2022-12-13 ENCOUNTER — APPOINTMENT (OUTPATIENT)
Dept: CV DIAGNOSITCS | Facility: HOSPITAL | Age: 62
End: 2022-12-13

## 2022-12-13 ENCOUNTER — OUTPATIENT (OUTPATIENT)
Dept: OUTPATIENT SERVICES | Facility: HOSPITAL | Age: 62
LOS: 1 days | End: 2022-12-13

## 2022-12-13 ENCOUNTER — APPOINTMENT (OUTPATIENT)
Dept: CV DIAGNOSTICS | Facility: HOSPITAL | Age: 62
End: 2022-12-13

## 2022-12-13 DIAGNOSIS — Q15.9 CONGENITAL MALFORMATION OF EYE, UNSPECIFIED: Chronic | ICD-10-CM

## 2022-12-13 DIAGNOSIS — K46.9 UNSPECIFIED ABDOMINAL HERNIA WITHOUT OBSTRUCTION OR GANGRENE: Chronic | ICD-10-CM

## 2022-12-13 DIAGNOSIS — I25.10 ATHEROSCLEROTIC HEART DISEASE OF NATIVE CORONARY ARTERY WITHOUT ANGINA PECTORIS: Chronic | ICD-10-CM

## 2022-12-13 DIAGNOSIS — I25.10 ATHEROSCLEROTIC HEART DISEASE OF NATIVE CORONARY ARTERY WITHOUT ANGINA PECTORIS: ICD-10-CM

## 2022-12-13 DIAGNOSIS — Z95.5 PRESENCE OF CORONARY ANGIOPLASTY IMPLANT AND GRAFT: Chronic | ICD-10-CM

## 2022-12-13 DIAGNOSIS — Z95.1 PRESENCE OF AORTOCORONARY BYPASS GRAFT: Chronic | ICD-10-CM

## 2022-12-13 PROCEDURE — 78452 HT MUSCLE IMAGE SPECT MULT: CPT | Mod: 26,MH

## 2022-12-13 PROCEDURE — 93306 TTE W/DOPPLER COMPLETE: CPT | Mod: 26

## 2022-12-13 PROCEDURE — 93018 CV STRESS TEST I&R ONLY: CPT | Mod: GC

## 2022-12-13 PROCEDURE — 93016 CV STRESS TEST SUPVJ ONLY: CPT | Mod: GC

## 2022-12-21 NOTE — ED ADULT NURSE NOTE - DOES PATIENT HAVE ADVANCE DIRECTIVE
"Chief Complaint   Patient presents with    Chest Pain     Arrives with c/o mid line chest pain/SOB/cough that began a few days ago  Chest pain is constant 2-3/10 and will intermittently increase in pain  Sent by  for EKG changes   Endorses fatigue, n/v, no diarrhea      /72   Pulse (!) 102   Temp 37.2 °C (98.9 °F) (Temporal)   Resp 16   Ht 1.651 m (5' 5\")   Wt 110 kg (242 lb 1 oz)   SpO2 95%   BMI 40.28 kg/m²     Pt made aware of triage process, placed back into lobby, educated pt to tell staff of any worsening of symptoms     "
No
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
## 2023-03-30 ENCOUNTER — APPOINTMENT (OUTPATIENT)
Dept: CARDIOLOGY | Facility: CLINIC | Age: 63
End: 2023-03-30
Payer: COMMERCIAL

## 2023-03-30 ENCOUNTER — NON-APPOINTMENT (OUTPATIENT)
Age: 63
End: 2023-03-30

## 2023-03-30 VITALS
TEMPERATURE: 97.9 F | HEIGHT: 71 IN | BODY MASS INDEX: 31.92 KG/M2 | HEART RATE: 90 BPM | SYSTOLIC BLOOD PRESSURE: 125 MMHG | OXYGEN SATURATION: 96 % | WEIGHT: 228 LBS | DIASTOLIC BLOOD PRESSURE: 72 MMHG

## 2023-03-30 DIAGNOSIS — U07.1 COVID-19: ICD-10-CM

## 2023-03-30 DIAGNOSIS — Z87.898 PERSONAL HISTORY OF OTHER SPECIFIED CONDITIONS: ICD-10-CM

## 2023-03-30 DIAGNOSIS — S92.415D NONDISPLACED FRACTURE OF PROXIMAL PHALANX OF LEFT GREAT TOE, SUBSEQUENT ENCOUNTER FOR FRACTURE WITH ROUTINE HEALING: ICD-10-CM

## 2023-03-30 PROCEDURE — 93000 ELECTROCARDIOGRAM COMPLETE: CPT

## 2023-03-30 PROCEDURE — 99214 OFFICE O/P EST MOD 30 MIN: CPT | Mod: 25

## 2023-04-02 PROBLEM — U07.1 COVID-19 VIRUS INFECTION: Status: RESOLVED | Noted: 2022-03-10 | Resolved: 2023-04-02

## 2023-04-02 PROBLEM — Z87.898 HISTORY OF PALPITATIONS: Status: RESOLVED | Noted: 2021-10-07 | Resolved: 2023-04-02

## 2023-04-02 PROBLEM — S92.415D CLOSED NONDISPLACED FRACTURE OF PROXIMAL PHALANX OF LEFT GREAT TOE WITH ROUTINE HEALING: Status: RESOLVED | Noted: 2022-01-04 | Resolved: 2023-04-02

## 2023-04-02 NOTE — DISCUSSION/SUMMARY
[Unlikely Cardiac Ischemia (Low Prob.)] : chest pain unlikely to represent cardiac ischemia (low probability) [Coronary Artery Disease] : coronary artery disease [Hypertension] : hypertension [Stable] : stable [EKG obtained to assist in diagnosis and management of assessed problem(s)] : EKG obtained to assist in diagnosis and management of assessed problem(s) [FreeTextEntry1] : \par Currently stable from a cardiovascular standpoint. Normotensive. Euvolemic. Stable CAD (s/p CABG, s/p multiple stents) with preserved LV systolic function. Atypical chest pains. Likely musculoskeletal in origin. Continue current medications including aspirin, Brilinta, and Crestor. ECG completed today and reviewed. Follow up in 4 months.

## 2023-04-02 NOTE — CARDIOLOGY SUMMARY
[de-identified] : \par 03/30/23 - normal sinus rhythm, consider old anteroseptal infarct\par  [de-identified] : \par 12/13/22 (exercise MIBI) - 6 METS, 106% MPHR, 06:01 min, Duke score 6, no evidence of infarction or inducible ischemia, LVEF 56%\par 08/28/19 (exercise ECG) - 6 METS, 93% MPHR, 05:41 min, no CP, no ECG abnormalities, Carballo score 5\par 01/12/17 (exercise myoview) - 6 METS, 96% MPHR, 06:01 min, Duke score 6, medium sized, mild to moderate defects in inferior and inferolateral walls that are reversible, suggestive of ischemia, LVEF 54% with normal wall motion\par  [de-identified] : \par 12/13/22 - normal LA, normal LV and RV size and function, LVEF 67%\par 08/28/18 - normal LA, normal LV and RV size and function, LVEF 67%\par  [de-identified] : \par 3/2005 (CABG @[facility]) - triple bypass [de-identified] : \par 09/05/18 (PCI) - POBA pSVG-OM1 80% ISR ->30%\par 09/05/18 (CATH) - dLM 20%, mLAD 100%, pCx 60%, oOM1 100%, mRCA 30%, pRPLS 60%, patent LIMA-LAD, patent SVG-D1 y graft from LIMA, SVG-OM1 p80% ISR, p40%, m50%, d60%\par 01/19/17 (PCI) - PROMUS PREMIER stents to SVG-OM1 o90% and p80%\par 04/30/14 (PCI) - PROMUS PREMIER stent to pSVG-OM1 80%\par

## 2023-04-02 NOTE — HISTORY OF PRESENT ILLNESS
[FreeTextEntry1] : Currently doing well. Occasional brief episode of chest pains unrelated to exertion. Denies shortness of breath or palpitations.

## 2023-04-28 ENCOUNTER — NON-APPOINTMENT (OUTPATIENT)
Age: 63
End: 2023-04-28

## 2023-07-16 ENCOUNTER — EMERGENCY (EMERGENCY)
Facility: HOSPITAL | Age: 63
LOS: 1 days | Discharge: HOME | End: 2023-07-16
Attending: EMERGENCY MEDICINE | Admitting: EMERGENCY MEDICINE
Payer: COMMERCIAL

## 2023-07-16 VITALS
OXYGEN SATURATION: 98 % | WEIGHT: 225.31 LBS | RESPIRATION RATE: 19 BRPM | HEIGHT: 70 IN | TEMPERATURE: 99 F | HEART RATE: 113 BPM | DIASTOLIC BLOOD PRESSURE: 79 MMHG | SYSTOLIC BLOOD PRESSURE: 117 MMHG

## 2023-07-16 DIAGNOSIS — Q15.9 CONGENITAL MALFORMATION OF EYE, UNSPECIFIED: Chronic | ICD-10-CM

## 2023-07-16 DIAGNOSIS — Z95.1 PRESENCE OF AORTOCORONARY BYPASS GRAFT: Chronic | ICD-10-CM

## 2023-07-16 DIAGNOSIS — I25.10 ATHEROSCLEROTIC HEART DISEASE OF NATIVE CORONARY ARTERY WITHOUT ANGINA PECTORIS: Chronic | ICD-10-CM

## 2023-07-16 DIAGNOSIS — Z95.5 PRESENCE OF CORONARY ANGIOPLASTY IMPLANT AND GRAFT: Chronic | ICD-10-CM

## 2023-07-16 DIAGNOSIS — K46.9 UNSPECIFIED ABDOMINAL HERNIA WITHOUT OBSTRUCTION OR GANGRENE: Chronic | ICD-10-CM

## 2023-07-16 LAB
ALBUMIN SERPL ELPH-MCNC: 4.3 G/DL — SIGNIFICANT CHANGE UP (ref 3.3–5)
ALP SERPL-CCNC: 108 U/L — SIGNIFICANT CHANGE UP (ref 30–120)
ALT FLD-CCNC: 34 U/L DA — SIGNIFICANT CHANGE UP (ref 10–60)
ANION GAP SERPL CALC-SCNC: 11 MMOL/L — SIGNIFICANT CHANGE UP (ref 5–17)
APPEARANCE UR: CLEAR — SIGNIFICANT CHANGE UP
APTT BLD: 25.6 SEC — LOW (ref 27.5–35.5)
AST SERPL-CCNC: 27 U/L — SIGNIFICANT CHANGE UP (ref 10–40)
BASOPHILS # BLD AUTO: 0.03 K/UL — SIGNIFICANT CHANGE UP (ref 0–0.2)
BASOPHILS NFR BLD AUTO: 0.3 % — SIGNIFICANT CHANGE UP (ref 0–2)
BILIRUB SERPL-MCNC: 0.8 MG/DL — SIGNIFICANT CHANGE UP (ref 0.2–1.2)
BILIRUB UR-MCNC: NEGATIVE — SIGNIFICANT CHANGE UP
BUN SERPL-MCNC: 18 MG/DL — SIGNIFICANT CHANGE UP (ref 7–23)
CALCIUM SERPL-MCNC: 9.9 MG/DL — SIGNIFICANT CHANGE UP (ref 8.4–10.5)
CHLORIDE SERPL-SCNC: 104 MMOL/L — SIGNIFICANT CHANGE UP (ref 96–108)
CO2 SERPL-SCNC: 23 MMOL/L — SIGNIFICANT CHANGE UP (ref 22–31)
COLOR SPEC: YELLOW — SIGNIFICANT CHANGE UP
CREAT SERPL-MCNC: 1.24 MG/DL — SIGNIFICANT CHANGE UP (ref 0.5–1.3)
DIFF PNL FLD: ABNORMAL
EGFR: 66 ML/MIN/1.73M2 — SIGNIFICANT CHANGE UP
EOSINOPHIL # BLD AUTO: 0.03 K/UL — SIGNIFICANT CHANGE UP (ref 0–0.5)
EOSINOPHIL NFR BLD AUTO: 0.3 % — SIGNIFICANT CHANGE UP (ref 0–6)
GLUCOSE SERPL-MCNC: 107 MG/DL — HIGH (ref 70–99)
GLUCOSE UR QL: >=1000 MG/DL
HCT VFR BLD CALC: 42.6 % — SIGNIFICANT CHANGE UP (ref 39–50)
HGB BLD-MCNC: 14.7 G/DL — SIGNIFICANT CHANGE UP (ref 13–17)
IMM GRANULOCYTES NFR BLD AUTO: 0.3 % — SIGNIFICANT CHANGE UP (ref 0–0.9)
INR BLD: 1.02 RATIO — SIGNIFICANT CHANGE UP (ref 0.88–1.16)
KETONES UR-MCNC: 15 MG/DL
LACTATE SERPL-SCNC: 0.9 MMOL/L — SIGNIFICANT CHANGE UP (ref 0.7–2)
LEUKOCYTE ESTERASE UR-ACNC: NEGATIVE — SIGNIFICANT CHANGE UP
LIDOCAIN IGE QN: 149 U/L — SIGNIFICANT CHANGE UP (ref 73–393)
LYMPHOCYTES # BLD AUTO: 2.25 K/UL — SIGNIFICANT CHANGE UP (ref 1–3.3)
LYMPHOCYTES # BLD AUTO: 20.5 % — SIGNIFICANT CHANGE UP (ref 13–44)
MCHC RBC-ENTMCNC: 30.9 PG — SIGNIFICANT CHANGE UP (ref 27–34)
MCHC RBC-ENTMCNC: 34.5 GM/DL — SIGNIFICANT CHANGE UP (ref 32–36)
MCV RBC AUTO: 89.5 FL — SIGNIFICANT CHANGE UP (ref 80–100)
MONOCYTES # BLD AUTO: 0.53 K/UL — SIGNIFICANT CHANGE UP (ref 0–0.9)
MONOCYTES NFR BLD AUTO: 4.8 % — SIGNIFICANT CHANGE UP (ref 2–14)
NEUTROPHILS # BLD AUTO: 8.08 K/UL — HIGH (ref 1.8–7.4)
NEUTROPHILS NFR BLD AUTO: 73.8 % — SIGNIFICANT CHANGE UP (ref 43–77)
NITRITE UR-MCNC: NEGATIVE — SIGNIFICANT CHANGE UP
NRBC # BLD: 0 /100 WBCS — SIGNIFICANT CHANGE UP (ref 0–0)
PH UR: 5 — SIGNIFICANT CHANGE UP (ref 5–8)
PLATELET # BLD AUTO: 192 K/UL — SIGNIFICANT CHANGE UP (ref 150–400)
POTASSIUM SERPL-MCNC: 3.8 MMOL/L — SIGNIFICANT CHANGE UP (ref 3.5–5.3)
POTASSIUM SERPL-SCNC: 3.8 MMOL/L — SIGNIFICANT CHANGE UP (ref 3.5–5.3)
PROT SERPL-MCNC: 7.6 G/DL — SIGNIFICANT CHANGE UP (ref 6–8.3)
PROT UR-MCNC: NEGATIVE MG/DL — SIGNIFICANT CHANGE UP
PROTHROM AB SERPL-ACNC: 12 SEC — SIGNIFICANT CHANGE UP (ref 10.5–13.4)
RBC # BLD: 4.76 M/UL — SIGNIFICANT CHANGE UP (ref 4.2–5.8)
RBC # FLD: 13.2 % — SIGNIFICANT CHANGE UP (ref 10.3–14.5)
SODIUM SERPL-SCNC: 138 MMOL/L — SIGNIFICANT CHANGE UP (ref 135–145)
SP GR SPEC: 1.04 — HIGH (ref 1–1.03)
TROPONIN I, HIGH SENSITIVITY RESULT: 5.5 NG/L — SIGNIFICANT CHANGE UP
UROBILINOGEN FLD QL: 0.2 MG/DL — SIGNIFICANT CHANGE UP (ref 0.2–1)
WBC # BLD: 10.95 K/UL — HIGH (ref 3.8–10.5)
WBC # FLD AUTO: 10.95 K/UL — HIGH (ref 3.8–10.5)

## 2023-07-16 PROCEDURE — 93010 ELECTROCARDIOGRAM REPORT: CPT

## 2023-07-16 PROCEDURE — 99285 EMERGENCY DEPT VISIT HI MDM: CPT

## 2023-07-16 PROCEDURE — 71045 X-RAY EXAM CHEST 1 VIEW: CPT | Mod: 26

## 2023-07-16 RX ORDER — SODIUM CHLORIDE 9 MG/ML
1000 INJECTION INTRAMUSCULAR; INTRAVENOUS; SUBCUTANEOUS ONCE
Refills: 0 | Status: COMPLETED | OUTPATIENT
Start: 2023-07-16 | End: 2023-07-16

## 2023-07-16 RX ORDER — ONDANSETRON 8 MG/1
4 TABLET, FILM COATED ORAL ONCE
Refills: 0 | Status: COMPLETED | OUTPATIENT
Start: 2023-07-16 | End: 2023-07-16

## 2023-07-16 RX ORDER — ALPRAZOLAM 0.25 MG
0.25 TABLET ORAL ONCE
Refills: 0 | Status: DISCONTINUED | OUTPATIENT
Start: 2023-07-16 | End: 2023-07-16

## 2023-07-16 RX ADMIN — ONDANSETRON 4 MILLIGRAM(S): 8 TABLET, FILM COATED ORAL at 23:08

## 2023-07-16 RX ADMIN — Medication 0.25 MILLIGRAM(S): at 23:25

## 2023-07-16 RX ADMIN — SODIUM CHLORIDE 1000 MILLILITER(S): 9 INJECTION INTRAMUSCULAR; INTRAVENOUS; SUBCUTANEOUS at 22:45

## 2023-07-16 RX ADMIN — SODIUM CHLORIDE 1000 MILLILITER(S): 9 INJECTION INTRAMUSCULAR; INTRAVENOUS; SUBCUTANEOUS at 23:46

## 2023-07-16 NOTE — ED PROVIDER NOTE - PATIENT PORTAL LINK FT
You can access the FollowMyHealth Patient Portal offered by [facility] by registering at the following website: [email or web address]. By joining [email or web address]’s FollowMyHealth portal, you will also be able to view your health information using other applications (apps) compatible with our system.

## 2023-07-16 NOTE — ED ADULT NURSE NOTE - NSSUHOSCREENINGYN_ED_ALL_ED
Yes - the patient is able to be screened Benzoyl Peroxide Pregnancy And Lactation Text: This medication is Pregnancy Category C. It is unknown if benzoyl peroxide is excreted in breast milk.

## 2023-07-16 NOTE — ED ADULT NURSE NOTE - NSICDXPASTSURGICALHX_GEN_ALL_CORE_FT
PAST SURGICAL HISTORY:  CAD (Coronary Artery Disease) of Artery Bypass Graft 2005 - March 17th    CAD (Coronary Artery Disease), Nonautologous Biological Bypass Graft     CAD S/P percutaneous coronary angioplasty baloon angioplasty Sep 2018    Eye abnormality left eye amblyopia repaired in 1994    Hernia repaired    Hernia, Inguinal     Hypercholesterolemia     Pneumonia due to Organism     S/P CABG x 3 2005  3vCABG 2005 (LIMA to LAD, SVG to OM, left radial to Diag)   NYQ    Status post coronary artery stent placement Cardiac Stent Placed April 30th 2014 @ [facility]  Cardiac Stent X 2 placed January 15th 2017 (piggybacked) @ [facility]

## 2023-07-16 NOTE — ED ADULT TRIAGE NOTE - HEIGHT IN INCHES
Chief Complaint  No chief complaint on file.    Subjective    History of Present Illness {CC  Problem List  Visit Diagnosis   Encounters  Notes  Medications  Labs  Result Review Imaging  Media :23}     Soledad Ram presents to [facility] for   History of Present Illness     Objective   Vital Signs:   There were no vitals taken for this visit.    Physical Exam   Result Review :{ Labs  Result Review  Imaging  Med Tab  Media :23}   {The following data was reviewed by (Optional):33050}  {Ambulatory Labs (Optional):14527}  {Data reviewed (Optional):38541:::1}          Assessment and Plan {CC Problem List  Visit Diagnosis  ROS  Review (Popup)  Health Maintenance  Quality  BestPractice  Medications  SmartSets  SnapShot Encounters  Media :23}   Problem List Items Addressed This Visit     None        {Time Spent (Optional):16779}  Follow Up {Instructions Charge Capture  Follow-up Communications :23}  No follow-ups on file.  Patient was given instructions and counseling regarding her condition or for health maintenance advice. Please see specific information pulled into the AVS if appropriate.        10

## 2023-07-16 NOTE — ED PROVIDER NOTE - CLINICAL SUMMARY MEDICAL DECISION MAKING FREE TEXT BOX
Patient with 4 to 5 days of nausea, fatigue, sweats.  Has not measured a fever at home.  No sick contacts.  Patient is clammy but looks otherwise well.  No abdominal tenderness EKG shows no acute changes.  We will get labs give IV fluids and reassess.

## 2023-07-16 NOTE — ED PROVIDER NOTE - OBJECTIVE STATEMENT
62 y/o male with PMHx CAD presents today due to nausea and weakness x 4-5 days. pt reports he feels fatigued. pt admits to cough, chills, sweating. pt concerned about covid/RSV. Has not measured a fever at home.  No sick contacts.  pt denies sob, vomiting, sore throat, recent travel, sick contacts, abd pain, chest pain, rash, or any other complaints.

## 2023-07-16 NOTE — ED ADULT TRIAGE NOTE - CHIEF COMPLAINT QUOTE
I have nausea x couple days, feeling lightheaded, anxiety and no appetite x couple days. denies abdominal pain, denies chest pain
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
## 2023-07-16 NOTE — ED PROVIDER NOTE - NSICDXPASTSURGICALHX_GEN_ALL_CORE_FT
PAST SURGICAL HISTORY:  CAD (Coronary Artery Disease) of Artery Bypass Graft 2005 - March 17th    CAD (Coronary Artery Disease), Nonautologous Biological Bypass Graft     CAD S/P percutaneous coronary angioplasty baloon angioplasty Sep 2018    Eye abnormality left eye amblyopia repaired in 1994    Hernia repaired    Hernia, Inguinal     Hypercholesterolemia     Pneumonia due to Organism     S/P CABG x 3 2005  3vCABG 2005 (LIMA to LAD, SVG to OM, left radial to Diag)   NYQ    Status post coronary artery stent placement Cardiac Stent Placed April 30th 2014 @ [facility]  Cardiac Stent X 2 placed January 15th 2017 (piggybacked) @ [facility]

## 2023-07-16 NOTE — ED ADULT NURSE NOTE - OBJECTIVE STATEMENT
I have nausea x couple days, feeling lightheaded, anxiety and no appetite x couple days. denies abdominal pain, denies chest pain.

## 2023-07-16 NOTE — ED PROVIDER NOTE - ATTENDING APP SHARED VISIT CONTRIBUTION OF CARE
Radames Aggarwal MD: I have personally performed a face to face diagnostic evaluation on this patient.  I have reviewed the PA note and agree with the history, exam, and plan of care, except as noted.  History and Exam by me shows same findings as documented

## 2023-07-17 ENCOUNTER — TRANSCRIPTION ENCOUNTER (OUTPATIENT)
Age: 63
End: 2023-07-17

## 2023-07-17 VITALS
SYSTOLIC BLOOD PRESSURE: 127 MMHG | RESPIRATION RATE: 18 BRPM | OXYGEN SATURATION: 95 % | DIASTOLIC BLOOD PRESSURE: 87 MMHG | TEMPERATURE: 98 F | HEART RATE: 83 BPM

## 2023-07-17 LAB
RAPID RVP RESULT: SIGNIFICANT CHANGE UP
SARS-COV-2 RNA SPEC QL NAA+PROBE: SIGNIFICANT CHANGE UP

## 2023-07-17 PROCEDURE — 0225U NFCT DS DNA&RNA 21 SARSCOV2: CPT

## 2023-07-17 PROCEDURE — 71045 X-RAY EXAM CHEST 1 VIEW: CPT

## 2023-07-17 PROCEDURE — 96361 HYDRATE IV INFUSION ADD-ON: CPT

## 2023-07-17 PROCEDURE — 83690 ASSAY OF LIPASE: CPT

## 2023-07-17 PROCEDURE — 87040 BLOOD CULTURE FOR BACTERIA: CPT

## 2023-07-17 PROCEDURE — 80053 COMPREHEN METABOLIC PANEL: CPT

## 2023-07-17 PROCEDURE — 85610 PROTHROMBIN TIME: CPT

## 2023-07-17 PROCEDURE — 84484 ASSAY OF TROPONIN QUANT: CPT

## 2023-07-17 PROCEDURE — 81001 URINALYSIS AUTO W/SCOPE: CPT

## 2023-07-17 PROCEDURE — 85730 THROMBOPLASTIN TIME PARTIAL: CPT

## 2023-07-17 PROCEDURE — 83605 ASSAY OF LACTIC ACID: CPT

## 2023-07-17 PROCEDURE — 36415 COLL VENOUS BLD VENIPUNCTURE: CPT

## 2023-07-17 PROCEDURE — 87086 URINE CULTURE/COLONY COUNT: CPT

## 2023-07-17 PROCEDURE — 99285 EMERGENCY DEPT VISIT HI MDM: CPT | Mod: 25

## 2023-07-17 PROCEDURE — 96374 THER/PROPH/DIAG INJ IV PUSH: CPT

## 2023-07-17 PROCEDURE — 85025 COMPLETE CBC W/AUTO DIFF WBC: CPT

## 2023-07-17 PROCEDURE — 93005 ELECTROCARDIOGRAM TRACING: CPT

## 2023-07-17 RX ORDER — PIOGLITAZONE HYDROCHLORIDE 15 MG/1
1 TABLET ORAL
Qty: 0 | Refills: 0 | DISCHARGE

## 2023-07-17 RX ORDER — CHOLECALCIFEROL (VITAMIN D3) 125 MCG
1 CAPSULE ORAL
Qty: 0 | Refills: 0 | DISCHARGE

## 2023-07-17 RX ORDER — EMPAGLIFLOZIN 10 MG/1
1 TABLET, FILM COATED ORAL
Qty: 0 | Refills: 0 | DISCHARGE

## 2023-07-17 RX ORDER — TICAGRELOR 90 MG/1
1 TABLET ORAL
Qty: 0 | Refills: 0 | DISCHARGE

## 2023-07-17 RX ORDER — ASPIRIN/CALCIUM CARB/MAGNESIUM 324 MG
1 TABLET ORAL
Qty: 0 | Refills: 0 | DISCHARGE

## 2023-07-17 RX ORDER — METFORMIN HYDROCHLORIDE 850 MG/1
1 TABLET ORAL
Qty: 0 | Refills: 0 | DISCHARGE

## 2023-07-17 RX ORDER — IRBESARTAN 75 MG/1
1 TABLET ORAL
Qty: 0 | Refills: 0 | DISCHARGE

## 2023-07-17 RX ORDER — NIACIN 50 MG
1 TABLET ORAL
Qty: 0 | Refills: 0 | DISCHARGE

## 2023-07-17 RX ORDER — ICOSAPENT ETHYL 500 MG/1
1 CAPSULE, LIQUID FILLED ORAL
Qty: 0 | Refills: 0 | DISCHARGE

## 2023-07-17 RX ORDER — ROSUVASTATIN CALCIUM 5 MG/1
1 TABLET ORAL
Qty: 0 | Refills: 0 | DISCHARGE

## 2023-07-17 RX ORDER — ONDANSETRON 8 MG/1
1 TABLET, FILM COATED ORAL
Qty: 12 | Refills: 0
Start: 2023-07-17

## 2023-07-17 RX ORDER — ALPRAZOLAM 0.25 MG
1 TABLET ORAL
Qty: 6 | Refills: 0
Start: 2023-07-17

## 2023-07-18 LAB
CULTURE RESULTS: SIGNIFICANT CHANGE UP
SPECIMEN SOURCE: SIGNIFICANT CHANGE UP

## 2023-08-03 ENCOUNTER — NON-APPOINTMENT (OUTPATIENT)
Age: 63
End: 2023-08-03

## 2023-08-03 ENCOUNTER — APPOINTMENT (OUTPATIENT)
Dept: CARDIOLOGY | Facility: CLINIC | Age: 63
End: 2023-08-03
Payer: COMMERCIAL

## 2023-08-03 VITALS
HEART RATE: 92 BPM | DIASTOLIC BLOOD PRESSURE: 81 MMHG | BODY MASS INDEX: 33.6 KG/M2 | SYSTOLIC BLOOD PRESSURE: 122 MMHG | HEIGHT: 71 IN | OXYGEN SATURATION: 97 % | WEIGHT: 240 LBS

## 2023-08-03 PROCEDURE — 99213 OFFICE O/P EST LOW 20 MIN: CPT | Mod: 25

## 2023-08-03 PROCEDURE — 93000 ELECTROCARDIOGRAM COMPLETE: CPT

## 2023-08-03 NOTE — CARDIOLOGY SUMMARY
[de-identified] : 08/03/23 - normal sinus rhythm, normal ECG  [de-identified] : \par  12/13/22 (exercise MIBI) - 6 METS, 106% MPHR, 06:01 min, Duke score 6, no evidence of infarction or inducible ischemia, LVEF 56%\par  08/28/19 (exercise ECG) - 6 METS, 93% MPHR, 05:41 min, no CP, no ECG abnormalities, Carballo score 5\par  01/12/17 (exercise myoview) - 6 METS, 96% MPHR, 06:01 min, Duke score 6, medium sized, mild to moderate defects in inferior and inferolateral walls that are reversible, suggestive of ischemia, LVEF 54% with normal wall motion\par   [de-identified] : \par  12/13/22 - normal LA, normal LV and RV size and function, LVEF 67%\par  08/28/18 - normal LA, normal LV and RV size and function, LVEF 67%\par   [de-identified] : \par  09/05/18 (PCI) - POBA pSVG-OM1 80% ISR ->30%\par  09/05/18 (CATH) - dLM 20%, mLAD 100%, pCx 60%, oOM1 100%, mRCA 30%, pRPLS 60%, patent LIMA-LAD, patent SVG-D1 y graft from LIMA, SVG-OM1 p80% ISR, p40%, m50%, d60%\par  01/19/17 (PCI) - PROMUS PREMIER stents to SVG-OM1 o90% and p80%\par  04/30/14 (PCI) - PROMUS PREMIER stent to pSVG-OM1 80%\par   [de-identified] : \par  3/2005 (CABG @[facility]) - triple bypass

## 2023-08-31 NOTE — BRIEF OPERATIVE NOTE - NSICDXBRIEFPROCEDURE_GEN_ALL_CORE_FT
"Subjective:   Ariana Ferris is a 54 y.o. female here today for     HPI:Patient is here to discuss: COVID-19    Patient is virtual visit for COVID-19 diagnosis.  Has cough congestion body aches.  No fevers.  No shortness of breath chest pain or chest tightness    ROS   No headaches, chest pain, no shortness of breath, abdominal pain, nausea, or vomiting.  All other systems were reviewed and are negative or noted as positive in the HPI.     Objective:     Ht 1.753 m (5' 9\")   Wt 99.3 kg (219 lb)  Body mass index is 32.34 kg/m².     Physical Exam:  General: Patient appears well-nourished, well-hydrated, nontoxic  HEENT, normocephalic atraumatic, PERRLA, extraocular movements intact, nares are patent and clear  Neck: No visible masses, thyromegaly or abnormalities noted  Cardiovascular.  Sitting comfortably without visible signs of edema  Lungs: No cyanosis noted, nondyspneic  Skin: Well perfused without evidence of rash or lesions  Neurological: Cranial nerves II through XII intact, normal gait  Musculoskeletal: Normal range of motion, normal strength and no deficit noted     Clinical Course/Lab Analysis:        Assessment and Plan:   The following treatment plan was discussed.  Signs and symptoms for which to return were discussed with patient at length.  Patient verbalized understanding.    1. COVID-19  Nirmatrelvir&Ritonavir 300/100 20 x 150 MG & 10 x 100MG Tablet Therapy Pack    promethazine-dextromethorphan (PROMETHAZINE-DM) 6.25-15 MG/5ML syrup      2. Class 2 obesity            1.  COVID-19 is new and unstable.  I discussed with her care measures we will send Paxlovid to the pharmacy per her request.  Please follow-up if symptoms worsen despite treatment and go to the ER if you develop shortness of breath, dizziness, chest pain or chest tightness      Followup: As needed    This evaluation was conducted via Zoom using secure and encrypted videoconferencing technology. The patient was in their home in the " [facility].    The patient's identity was confirmed and verbal consent was obtained for this virtual visit.     Please note that this dictation was created using voice recognition software. I have made every reasonable attempt to correct obvious errors, but I expect that there are errors of grammar and possibly content that I did not discover before finalizing the note.              PROCEDURES:  Arthrotomy of left shoulder 02-Aug-2019 09:50:10 1. Subacromial decompression, 2. Distal clavicle excision Anil Denise  Arthroscopy of left shoulder 02-Aug-2019 09:48:44 1. Debridement of glenoid labral cartilage, 2. Subacromial/subdeltoid bursectomy Anil Denise

## 2023-09-20 NOTE — ASU PREOP CHECKLIST - SURGICAL CONSENT
Patient has 2 insurance plans.    Per [employer] it is not covered under Part D law.    Routed to Medicaid Specialist who is working on PA through Medicaid. Please note weight loss medication PA's through Medicaid may take 4-6 weeks to process through Medicaid.  
Phentermine Approved    Prior Authorization Number: [id number]  Dates of approval: 09/20/2023-12/20/2023  Filling [facility]: [facility]  Additional Information:     1st trial initial - must lose 10 lbs to qualify for renewal    
done

## 2024-05-10 NOTE — ED ADULT NURSE NOTE - NS PRO PASSIVE SMOKE EXP
The patient's goals for the shift include jackie    The clinical goals for the shift include safety      Skin  Add All  Decreased wound size/increased tissue granulation at next dressing change  Add  Today at 0019 - Progressing by Dian Matthews, RN  Add  Participates in plan/prevention/treatment measures  Add  Today at 0019 - Progressing by Dian Matthews, RN  Add  Prevent/manage excess moisture  Add  Today at 0019 - Progressing by Dian Matthews, RN  Add  Prevent/minimize sheer/friction injuries  Add  Today at 0019 - Progressing by Dian Matthews, RN  Add  Promote/optimize nutrition  Add  Today at 0019 - Progressing by Dian Matthews, RN  Add  Promote skin healing  Add  Today at 0019 - Progressing by Dian Matthews, RN  Add  Pain  Add All  My pain/discomfort is manageable  Add  Today at 0019 - Progressing by Dian Matthews, RN  Add  Safety  Add All  Patient will be injury free during hospitalization  Add  Today at 0019 - Progressing by Dian Matthews, RN  Add  I will remain free of falls  Add  Today at 0019 - Progressing by Dian Matthews, RN  Add  Daily Care  Add All  Daily care needs are met  Add  Today at 0019 - Progressing by Dian Matthews RN  Add  Psychosocial Needs  Add All  Demonstrates ability to cope with hospitalization/illness  Add  Today at 0019 - Progressing by Dian Matthews RN  Add  Collaborate with me, my family, and caregiver to identify my specific goals  Add  Today at 0019 - Progressing by Dian Matthews, RN  Add  Discharge Barriers  Add All  My discharge needs are met  Add  Today at 0019 - Progressing by Dian Matthews, RN  Add  Pain - Adult  Add All  Verbalizes/displays adequate comfort level or baseline comfort level  Add  Today at 0019 - Progressing by Dian Matthews, RN  Add  Safety - Adult  Add All  Free from fall injury  Add  Today at 0019 - Progressing by Dian Matthews RN  Add  Discharge Planning  Add  All  Discharge to home or other facility with appropriate resources  Add  Today at 0019 - Progressing by Dian Matthews RN  Add  Chronic Conditions and Co-morbidities  Add All  Patient's chronic conditions and co-morbidity symptoms are monitored and maintained or improved  Add  Today at 0019 - Progressing by iDan Matthews, RN  Add   No

## 2024-05-15 ENCOUNTER — APPOINTMENT (OUTPATIENT)
Dept: CARDIOLOGY | Facility: CLINIC | Age: 64
End: 2024-05-15
Payer: COMMERCIAL

## 2024-05-15 ENCOUNTER — NON-APPOINTMENT (OUTPATIENT)
Age: 64
End: 2024-05-15

## 2024-05-15 VITALS
WEIGHT: 246 LBS | HEART RATE: 79 BPM | SYSTOLIC BLOOD PRESSURE: 113 MMHG | HEIGHT: 71 IN | BODY MASS INDEX: 34.44 KG/M2 | OXYGEN SATURATION: 96 % | DIASTOLIC BLOOD PRESSURE: 75 MMHG

## 2024-05-15 DIAGNOSIS — I25.10 ATHEROSCLEROTIC HEART DISEASE OF NATIVE CORONARY ARTERY W/OUT ANGINA PECTORIS: ICD-10-CM

## 2024-05-15 DIAGNOSIS — I10 ESSENTIAL (PRIMARY) HYPERTENSION: ICD-10-CM

## 2024-05-15 DIAGNOSIS — R07.89 OTHER CHEST PAIN: ICD-10-CM

## 2024-05-15 PROCEDURE — G2211 COMPLEX E/M VISIT ADD ON: CPT

## 2024-05-15 PROCEDURE — 99214 OFFICE O/P EST MOD 30 MIN: CPT

## 2024-05-15 PROCEDURE — 93000 ELECTROCARDIOGRAM COMPLETE: CPT

## 2024-05-15 NOTE — CARDIOLOGY SUMMARY
[de-identified] : 05/15/24 - normal sinus rhythm, poor R-wave progression [de-identified] : 12/13/22 - normal LA, normal LV and RV size and function, LVEF 67% 08/28/18 - normal LA, normal LV and RV size and function, LVEF 67% [de-identified] : 12/13/22 (exercise MIBI) - 6 METS, 106% MPHR, 06:01 min, Duke score 6, no evidence of infarction or inducible ischemia, LVEF 56% 08/28/19 (exercise ECG) - 6 METS, 93% MPHR, 05:41 min, no CP, no ECG abnormalities, Duke score 5 01/12/17 (exercise myoview) - 6 METS, 96% MPHR, 06:01 min, Duke score 6, medium sized, mild to moderate defects in inferior and inferolateral walls that are reversible, suggestive of ischemia, LVEF 54% with normal wall motion [de-identified] : 09/05/18 (PCI) - POBA pSVG-OM1 80% ISR ->30% 09/05/18 (CATH) - dLM 20%, mLAD 100%, pCx 60%, oOM1 100%, mRCA 30%, pRPLS 60%, patent LIMA-LAD, patent SVG-D1 y graft from LIMA, SVG-OM1 p80% ISR, p40%, m50%, d60% 01/19/17 (PCI) - PROMUS PREMIER stents to SVG-OM1 o90% and p80% 04/30/14 (PCI) - PROMUS PREMIER stent to pSVG-OM1 80% [de-identified] : \par  3/2005 (CABG @[facility]) - triple bypass

## 2024-05-15 NOTE — HISTORY OF PRESENT ILLNESS
[FreeTextEntry1] : Currently doing okay. Denies chest pain, shortness of breath or palpitations. Has gained weight.

## 2024-05-15 NOTE — DISCUSSION/SUMMARY
[Coronary Artery Disease] : coronary artery disease [Hypertension] : hypertension [Stable] : stable [FreeTextEntry1] : Currently stable from a cardiovascular standpoint. Normotensive. Euvolemic. Stable CAD (s/p CABG, s/p multiple stents) with preserved LV systolic function. No ischemic or CHF symptoms. Continue current medications including aspirin, Brilinta, and Crestor. ECG completed today and reviewed. Follow up in 6 months. [EKG obtained to assist in diagnosis and management of assessed problem(s)] : EKG obtained to assist in diagnosis and management of assessed problem(s)

## 2024-06-07 ENCOUNTER — NON-APPOINTMENT (OUTPATIENT)
Age: 64
End: 2024-06-07

## 2024-08-14 NOTE — ED ADULT NURSE NOTE - CAS DISCH BELONGINGS RETURNED
MEDICATIONS:  See Medication List (bring to your doctor appointments).    VACCINES:  Most Recent Immunizations   Administered Date(s) Administered    COVID Pfizer 12Y+ 05/12/2022    COVID Pfizer 12Y+ (Requires Dilution) 04/29/2021    Influenza, quadrivalent, MDCK, preserve-free 10/18/2021    Influenza, quadrivalent, multi-dose 12/17/2019    Influenza, seasonal, injectable, trivalent 01/09/2018    Pneumococcal conjugate PCV 13 09/29/2015    Tdap 12/31/2015       ACTIVITY:  Weigh yourself daily (first thing in the morning, with same amount of clothes on) unless told otherwise by your doctor.  Continue activity as you were in the hospital, slowly increase to what you were doing previously.  Up as desired / no restrictions.    SMOKING:  Avoid all tobacco products and secondhand smoke.  Smoking Cessation Counseling offered.  [place] Toll Free Quit Line: [phone]    DIET:  Limit salt and salty foods unless told otherwise by your doctor.  No Restrictions    Trouble breathing or chest pain - CALL 911    CONTACT YOUR DOCTOR IF:  You have symptoms that are not \"normal\" for you.  You have new or worse symptoms or pain, not relieved by medicine or rest.  Temperature greater than 101 degrees F, chills or flu like symptoms.  You gain more than 3 pounds in 2 days.  Increased swelling, redness or drainage.   Yes

## 2024-09-18 ENCOUNTER — NON-APPOINTMENT (OUTPATIENT)
Age: 64
End: 2024-09-18

## 2024-09-19 ENCOUNTER — NON-APPOINTMENT (OUTPATIENT)
Age: 64
End: 2024-09-19

## 2024-09-19 ENCOUNTER — APPOINTMENT (OUTPATIENT)
Dept: CARDIOLOGY | Facility: CLINIC | Age: 64
End: 2024-09-19
Payer: MEDICARE

## 2024-09-19 VITALS
WEIGHT: 239 LBS | HEART RATE: 85 BPM | BODY MASS INDEX: 33.46 KG/M2 | DIASTOLIC BLOOD PRESSURE: 76 MMHG | HEIGHT: 71 IN | SYSTOLIC BLOOD PRESSURE: 107 MMHG | OXYGEN SATURATION: 97 %

## 2024-09-19 DIAGNOSIS — I25.10 ATHEROSCLEROTIC HEART DISEASE OF NATIVE CORONARY ARTERY W/OUT ANGINA PECTORIS: ICD-10-CM

## 2024-09-19 DIAGNOSIS — I10 ESSENTIAL (PRIMARY) HYPERTENSION: ICD-10-CM

## 2024-09-19 PROCEDURE — 99214 OFFICE O/P EST MOD 30 MIN: CPT

## 2024-09-19 PROCEDURE — 93000 ELECTROCARDIOGRAM COMPLETE: CPT

## 2024-09-19 PROCEDURE — G2211 COMPLEX E/M VISIT ADD ON: CPT

## 2024-09-19 PROCEDURE — 99204 OFFICE O/P NEW MOD 45 MIN: CPT

## 2024-09-19 NOTE — CARDIOLOGY SUMMARY
[de-identified] : 09/19/24 - normal sinus rhythm, old inferior infarct, poor R-wave progression [de-identified] : 12/13/22 (exercise MIBI) - 6 METS, 106% MPHR, 06:01 min, Duke score 6, no evidence of infarction or inducible ischemia, LVEF 56% 08/28/19 (exercise ECG) - 6 METS, 93% MPHR, 05:41 min, no CP, no ECG abnormalities, Duke score 5 01/12/17 (exercise myoview) - 6 METS, 96% MPHR, 06:01 min, Duke score 6, medium sized, mild to moderate defects in inferior and inferolateral walls that are reversible, suggestive of ischemia, LVEF 54% with normal wall motion [de-identified] : 12/13/22 - normal LA, normal LV and RV size and function, LVEF 67% 08/28/18 - normal LA, normal LV and RV size and function, LVEF 67% [de-identified] : 09/05/18 (PCI) - POBA pSVG-OM1 80% ISR ->30% 09/05/18 (CATH) - dLM 20%, mLAD 100%, pCx 60%, oOM1 100%, mRCA 30%, pRPLS 60%, patent LIMA-LAD, patent SVG-D1 y graft from LIMA, SVG-OM1 p80% ISR, p40%, m50%, d60% 01/19/17 (PCI) - PROMUS PREMIER stents to SVG-OM1 o90% and p80% 04/30/14 (PCI) - PROMUS PREMIER stent to pSVG-OM1 80% [de-identified] : \par  3/2005 (CABG @[facility]) - triple bypass

## 2024-11-22 ENCOUNTER — EMERGENCY (EMERGENCY)
Facility: HOSPITAL | Age: 64
LOS: 1 days | Discharge: HOME | End: 2024-11-22
Attending: EMERGENCY MEDICINE | Admitting: EMERGENCY MEDICINE
Payer: MEDICARE

## 2024-11-22 VITALS
WEIGHT: 235.01 LBS | DIASTOLIC BLOOD PRESSURE: 108 MMHG | TEMPERATURE: 98 F | HEART RATE: 105 BPM | RESPIRATION RATE: 18 BRPM | HEIGHT: 70 IN | OXYGEN SATURATION: 96 % | SYSTOLIC BLOOD PRESSURE: 159 MMHG

## 2024-11-22 VITALS
SYSTOLIC BLOOD PRESSURE: 133 MMHG | HEART RATE: 82 BPM | TEMPERATURE: 98 F | RESPIRATION RATE: 18 BRPM | OXYGEN SATURATION: 98 % | DIASTOLIC BLOOD PRESSURE: 80 MMHG

## 2024-11-22 DIAGNOSIS — Z95.5 PRESENCE OF CORONARY ANGIOPLASTY IMPLANT AND GRAFT: Chronic | ICD-10-CM

## 2024-11-22 DIAGNOSIS — Q15.9 CONGENITAL MALFORMATION OF EYE, UNSPECIFIED: Chronic | ICD-10-CM

## 2024-11-22 DIAGNOSIS — I25.10 ATHEROSCLEROTIC HEART DISEASE OF NATIVE CORONARY ARTERY WITHOUT ANGINA PECTORIS: Chronic | ICD-10-CM

## 2024-11-22 DIAGNOSIS — K46.9 UNSPECIFIED ABDOMINAL HERNIA WITHOUT OBSTRUCTION OR GANGRENE: Chronic | ICD-10-CM

## 2024-11-22 DIAGNOSIS — Z95.1 PRESENCE OF AORTOCORONARY BYPASS GRAFT: Chronic | ICD-10-CM

## 2024-11-22 LAB
ALBUMIN SERPL ELPH-MCNC: 4.1 G/DL — SIGNIFICANT CHANGE UP (ref 3.3–5)
ALP SERPL-CCNC: 123 U/L — HIGH (ref 40–120)
ALT FLD-CCNC: 32 U/L — SIGNIFICANT CHANGE UP (ref 12–78)
ANION GAP SERPL CALC-SCNC: 6 MMOL/L — SIGNIFICANT CHANGE UP (ref 5–17)
AST SERPL-CCNC: 19 U/L — SIGNIFICANT CHANGE UP (ref 15–37)
BASOPHILS # BLD AUTO: 0.03 K/UL — SIGNIFICANT CHANGE UP (ref 0–0.2)
BASOPHILS NFR BLD AUTO: 0.2 % — SIGNIFICANT CHANGE UP (ref 0–2)
BILIRUB SERPL-MCNC: 1 MG/DL — SIGNIFICANT CHANGE UP (ref 0.2–1.2)
BUN SERPL-MCNC: 9 MG/DL — SIGNIFICANT CHANGE UP (ref 7–23)
CALCIUM SERPL-MCNC: 10.6 MG/DL — HIGH (ref 8.5–10.1)
CHLORIDE SERPL-SCNC: 110 MMOL/L — HIGH (ref 96–108)
CO2 SERPL-SCNC: 25 MMOL/L — SIGNIFICANT CHANGE UP (ref 22–31)
CREAT SERPL-MCNC: 1 MG/DL — SIGNIFICANT CHANGE UP (ref 0.5–1.3)
EGFR: 85 ML/MIN/1.73M2 — SIGNIFICANT CHANGE UP
EOSINOPHIL # BLD AUTO: 0.05 K/UL — SIGNIFICANT CHANGE UP (ref 0–0.5)
EOSINOPHIL NFR BLD AUTO: 0.3 % — SIGNIFICANT CHANGE UP (ref 0–6)
GLUCOSE SERPL-MCNC: 131 MG/DL — HIGH (ref 70–99)
HCT VFR BLD CALC: 42.4 % — SIGNIFICANT CHANGE UP (ref 39–50)
HGB BLD-MCNC: 14.9 G/DL — SIGNIFICANT CHANGE UP (ref 13–17)
IMM GRANULOCYTES NFR BLD AUTO: 0.5 % — SIGNIFICANT CHANGE UP (ref 0–0.9)
LIDOCAIN IGE QN: 51 U/L — SIGNIFICANT CHANGE UP (ref 13–75)
LYMPHOCYTES # BLD AUTO: 1.92 K/UL — SIGNIFICANT CHANGE UP (ref 1–3.3)
LYMPHOCYTES # BLD AUTO: 11.3 % — LOW (ref 13–44)
MCHC RBC-ENTMCNC: 30.8 PG — SIGNIFICANT CHANGE UP (ref 27–34)
MCHC RBC-ENTMCNC: 35.1 G/DL — SIGNIFICANT CHANGE UP (ref 32–36)
MCV RBC AUTO: 87.8 FL — SIGNIFICANT CHANGE UP (ref 80–100)
MONOCYTES # BLD AUTO: 0.51 K/UL — SIGNIFICANT CHANGE UP (ref 0–0.9)
MONOCYTES NFR BLD AUTO: 3 % — SIGNIFICANT CHANGE UP (ref 2–14)
NEUTROPHILS # BLD AUTO: 14.45 K/UL — HIGH (ref 1.8–7.4)
NEUTROPHILS NFR BLD AUTO: 84.7 % — HIGH (ref 43–77)
NRBC # BLD: 0 /100 WBCS — SIGNIFICANT CHANGE UP (ref 0–0)
PLATELET # BLD AUTO: 203 K/UL — SIGNIFICANT CHANGE UP (ref 150–400)
POTASSIUM SERPL-MCNC: 4.2 MMOL/L — SIGNIFICANT CHANGE UP (ref 3.5–5.3)
POTASSIUM SERPL-SCNC: 4.2 MMOL/L — SIGNIFICANT CHANGE UP (ref 3.5–5.3)
PROT SERPL-MCNC: 7.7 G/DL — SIGNIFICANT CHANGE UP (ref 6–8.3)
RBC # BLD: 4.83 M/UL — SIGNIFICANT CHANGE UP (ref 4.2–5.8)
RBC # FLD: 13.2 % — SIGNIFICANT CHANGE UP (ref 10.3–14.5)
SODIUM SERPL-SCNC: 141 MMOL/L — SIGNIFICANT CHANGE UP (ref 135–145)
WBC # BLD: 17.05 K/UL — HIGH (ref 3.8–10.5)
WBC # FLD AUTO: 17.05 K/UL — HIGH (ref 3.8–10.5)

## 2024-11-22 PROCEDURE — 85025 COMPLETE CBC W/AUTO DIFF WBC: CPT

## 2024-11-22 PROCEDURE — 36415 COLL VENOUS BLD VENIPUNCTURE: CPT

## 2024-11-22 PROCEDURE — 96375 TX/PRO/DX INJ NEW DRUG ADDON: CPT

## 2024-11-22 PROCEDURE — 74177 CT ABD & PELVIS W/CONTRAST: CPT | Mod: MC

## 2024-11-22 PROCEDURE — 80053 COMPREHEN METABOLIC PANEL: CPT

## 2024-11-22 PROCEDURE — 99284 EMERGENCY DEPT VISIT MOD MDM: CPT

## 2024-11-22 PROCEDURE — 99284 EMERGENCY DEPT VISIT MOD MDM: CPT | Mod: 25

## 2024-11-22 PROCEDURE — 74177 CT ABD & PELVIS W/CONTRAST: CPT | Mod: 26,MC

## 2024-11-22 PROCEDURE — 83690 ASSAY OF LIPASE: CPT

## 2024-11-22 PROCEDURE — 96374 THER/PROPH/DIAG INJ IV PUSH: CPT | Mod: XU

## 2024-11-22 RX ORDER — METRONIDAZOLE 250 MG/1
500 TABLET ORAL ONCE
Refills: 0 | Status: COMPLETED | OUTPATIENT
Start: 2024-11-22 | End: 2024-11-22

## 2024-11-22 RX ORDER — CIPROFLOXACIN LACTATE 200MG/20ML
1 VIAL (ML) INTRAVENOUS
Qty: 20 | Refills: 0
Start: 2024-11-22 | End: 2024-12-01

## 2024-11-22 RX ORDER — ONDANSETRON HYDROCHLORIDE 2 MG/ML
1 INJECTION, SOLUTION INTRAMUSCULAR; INTRAVENOUS
Qty: 20 | Refills: 0
Start: 2024-11-22 | End: 2024-11-26

## 2024-11-22 RX ORDER — CIPROFLOXACIN LACTATE 200MG/20ML
400 VIAL (ML) INTRAVENOUS ONCE
Refills: 0 | Status: COMPLETED | OUTPATIENT
Start: 2024-11-22 | End: 2024-11-22

## 2024-11-22 RX ORDER — METRONIDAZOLE 250 MG/1
1 TABLET ORAL
Qty: 30 | Refills: 0
Start: 2024-11-22 | End: 2024-12-01

## 2024-11-22 RX ORDER — ONDANSETRON HYDROCHLORIDE 2 MG/ML
4 INJECTION, SOLUTION INTRAMUSCULAR; INTRAVENOUS ONCE
Refills: 0 | Status: COMPLETED | OUTPATIENT
Start: 2024-11-22 | End: 2024-11-22

## 2024-11-22 RX ORDER — SODIUM CHLORIDE 9 MG/ML
1000 INJECTION, SOLUTION INTRAMUSCULAR; INTRAVENOUS; SUBCUTANEOUS ONCE
Refills: 0 | Status: COMPLETED | OUTPATIENT
Start: 2024-11-22 | End: 2024-11-22

## 2024-11-22 RX ORDER — KETOROLAC TROMETHAMINE 30 MG/ML
15 INJECTION INTRAMUSCULAR; INTRAVENOUS ONCE
Refills: 0 | Status: DISCONTINUED | OUTPATIENT
Start: 2024-11-22 | End: 2024-11-22

## 2024-11-22 RX ADMIN — SODIUM CHLORIDE 1000 MILLILITER(S): 9 INJECTION, SOLUTION INTRAMUSCULAR; INTRAVENOUS; SUBCUTANEOUS at 09:26

## 2024-11-22 RX ADMIN — Medication 200 MILLIGRAM(S): at 13:55

## 2024-11-22 RX ADMIN — KETOROLAC TROMETHAMINE 15 MILLIGRAM(S): 30 INJECTION INTRAMUSCULAR; INTRAVENOUS at 09:26

## 2024-11-22 RX ADMIN — ONDANSETRON HYDROCHLORIDE 4 MILLIGRAM(S): 2 INJECTION, SOLUTION INTRAMUSCULAR; INTRAVENOUS at 09:26

## 2024-11-22 RX ADMIN — METRONIDAZOLE 100 MILLIGRAM(S): 250 TABLET ORAL at 13:55

## 2024-11-22 NOTE — SBIRT NOTE ADULT - NSSBIRTDRGBRIEFINTDET_GEN_A_CORE
Pt states that he has 1/2 to 1 joint of pot a day to help with his anxiety and that his medical provider is well aware. The patient has no concerns at this time and does not want any resources.

## 2024-11-22 NOTE — ED ADULT TRIAGE NOTE - CHIEF COMPLAINT QUOTE
pt A&Ox4 ambulatory to triage with c/o LLQ pain. hx diverticulitis. went to GI [facility], put on Augmentin and has been taking since Monday night

## 2024-11-22 NOTE — ED PROVIDER NOTE - PHYSICAL EXAMINATION
Examination reveals a well-developed well-nourished white male in no acute distress clear lungs normal heart sounds no murmur abdomen that soft with slight tenderness to deep palpation left lower quadrant skin is warm and dry neurologically alert orient x 4 without any focal neurologic deficits.

## 2024-11-22 NOTE — ED ADULT NURSE NOTE - NS ED NURSE LEVEL OF CONSCIOUSNESS ORIENTATION
Pt obtunded. Minimally responsive, but able to say name.  b/l nephrostomy tubes
Oriented - self; Oriented - place; Oriented - time

## 2024-11-22 NOTE — ED PROVIDER NOTE - CARE PROVIDER_API CALL
Bryce Pride  Gastroenterology  [address] [place] [id number]  Phone: [phone]  Fax: [phone]  Follow Up Time:

## 2024-11-22 NOTE — ED ADULT NURSE NOTE - OBJECTIVE STATEMENT
Pt states that he has been having left lower abd pain since 11/5/. pt states that he saw his GI Dr and was placed on Augmentin and Linzess. pt states that he has  a history of diverticulitis. pt denies fever, bloody stools, vomiting, and diarrhea. pt in no acute distress. pt updated on plan of care.

## 2024-11-22 NOTE — ED PROVIDER NOTE - NSICDXPASTSURGICALHX_GEN_ALL_CORE_FT
PAST SURGICAL HISTORY:  CAD (Coronary Artery Disease) of Artery Bypass Graft 2005 - March 17th    CAD (Coronary Artery Disease), Nonautologous Biological Bypass Graft     CAD S/P percutaneous coronary angioplasty baloon angioplasty Sep 2018    Eye abnormality left eye amblyopia repaired in 1994    Hernia repaired    Hernia, Inguinal     Hypercholesterolemia     Pneumonia due to Organism     S/P CABG x 3 2005  3vCABG 2005 (LIMA to LAD, SVG to OM, left radial to Diag)   NYQ    Status post coronary artery stent placement Cardiac Stent Placed April 30th 2014 @ [facility]  Cardiac Stent X 2 placed January 15th 2017 (piggybacked) @ [facility]

## 2024-11-22 NOTE — ED ADULT NURSE NOTE - NS ED PATIENT SAFETY CONCERN
Lab of November 11, 2024 discussed  EKG discussed with no change  Continue current therapy   suggest increase lisinopril to 5 mg from 2.5  Encouraged Tylenol PM and melatonin 5 for at bedtime  Encouraged to resume exercise routine  Encouraged healthy cardiac diabetic diet with reduced carbs  Continue follow-up with cardiology  Return visit in 6 months or as needed  
No

## 2024-11-22 NOTE — ED PROVIDER NOTE - OBJECTIVE STATEMENT
Patient is a 64-year-old white male with history of hypertension hyperlipidemia coronary artery disease status post CABG years ago with additional history of diverticulosis diverticulitis here for evaluation of left lower quadrant abdominal pain.  Patient states that beginning November 5 of this year he developed a mild left lower quadrant abdominal discomfort.  Patient went to his gastroenterologist who felt that the pain was due to constipation and was placed on Linzess.  As result of continued low-level discomfort without associated symptoms patient was started on Augmentin on the 20th of this month with no improvement of symptomatology and thus was told to present himself here for further evaluation care treatment and management.  No vomiting no diarrhea slight nausea no dysuria no hematuria no cough no shortness of breath.

## 2024-11-22 NOTE — ED PROVIDER NOTE - DIFFERENTIAL DIAGNOSIS
Differential Diagnosis Left lower quadrant abdominal pain rule out diverticulitis rule out ureteral colic rule out musculoskeletal causes rule out hernia

## 2024-11-22 NOTE — ED PROVIDER NOTE - NSFOLLOWUPINSTRUCTIONS_ED_ALL_ED_FT
Rest.  Increase fluid intake.  Take Cipro 1 tablet twice a day for the next 10 days.  Take Flagyl 1 tablet 3 times a day for the next 10 days.  Follow-up with your primary care physician or gastroenterologist this week for reevaluation.  Return here if you feel worse in any way.

## 2024-11-22 NOTE — SBIRT NOTE ADULT - NSSBIRTALCNOACTINTDET_GEN_A_CORE
Pt stated he use to have more drinks in the past but has lost the taste for it and maybe drinks 1 x a week. Pt has no concerns

## 2024-11-22 NOTE — ED PROVIDER NOTE - CLINICAL SUMMARY MEDICAL DECISION MAKING FREE TEXT BOX
Left lower quadrant abdominal pain for the past 2-1/2 weeks requiring thorough independently performed history and physical as well as labs CBC chemistries urinalysis lipase as well as CAT scan of the abdomen and pelvis with IV contrast IV fluids and medication.

## 2024-11-22 NOTE — ED ADULT NURSE NOTE - NSICDXPASTSURGICALHX_GEN_ALL_CORE_FT
PAST SURGICAL HISTORY:  CAD (Coronary Artery Disease) of Artery Bypass Graft 2005 - March 17th    CAD (Coronary Artery Disease), Nonautologous Biological Bypass Graft     CAD S/P percutaneous coronary angioplasty baloon angioplasty Sep 2018    Eye abnormality left eye amblyopia repaired in 1994    Hernia repaired    Hernia, Inguinal     Hypercholesterolemia     Pneumonia due to Organism     S/P CABG x 3 2005  3vCABG 2005 (LIMA to LAD, SVG to OM, left radial to Diag)   NYQ    Status post coronary artery stent placement Cardiac Stent Placed April 30th 2014 @ [facility]  Cardiac Stent X 2 placed January 15th 2017 (piggybacked) @ [facility]

## 2024-11-22 NOTE — ED PROVIDER NOTE - PATIENT PORTAL LINK FT
You can access the FollowMyHealth Patient Portal offered by [facility] by registering at the following website: [email or web address]. By joining [facility]’s FollowMyHealth portal, you will also be able to view your health information using other applications (apps) compatible with our system.

## 2024-12-12 ENCOUNTER — INPATIENT (INPATIENT)
Facility: HOSPITAL | Age: 64
LOS: 3 days | Discharge: HOME | DRG: 392 | End: 2024-12-16
Attending: GENERAL PRACTICE | Admitting: STUDENT IN AN ORGANIZED HEALTH CARE EDUCATION/TRAINING PROGRAM
Payer: MEDICARE

## 2024-12-12 VITALS
HEART RATE: 98 BPM | RESPIRATION RATE: 16 BRPM | SYSTOLIC BLOOD PRESSURE: 147 MMHG | WEIGHT: 229.94 LBS | OXYGEN SATURATION: 98 % | DIASTOLIC BLOOD PRESSURE: 96 MMHG | TEMPERATURE: 99 F | HEIGHT: 70 IN

## 2024-12-12 DIAGNOSIS — K57.92 DIVERTICULITIS OF INTESTINE, PART UNSPECIFIED, WITHOUT PERFORATION OR ABSCESS WITHOUT BLEEDING: ICD-10-CM

## 2024-12-12 DIAGNOSIS — I25.10 ATHEROSCLEROTIC HEART DISEASE OF NATIVE CORONARY ARTERY WITHOUT ANGINA PECTORIS: Chronic | ICD-10-CM

## 2024-12-12 DIAGNOSIS — I10 ESSENTIAL (PRIMARY) HYPERTENSION: ICD-10-CM

## 2024-12-12 DIAGNOSIS — K46.9 UNSPECIFIED ABDOMINAL HERNIA WITHOUT OBSTRUCTION OR GANGRENE: Chronic | ICD-10-CM

## 2024-12-12 DIAGNOSIS — E78.5 HYPERLIPIDEMIA, UNSPECIFIED: ICD-10-CM

## 2024-12-12 DIAGNOSIS — Z95.5 PRESENCE OF CORONARY ANGIOPLASTY IMPLANT AND GRAFT: Chronic | ICD-10-CM

## 2024-12-12 DIAGNOSIS — Z95.1 PRESENCE OF AORTOCORONARY BYPASS GRAFT: Chronic | ICD-10-CM

## 2024-12-12 DIAGNOSIS — Q15.9 CONGENITAL MALFORMATION OF EYE, UNSPECIFIED: Chronic | ICD-10-CM

## 2024-12-12 DIAGNOSIS — E11.9 TYPE 2 DIABETES MELLITUS WITHOUT COMPLICATIONS: ICD-10-CM

## 2024-12-12 DIAGNOSIS — Z29.9 ENCOUNTER FOR PROPHYLACTIC MEASURES, UNSPECIFIED: ICD-10-CM

## 2024-12-12 DIAGNOSIS — I25.10 ATHEROSCLEROTIC HEART DISEASE OF NATIVE CORONARY ARTERY WITHOUT ANGINA PECTORIS: ICD-10-CM

## 2024-12-12 LAB
ALBUMIN SERPL ELPH-MCNC: 3.6 G/DL — SIGNIFICANT CHANGE UP (ref 3.3–5)
ALP SERPL-CCNC: 105 U/L — SIGNIFICANT CHANGE UP (ref 40–120)
ALT FLD-CCNC: 58 U/L — SIGNIFICANT CHANGE UP (ref 12–78)
ANION GAP SERPL CALC-SCNC: 9 MMOL/L — SIGNIFICANT CHANGE UP (ref 5–17)
APTT BLD: 29.5 SEC — SIGNIFICANT CHANGE UP (ref 24.5–35.6)
AST SERPL-CCNC: 42 U/L — HIGH (ref 15–37)
BASOPHILS # BLD AUTO: 0.01 K/UL — SIGNIFICANT CHANGE UP (ref 0–0.2)
BASOPHILS NFR BLD AUTO: 0.1 % — SIGNIFICANT CHANGE UP (ref 0–2)
BILIRUB SERPL-MCNC: 0.5 MG/DL — SIGNIFICANT CHANGE UP (ref 0.2–1.2)
BUN SERPL-MCNC: 16 MG/DL — SIGNIFICANT CHANGE UP (ref 7–23)
CALCIUM SERPL-MCNC: 9.7 MG/DL — SIGNIFICANT CHANGE UP (ref 8.5–10.1)
CHLORIDE SERPL-SCNC: 107 MMOL/L — SIGNIFICANT CHANGE UP (ref 96–108)
CO2 SERPL-SCNC: 23 MMOL/L — SIGNIFICANT CHANGE UP (ref 22–31)
CREAT SERPL-MCNC: 1.1 MG/DL — SIGNIFICANT CHANGE UP (ref 0.5–1.3)
EGFR: 76 ML/MIN/1.73M2 — SIGNIFICANT CHANGE UP
EOSINOPHIL # BLD AUTO: 0.04 K/UL — SIGNIFICANT CHANGE UP (ref 0–0.5)
EOSINOPHIL NFR BLD AUTO: 0.5 % — SIGNIFICANT CHANGE UP (ref 0–6)
GLUCOSE SERPL-MCNC: 98 MG/DL — SIGNIFICANT CHANGE UP (ref 70–99)
HCT VFR BLD CALC: 41 % — SIGNIFICANT CHANGE UP (ref 39–50)
HGB BLD-MCNC: 14.4 G/DL — SIGNIFICANT CHANGE UP (ref 13–17)
IMM GRANULOCYTES NFR BLD AUTO: 0.4 % — SIGNIFICANT CHANGE UP (ref 0–0.9)
INR BLD: 1.08 RATIO — SIGNIFICANT CHANGE UP (ref 0.85–1.16)
LACTATE SERPL-SCNC: 1.3 MMOL/L — SIGNIFICANT CHANGE UP (ref 0.7–2)
LIDOCAIN IGE QN: 54 U/L — SIGNIFICANT CHANGE UP (ref 13–75)
LYMPHOCYTES # BLD AUTO: 1.8 K/UL — SIGNIFICANT CHANGE UP (ref 1–3.3)
LYMPHOCYTES # BLD AUTO: 23.9 % — SIGNIFICANT CHANGE UP (ref 13–44)
MCHC RBC-ENTMCNC: 30.8 PG — SIGNIFICANT CHANGE UP (ref 27–34)
MCHC RBC-ENTMCNC: 35.1 G/DL — SIGNIFICANT CHANGE UP (ref 32–36)
MCV RBC AUTO: 87.8 FL — SIGNIFICANT CHANGE UP (ref 80–100)
MONOCYTES # BLD AUTO: 0.57 K/UL — SIGNIFICANT CHANGE UP (ref 0–0.9)
MONOCYTES NFR BLD AUTO: 7.6 % — SIGNIFICANT CHANGE UP (ref 2–14)
NEUTROPHILS # BLD AUTO: 5.08 K/UL — SIGNIFICANT CHANGE UP (ref 1.8–7.4)
NEUTROPHILS NFR BLD AUTO: 67.5 % — SIGNIFICANT CHANGE UP (ref 43–77)
NRBC # BLD: 0 /100 WBCS — SIGNIFICANT CHANGE UP (ref 0–0)
PLATELET # BLD AUTO: 197 K/UL — SIGNIFICANT CHANGE UP (ref 150–400)
POTASSIUM SERPL-MCNC: 3.9 MMOL/L — SIGNIFICANT CHANGE UP (ref 3.5–5.3)
POTASSIUM SERPL-SCNC: 3.9 MMOL/L — SIGNIFICANT CHANGE UP (ref 3.5–5.3)
PROT SERPL-MCNC: 7.7 G/DL — SIGNIFICANT CHANGE UP (ref 6–8.3)
PROTHROM AB SERPL-ACNC: 12.7 SEC — SIGNIFICANT CHANGE UP (ref 9.9–13.4)
RBC # BLD: 4.67 M/UL — SIGNIFICANT CHANGE UP (ref 4.2–5.8)
RBC # FLD: 14.1 % — SIGNIFICANT CHANGE UP (ref 10.3–14.5)
SODIUM SERPL-SCNC: 139 MMOL/L — SIGNIFICANT CHANGE UP (ref 135–145)
WBC # BLD: 7.53 K/UL — SIGNIFICANT CHANGE UP (ref 3.8–10.5)
WBC # FLD AUTO: 7.53 K/UL — SIGNIFICANT CHANGE UP (ref 3.8–10.5)

## 2024-12-12 PROCEDURE — 93010 ELECTROCARDIOGRAM REPORT: CPT

## 2024-12-12 PROCEDURE — 74177 CT ABD & PELVIS W/CONTRAST: CPT | Mod: 26,MC

## 2024-12-12 PROCEDURE — 99285 EMERGENCY DEPT VISIT HI MDM: CPT

## 2024-12-12 PROCEDURE — 99222 1ST HOSP IP/OBS MODERATE 55: CPT | Mod: GC

## 2024-12-12 RX ORDER — SERTRALINE HYDROCHLORIDE 100 MG/1
1 TABLET, FILM COATED ORAL
Refills: 0 | DISCHARGE

## 2024-12-12 RX ORDER — TICAGRELOR 90 MG/1
90 TABLET ORAL EVERY 12 HOURS
Refills: 0 | Status: DISCONTINUED | OUTPATIENT
Start: 2024-12-12 | End: 2024-12-16

## 2024-12-12 RX ORDER — PIPERACILLIN SODIUM AND TAZOBACTAM SODIUM 4; .5 G/20ML; G/20ML
3.38 INJECTION, POWDER, LYOPHILIZED, FOR SOLUTION INTRAVENOUS EVERY 8 HOURS
Refills: 0 | Status: DISCONTINUED | OUTPATIENT
Start: 2024-12-12 | End: 2024-12-16

## 2024-12-12 RX ORDER — SACCHAROMYCES BOULARDII 250 MG
250 CAPSULE ORAL
Refills: 0 | Status: DISCONTINUED | OUTPATIENT
Start: 2024-12-12 | End: 2024-12-16

## 2024-12-12 RX ORDER — PANTOPRAZOLE SODIUM 40 MG/1
40 TABLET, DELAYED RELEASE ORAL
Refills: 0 | Status: DISCONTINUED | OUTPATIENT
Start: 2024-12-12 | End: 2024-12-16

## 2024-12-12 RX ORDER — OLANZAPINE 20 MG/1
2.5 TABLET ORAL AT BEDTIME
Refills: 0 | Status: DISCONTINUED | OUTPATIENT
Start: 2024-12-12 | End: 2024-12-16

## 2024-12-12 RX ORDER — ONDANSETRON HYDROCHLORIDE 4 MG/1
4 TABLET, FILM COATED ORAL ONCE
Refills: 0 | Status: COMPLETED | OUTPATIENT
Start: 2024-12-12 | End: 2024-12-12

## 2024-12-12 RX ORDER — PIPERACILLIN SODIUM AND TAZOBACTAM SODIUM 4; .5 G/20ML; G/20ML
3.38 INJECTION, POWDER, LYOPHILIZED, FOR SOLUTION INTRAVENOUS ONCE
Refills: 0 | Status: COMPLETED | OUTPATIENT
Start: 2024-12-12 | End: 2024-12-12

## 2024-12-12 RX ORDER — SODIUM CHLORIDE 9 MG/ML
2300 INJECTION, SOLUTION INTRAMUSCULAR; INTRAVENOUS; SUBCUTANEOUS ONCE
Refills: 0 | Status: COMPLETED | OUTPATIENT
Start: 2024-12-12 | End: 2024-12-12

## 2024-12-12 RX ORDER — ACETAMINOPHEN, DIPHENHYDRAMINE HCL, PHENYLEPHRINE HCL 325; 25; 5 MG/1; MG/1; MG/1
3 TABLET ORAL AT BEDTIME
Refills: 0 | Status: DISCONTINUED | OUTPATIENT
Start: 2024-12-12 | End: 2024-12-16

## 2024-12-12 RX ORDER — METOPROLOL TARTRATE 100 MG/1
25 TABLET, FILM COATED ORAL DAILY
Refills: 0 | Status: DISCONTINUED | OUTPATIENT
Start: 2024-12-12 | End: 2024-12-16

## 2024-12-12 RX ORDER — TAMSULOSIN HYDROCHLORIDE 0.4 MG/1
0.4 CAPSULE ORAL AT BEDTIME
Refills: 0 | Status: DISCONTINUED | OUTPATIENT
Start: 2024-12-12 | End: 2024-12-16

## 2024-12-12 RX ORDER — ACETAMINOPHEN 500MG 500 MG/1
650 TABLET, COATED ORAL EVERY 6 HOURS
Refills: 0 | Status: DISCONTINUED | OUTPATIENT
Start: 2024-12-12 | End: 2024-12-16

## 2024-12-12 RX ORDER — TAMSULOSIN HYDROCHLORIDE 0.4 MG/1
1 CAPSULE ORAL
Refills: 0 | DISCHARGE

## 2024-12-12 RX ORDER — 0.9 % SODIUM CHLORIDE 0.9 %
1000 INTRAVENOUS SOLUTION INTRAVENOUS
Refills: 0 | Status: DISCONTINUED | OUTPATIENT
Start: 2024-12-12 | End: 2024-12-13

## 2024-12-12 RX ORDER — LOSARTAN POTASSIUM 100 MG/1
25 TABLET, FILM COATED ORAL DAILY
Refills: 0 | Status: DISCONTINUED | OUTPATIENT
Start: 2024-12-12 | End: 2024-12-16

## 2024-12-12 RX ORDER — 0.9 % SODIUM CHLORIDE 0.9 %
1000 INTRAVENOUS SOLUTION INTRAVENOUS
Refills: 0 | Status: DISCONTINUED | OUTPATIENT
Start: 2024-12-12 | End: 2024-12-16

## 2024-12-12 RX ORDER — OLANZAPINE 20 MG/1
1 TABLET ORAL
Refills: 0 | DISCHARGE

## 2024-12-12 RX ORDER — GLUCAGON INJECTION, SOLUTION 0.5 MG/.1ML
1 INJECTION, SOLUTION SUBCUTANEOUS ONCE
Refills: 0 | Status: DISCONTINUED | OUTPATIENT
Start: 2024-12-12 | End: 2024-12-16

## 2024-12-12 RX ORDER — SERTRALINE HYDROCHLORIDE 100 MG/1
50 TABLET, FILM COATED ORAL DAILY
Refills: 0 | Status: DISCONTINUED | OUTPATIENT
Start: 2024-12-12 | End: 2024-12-16

## 2024-12-12 RX ORDER — ENOXAPARIN SODIUM 30 MG/.3ML
40 INJECTION SUBCUTANEOUS EVERY 24 HOURS
Refills: 0 | Status: DISCONTINUED | OUTPATIENT
Start: 2024-12-12 | End: 2024-12-16

## 2024-12-12 RX ORDER — ROSUVASTATIN CALCIUM 5 MG/1
40 TABLET, FILM COATED ORAL AT BEDTIME
Refills: 0 | Status: DISCONTINUED | OUTPATIENT
Start: 2024-12-12 | End: 2024-12-16

## 2024-12-12 RX ADMIN — TAMSULOSIN HYDROCHLORIDE 0.4 MILLIGRAM(S): 0.4 CAPSULE ORAL at 22:17

## 2024-12-12 RX ADMIN — TICAGRELOR 90 MILLIGRAM(S): 90 TABLET ORAL at 22:19

## 2024-12-12 RX ADMIN — PIPERACILLIN SODIUM AND TAZOBACTAM SODIUM 25 GRAM(S): 4; .5 INJECTION, POWDER, LYOPHILIZED, FOR SOLUTION INTRAVENOUS at 22:17

## 2024-12-12 RX ADMIN — SODIUM CHLORIDE 2300 MILLILITER(S): 9 INJECTION, SOLUTION INTRAMUSCULAR; INTRAVENOUS; SUBCUTANEOUS at 21:50

## 2024-12-12 RX ADMIN — PIPERACILLIN SODIUM AND TAZOBACTAM SODIUM 3.38 GRAM(S): 4; .5 INJECTION, POWDER, LYOPHILIZED, FOR SOLUTION INTRAVENOUS at 19:32

## 2024-12-12 RX ADMIN — ROSUVASTATIN CALCIUM 40 MILLIGRAM(S): 5 TABLET, FILM COATED ORAL at 22:16

## 2024-12-12 RX ADMIN — OLANZAPINE 2.5 MILLIGRAM(S): 20 TABLET ORAL at 22:16

## 2024-12-12 RX ADMIN — Medication 1000 MILLIGRAM(S): at 23:52

## 2024-12-12 RX ADMIN — PIPERACILLIN SODIUM AND TAZOBACTAM SODIUM 200 GRAM(S): 4; .5 INJECTION, POWDER, LYOPHILIZED, FOR SOLUTION INTRAVENOUS at 17:57

## 2024-12-12 RX ADMIN — ENOXAPARIN SODIUM 40 MILLIGRAM(S): 30 INJECTION SUBCUTANEOUS at 22:17

## 2024-12-12 RX ADMIN — ONDANSETRON HYDROCHLORIDE 4 MILLIGRAM(S): 4 TABLET, FILM COATED ORAL at 19:18

## 2024-12-12 RX ADMIN — SODIUM CHLORIDE 2300 MILLILITER(S): 9 INJECTION, SOLUTION INTRAMUSCULAR; INTRAVENOUS; SUBCUTANEOUS at 19:19

## 2024-12-12 NOTE — CONSULT NOTE ADULT - SUBJECTIVE AND OBJECTIVE BOX
SURGERY PA CONSULT NOTE:    CHIEF COMPLAINT:  Patient is a 64y old  Male who presents with a chief complaint of Diverticulitis (12 Dec 2024 20:08)      HPI:  63 yo male with PMHx of CAD s/p CABG s/p stent, HTN, HLD who presents to the ED with a cc of abdominal pain. Patient states that beginning  of  he developed a mild left lower quadrant abdominal discomfort.  Patient went to his gastroenterologist who felt that the pain was due to constipation and was placed on Linzess.  As result of continued low-level discomfort without associated symptoms patient was started on Augmentin on the  of this month with no improvement of symptomatology. Patient was then seen in the ED on   and pt was diagnosed with diverticulitis at that time. He was treated with a course of Cipro/Flagyl. 4-5 days later the pain returned. He spoke with his GI and he was started back on Flagyl and Cefpodoxime he began this on Tuesday. He followed up with gastroenterology today for continued pain and pt was sent to the ED for further work up. Denies documented fevers but has had cold chills and sweats. Reports nausea with vomiting with loose stool. Weight loss of 8lbs since . Denies CP, SOB. Pt also notes congestion and cough but his daughters work at a  with similar symptoms.      INTERVAL HPI  HPI as above. Patient reports being started on Augmentin last month by GI due to abdominal pain. Reports no improvement of symptoms and worsening abd pain with Augmentin leading to ED visit on , at which point patient was found to have diverticulitis on CT. Reports being sent home on cipro/flagyl with improvement on pain however reports pain returned after finishing his course. States calling his GI last Tuesday who re-prescribed abx with follow up today. Due to prolonged abdominal pain, patient was advised to return to ED for further evaluation. States having a long hx of diverticulitis for "maybe 10-12 years," and "about 8-9 episodes." Denies ever seeing a surgeon outpatient, but states having an upcoming appt in January. Reports last colonoscopy about 3 years ago which was normal.     PAST MEDICAL & SURGICAL HISTORY:  S/P CABG      CAD (coronary artery disease)  CABG x3      HTN (hypertension)      HLD (hyperlipidemia)      Complex tear of medial meniscus, current injury, left knee, initial encounter      Prediabetes      Arthritis      Back pain      Stented coronary artery  promus premier stent pSVG to OM  and promus premier stent x2 SVG to OM       CAD (Coronary Artery Disease), Nonautologous Biological Bypass Graft      CAD (Coronary Artery Disease) of Artery Bypass Graft   -       Pneumonia due to Organism      Hernia, Inguinal      Hypercholesterolemia      S/P CABG x 3    3vCABG  (LIMA to LAD, SVG to OM, left radial to Diag)   NYHQ      Hernia  repaired      Eye abnormality  left eye amblyopia repaired in       Status post coronary artery stent placement  Cardiac Stent Placed 2014 @ [facility]  Cardiac Stent X 2 placed 2017 (piggybacked) @ [facility]      CAD S/P percutaneous coronary angioplasty  baloon angioplasty Sep 2018      REVIEW OF SYSTEMS:  General/Constitutional: No acute distress, no headache, weakness, fevers   HEENT: Denies auditory or visual changes/disturbances, no vertigo  Respiratory: Admits cough, denies wheezing/SOB/dyspnea  Cardiac: Denies chest pain, palpitations  Abdomen: See HPI  Genitourinary: Denies urinary issues or complaints, denies dysuria/hematuria    MEDICATIONS:  Home Medications:  Aspirin Low Strength 81 mg oral tablet: 1 tab(s) orally once a day (at bedtime) (12 Dec 2024 21:37)  Brilinta (ticagrelor) 90 mg oral tablet: 1 tab(s) orally 2 times a day (12 Dec 2024 21:37)  irbesartan 75 mg oral tablet: 1 tab(s) orally once a day - IN AM (12 Dec 2024 21:37)  Jardiance 10 mg oral tablet: 1 tab(s) orally once a day (in the morning) (12 Dec 2024 21:37)  Metoprolol Succinate ER 25 mg oral tablet, extended release: 1 tab(s) orally once a day - IN AM (12 Dec 2024 21:37)  niacin 1000 mg oral tablet, extended release: 1 tab(s) orally once a day (at bedtime) (12 Dec 2024 21:37)  Protonix 40 mg oral delayed release tablet: 1 tab(s) orally (12 Dec 2024 21:37)  rosuvastatin 40 mg oral tablet: 1 tab(s) orally once a day (at bedtime) (12 Dec 2024 21:37)  sertraline 50 mg oral tablet: 1 tab(s) orally once a day (12 Dec 2024 21:37)  tamsulosin 0.4 mg oral capsule: 1 cap(s) orally once a day (12 Dec 2024 21:37)  Vascepa 1 g oral capsule: 1 cap(s) orally 2 times a day (12 Dec 2024 21:37)  Vitamin D3 50 mcg (2000 intl units) oral tablet: 1 tab(s) orally once a day (12 Dec 2024 21:37)  ZyPREXA 2.5 mg oral tablet: 1 tab(s) orally once a day (at bedtime) (12 Dec 2024 21:37)    MEDICATIONS  (STANDING):  aspirin  chewable 81 milliGRAM(s) Oral daily  dextrose 5%. 1000 milliLiter(s) (50 mL/Hr) IV Continuous <Continuous>  dextrose 5%. 1000 milliLiter(s) (100 mL/Hr) IV Continuous <Continuous>  dextrose 50% Injectable 25 Gram(s) IV Push once  dextrose 50% Injectable 12.5 Gram(s) IV Push once  dextrose 50% Injectable 25 Gram(s) IV Push once  enoxaparin Injectable 40 milliGRAM(s) SubCutaneous every 24 hours  glucagon  Injectable 1 milliGRAM(s) IntraMuscular once  insulin lispro (ADMELOG) corrective regimen sliding scale   SubCutaneous three times a day before meals  insulin lispro (ADMELOG) corrective regimen sliding scale   SubCutaneous at bedtime  lactated ringers. 1000 milliLiter(s) (70 mL/Hr) IV Continuous <Continuous>  losartan 25 milliGRAM(s) Oral daily  metoprolol succinate ER 25 milliGRAM(s) Oral daily  niacin SR 1000 milliGRAM(s) Oral at bedtime  OLANZapine 2.5 milliGRAM(s) Oral at bedtime  pantoprazole    Tablet 40 milliGRAM(s) Oral before breakfast  piperacillin/tazobactam IVPB.. 3.375 Gram(s) IV Intermittent every 8 hours  rosuvastatin 40 milliGRAM(s) Oral at bedtime  saccharomyces boulardii 250 milliGRAM(s) Oral two times a day  sertraline 50 milliGRAM(s) Oral daily  tamsulosin 0.4 milliGRAM(s) Oral at bedtime  ticagrelor 90 milliGRAM(s) Oral every 12 hours    MEDICATIONS  (PRN):  acetaminophen     Tablet .. 650 milliGRAM(s) Oral every 6 hours PRN Temp greater or equal to 38C (100.4F), Mild Pain (1 - 3)  dextrose Oral Gel 15 Gram(s) Oral once PRN Blood Glucose LESS THAN 70 milliGRAM(s)/deciliter  melatonin 3 milliGRAM(s) Oral at bedtime PRN Insomnia      ALLERGIES:  No Known Allergies        SOCIAL HISTORY:  Tobacco: none  EtOH:  none  Recreational drug use: none  Lives with: wife   Ambulates: none  ADLs: none (12 Dec 2024 20:08)      FAMILY HISTORY:  Hypertension  Mother - Alive age 86    Family history of coronary artery disease in father  Father -  age 86    Family history of prostate cancer in father  Father -  age 86    FHx: stroke  Mother. 86Y        VITAL SIGNS:  Vital Signs Last 24 Hrs  T(C): 36.8 (12 Dec 2024 20:30), Max: 37.1 (12 Dec 2024 16:39)  T(F): 98.2 (12 Dec 2024 20:30), Max: 98.7 (12 Dec 2024 16:39)  HR: 85 (12 Dec 2024 20:30) (85 - 98)  BP: 132/85 (12 Dec 2024 20:30) (132/85 - 147/96)  BP(mean): --  RR: 17 (12 Dec 2024 20:30) (16 - 17)  SpO2: 99% (12 Dec 2024 20:30) (98% - 99%)    Parameters below as of 12 Dec 2024 20:30  Patient On (Oxygen Delivery Method): room air        PHYSICAL EXAM:  General: No acute distress, appears comfortable  Head, Eyes, Ears, Nose, Throat: Normal cephalic/atraumatic, anicteric, conjunctiva-non injected and moist, vision grossly intact, hearing grossly intact, no nasal discharge  Chest: Nonlabored breathing  Heart: Heart rhythm regular  Abdomen: Soft, mild LLQ tenderness  Neuro: responding appropriately      LABS:                        14.4   7.53  )-----------( 197      ( 12 Dec 2024 17:48 )             41.0         139  |  107  |  16  ----------------------------<  98  3.9   |  23  |  1.10    Ca    9.7      12 Dec 2024 17:48    TPro  7.7  /  Alb  3.6  /  TBili  0.5  /  DBili  x   /  AST  42[H]  /  ALT  58  /  AlkPhos  105  12-12    PT/INR - ( 12 Dec 2024 17:48 )   PT: 12.7 sec;   INR: 1.08 ratio         PTT - ( 12 Dec 2024 17:48 )  PTT:29.5 sec  Urinalysis Basic - ( 12 Dec 2024 17:48 )    Color: x / Appearance: x / SG: x / pH: x  Gluc: 98 mg/dL / Ketone: x  / Bili: x / Urobili: x   Blood: x / Protein: x / Nitrite: x   Leuk Esterase: x / RBC: x / WBC x   Sq Epi: x / Non Sq Epi: x / Bacteria: x      LIVER FUNCTIONS - ( 12 Dec 2024 17:48 )  Alb: 3.6 g/dL / Pro: 7.7 g/dL / ALK PHOS: 105 U/L / ALT: 58 U/L / AST: 42 U/L / GGT: x               RADIOLOGY & ADDITIONAL STUDIES:     SURGERY PA CONSULT NOTE:    CHIEF COMPLAINT:  Patient is a 64y old  Male who presents with a chief complaint of Diverticulitis (12 Dec 2024 20:08)      HPI:  63 yo male with PMHx of CAD s/p CABG s/p stent, HTN, HLD who presents to the ED with a cc of abdominal pain. Patient states that beginning  of  he developed a mild left lower quadrant abdominal discomfort.  Patient went to his gastroenterologist who felt that the pain was due to constipation and was placed on Linzess.  As result of continued low-level discomfort without associated symptoms patient was started on Augmentin on the  of this month with no improvement of symptomatology. Patient was then seen in the ED on   and pt was diagnosed with diverticulitis at that time. He was treated with a course of Cipro/Flagyl. 4-5 days later the pain returned. He spoke with his GI and he was started back on Flagyl and Cefpodoxime he began this on Tuesday. He followed up with gastroenterology today for continued pain and pt was sent to the ED for further work up. Denies documented fevers but has had cold chills and sweats. Reports nausea with vomiting with loose stool. Weight loss of 8lbs since . Denies CP, SOB. Pt also notes congestion and cough but his daughters work at a  with similar symptoms.      INTERVAL HPI  HPI as above. Patient reports being started on Augmentin last month by GI due to abdominal pain. Reports no improvement of symptoms and worsening abd pain with Augmentin leading to ED visit on , at which point patient was found to have diverticulitis on CT. Reports being sent home on cipro/flagyl with improvement on pain however reports pain returned after finishing his course. States calling his GI last Tuesday who re-prescribed abx with follow up today. Due to prolonged abdominal pain, patient was advised to return to ED for further evaluation. States having a long hx of diverticulitis for "maybe 10-12 years," and "about 8-9 episodes." Denies ever seeing a surgeon outpatient, but states having an upcoming appt in January. Reports last colonoscopy about 3 years ago which was normal.     PAST MEDICAL & SURGICAL HISTORY:  S/P CABG      CAD (coronary artery disease)  CABG x3      HTN (hypertension)      HLD (hyperlipidemia)      Complex tear of medial meniscus, current injury, left knee, initial encounter      Prediabetes      Arthritis      Back pain      Stented coronary artery  promus premier stent pSVG to OM  and promus premier stent x2 SVG to OM       CAD (Coronary Artery Disease), Nonautologous Biological Bypass Graft      CAD (Coronary Artery Disease) of Artery Bypass Graft   -       Pneumonia due to Organism      Hernia, Inguinal      Hypercholesterolemia      S/P CABG x 3    3vCABG  (LIMA to LAD, SVG to OM, left radial to Diag)   [facility]      Hernia  repaired      Eye abnormality  left eye amblyopia repaired in       Status post coronary artery stent placement  Cardiac Stent Placed 2014 @ [facility]  Cardiac Stent X 2 placed 2017 (piggybacked) @ [facility]      CAD S/P percutaneous coronary angioplasty  baloon angioplasty Sep 2018      REVIEW OF SYSTEMS:  General/Constitutional: No acute distress, no headache, weakness, fevers   HEENT: Denies auditory or visual changes/disturbances, no vertigo  Respiratory: Admits cough, denies wheezing/SOB/dyspnea  Cardiac: Denies chest pain, palpitations  Abdomen: See HPI  Genitourinary: Denies urinary issues or complaints, denies dysuria/hematuria    MEDICATIONS:  Home Medications:  Aspirin Low Strength 81 mg oral tablet: 1 tab(s) orally once a day (at bedtime) (12 Dec 2024 21:37)  Brilinta (ticagrelor) 90 mg oral tablet: 1 tab(s) orally 2 times a day (12 Dec 2024 21:37)  irbesartan 75 mg oral tablet: 1 tab(s) orally once a day - IN AM (12 Dec 2024 21:37)  Jardiance 10 mg oral tablet: 1 tab(s) orally once a day (in the morning) (12 Dec 2024 21:37)  Metoprolol Succinate ER 25 mg oral tablet, extended release: 1 tab(s) orally once a day - IN AM (12 Dec 2024 21:37)  niacin 1000 mg oral tablet, extended release: 1 tab(s) orally once a day (at bedtime) (12 Dec 2024 21:37)  Protonix 40 mg oral delayed release tablet: 1 tab(s) orally (12 Dec 2024 21:37)  rosuvastatin 40 mg oral tablet: 1 tab(s) orally once a day (at bedtime) (12 Dec 2024 21:37)  sertraline 50 mg oral tablet: 1 tab(s) orally once a day (12 Dec 2024 21:37)  tamsulosin 0.4 mg oral capsule: 1 cap(s) orally once a day (12 Dec 2024 21:37)  Vascepa 1 g oral capsule: 1 cap(s) orally 2 times a day (12 Dec 2024 21:37)  Vitamin D3 50 mcg (2000 intl units) oral tablet: 1 tab(s) orally once a day (12 Dec 2024 21:37)  ZyPREXA 2.5 mg oral tablet: 1 tab(s) orally once a day (at bedtime) (12 Dec 2024 21:37)    MEDICATIONS  (STANDING):  aspirin  chewable 81 milliGRAM(s) Oral daily  dextrose 5%. 1000 milliLiter(s) (50 mL/Hr) IV Continuous <Continuous>  dextrose 5%. 1000 milliLiter(s) (100 mL/Hr) IV Continuous <Continuous>  dextrose 50% Injectable 25 Gram(s) IV Push once  dextrose 50% Injectable 12.5 Gram(s) IV Push once  dextrose 50% Injectable 25 Gram(s) IV Push once  enoxaparin Injectable 40 milliGRAM(s) SubCutaneous every 24 hours  glucagon  Injectable 1 milliGRAM(s) IntraMuscular once  insulin lispro (ADMELOG) corrective regimen sliding scale   SubCutaneous three times a day before meals  insulin lispro (ADMELOG) corrective regimen sliding scale   SubCutaneous at bedtime  lactated ringers. 1000 milliLiter(s) (70 mL/Hr) IV Continuous <Continuous>  losartan 25 milliGRAM(s) Oral daily  metoprolol succinate ER 25 milliGRAM(s) Oral daily  niacin SR 1000 milliGRAM(s) Oral at bedtime  OLANZapine 2.5 milliGRAM(s) Oral at bedtime  pantoprazole    Tablet 40 milliGRAM(s) Oral before breakfast  piperacillin/tazobactam IVPB.. 3.375 Gram(s) IV Intermittent every 8 hours  rosuvastatin 40 milliGRAM(s) Oral at bedtime  saccharomyces boulardii 250 milliGRAM(s) Oral two times a day  sertraline 50 milliGRAM(s) Oral daily  tamsulosin 0.4 milliGRAM(s) Oral at bedtime  ticagrelor 90 milliGRAM(s) Oral every 12 hours    MEDICATIONS  (PRN):  acetaminophen     Tablet .. 650 milliGRAM(s) Oral every 6 hours PRN Temp greater or equal to 38C (100.4F), Mild Pain (1 - 3)  dextrose Oral Gel 15 Gram(s) Oral once PRN Blood Glucose LESS THAN 70 milliGRAM(s)/deciliter  melatonin 3 milliGRAM(s) Oral at bedtime PRN Insomnia      ALLERGIES:  No Known Allergies        SOCIAL HISTORY:  Tobacco: none  EtOH:  none  Recreational drug use: none  Lives with: wife   Ambulates: none  ADLs: none (12 Dec 2024 20:08)      FAMILY HISTORY:  Hypertension  Mother - Alive age 86    Family history of coronary artery disease in father  Father -  age 86    Family history of prostate cancer in father  Father -  age 86    FHx: stroke  Mother. 86Y        VITAL SIGNS:  Vital Signs Last 24 Hrs  T(C): 36.8 (12 Dec 2024 20:30), Max: 37.1 (12 Dec 2024 16:39)  T(F): 98.2 (12 Dec 2024 20:30), Max: 98.7 (12 Dec 2024 16:39)  HR: 85 (12 Dec 2024 20:30) (85 - 98)  BP: 132/85 (12 Dec 2024 20:30) (132/85 - 147/96)  RR: 17 (12 Dec 2024 20:30) (16 - 17)  SpO2: 99% (12 Dec 2024 20:30) (98% - 99%)    Parameters below as of 12 Dec 2024 20:30  Patient On (Oxygen Delivery Method): room air        PHYSICAL EXAM:  General: No acute distress, appears comfortable  Head, Eyes, Ears, Nose, Throat: Normal cephalic/atraumatic, anicteric, conjunctiva-non injected and moist, vision grossly intact, hearing grossly intact, no nasal discharge  Chest: Nonlabored breathing  Heart: Heart rhythm regular  Abdomen: Soft, mild LLQ tenderness  Neuro: responding appropriately      LABS:                        14.4   7.53  )-----------( 197      ( 12 Dec 2024 17:48 )             41.0         139  |  107  |  16  ----------------------------<  98  3.9   |  23  |  1.10    Ca    9.7      12 Dec 2024 17:48    TPro  7.7  /  Alb  3.6  /  TBili  0.5  /  DBili  x   /  AST  42[H]  /  ALT  58  /  AlkPhos  105  12-12    PT/INR - ( 12 Dec 2024 17:48 )   PT: 12.7 sec;   INR: 1.08 ratio         PTT - ( 12 Dec 2024 17:48 )  PTT:29.5 sec  Urinalysis Basic - ( 12 Dec 2024 17:48 )    Color: x / Appearance: x / SG: x / pH: x  Gluc: 98 mg/dL / Ketone: x  / Bili: x / Urobili: x   Blood: x / Protein: x / Nitrite: x   Leuk Esterase: x / RBC: x / WBC x   Sq Epi: x / Non Sq Epi: x / Bacteria: x      LIVER FUNCTIONS - ( 12 Dec 2024 17:48 )  Alb: 3.6 g/dL / Pro: 7.7 g/dL / ALK PHOS: 105 U/L / ALT: 58 U/L / AST: 42 U/L / GGT: x               RADIOLOGY & ADDITIONAL STUDIES:  < from: CT Abdomen and Pelvis w/ IV Cont (24 @ 18:34) >  ACC: [id number] EXAM:  CT ABDOMEN AND PELVIS IC   ORDERED BY: LEO SANTIAGO     PROCEDURE DATE:  2024          INTERPRETATION:  CLINICAL INFORMATION: Worsening abdominal pain. Recent   treatment for diverticulitis.    COMPARISON: CT abdomen/pelvis 2024    CONTRAST/COMPLICATIONS:  IV Contrast: Omnipaque 350  90 cc administered   10 cc discarded  Oral Contrast: NONE  .    PROCEDURE:  CT of the Abdomen and Pelvis was performed.  Sagittal and coronal reformats were performed.    FINDINGS:  LOWER CHEST: Within normal limits.    LIVER: Within normal limits.  BILE DUCTS: Normal caliber.  GALLBLADDER: Within normal limits.  SPLEEN: Within normal limits.  PANCREAS: Within normal limits.  ADRENALS: Within normal limits.  KIDNEYS/URETERS: No renal stones or hydronephrosis.    BLADDER: Within normal limits.  REPRODUCTIVE ORGANS: Prostate is enlarged.    BOWEL: No bowel obstruction. Colonic diverticulosis. New inflammatory   changes surrounding diverticula in the distal descending colon. Minimal   residual wall thickening and inflammatory changes in the proximal sigmoid   colon.  PERITONEUM/RETROPERITONEUM: Within normal limits.  VESSELS: Atherosclerotic changes.  LYMPH NODES: No lymphadenopathy.  ABDOMINAL WALL: Within normal limits.  BONES: Degenerative changes.    IMPRESSION:  Acute uncomplicated diverticulitis of the distal descending colon, new   compared to 2024.    Minimal residual wall thickening and inflammation surrounding the   proximal sigmoid colon, consistent with resolving sigmoid diverticulitis.      --- End of Report ---       ANDREW BECERRA MD; Attending Radiologist  This document has been electronically signed. Dec 12 2024  7:02PM    < end of copied text >

## 2024-12-12 NOTE — H&P ADULT - NSHPSOCIALHISTORY_GEN_ALL_CORE
Tobacco:   EtOH:    Recreational drug use:   Lives with:   Ambulates:   ADLs: Tobacco: none  EtOH:  none  Recreational drug use: none  Lives with: wife   Ambulates: none  ADLs: none

## 2024-12-12 NOTE — ED ADULT TRIAGE NOTE - CHIEF COMPLAINT QUOTE
H/O Diverticulitis. Was seen and treated here 2 weeks ago. Was seen by gastro today. send in here for abscess.

## 2024-12-12 NOTE — H&P ADULT - CLICK TO LAUNCH ORM
Quality 226: Preventive Care And Screening: Tobacco Use: Screening And Cessation Intervention: Patient screened for tobacco use and is an ex/non-smoker Detail Level: Detailed Quality 130: Documentation Of Current Medications In The Medical Record: Current Medications Documented Quality 431: Preventive Care And Screening: Unhealthy Alcohol Use - Screening: Patient screened for unhealthy alcohol use using a single question and scores less than 2 times per year .

## 2024-12-12 NOTE — ED PROVIDER NOTE - TEMPLATE, MLM
PCP: Carine Cifuentes DO     Chief Complaint:   Chief Complaint   Patient presents with   • Vomiting        History of Present Illness: Viry Dejesus is a 22 year old female  with below mentioned past medical history, most significant for POTS, Ehler-s Danlos syndrome, IBS, and mast cell activation, presents to the ER today 8/18 with abdominal pain and n/v. Patient has had multiple admission over the last few months with similar complaints and found to be possible mast cell activation flare. Patient unable to tolerate PO, last meal was a small salad yesterday evening. She did have BM this morning.     In ER, blood work revealed hypokalemia, patient hypotensive. Started on IVF and admitted for closer monitoring.     Patient seen and examined in room, lying in high-chaudhary's position. Patient awake, alert and oriented x3. Feeling better, denies n/v at this time. No family present at bedside.       Past Medical History:    Past Medical History:   Diagnosis Date   • Anxiety    • Colitis    • Depression    • Fatty liver    • GERD (gastroesophageal reflux disease)    • POTS (postural orthostatic tachycardia syndrome)           Surgical History:   Past Surgical History:   Procedure Laterality Date   • Chest surgery      breast reduction   • Nose surgery     • Orif forearm fracture Right    • Tonsillectomy            Family History:   Family History   Problem Relation Age of Onset   • Cancer Maternal Grandmother    • Cancer Maternal Grandfather    • Cancer Paternal Grandmother    • Cancer Paternal Grandfather           Social History:   Alcohol Use: Never              Drug Use:    Yes                Special: Marijuana     Social History     Tobacco Use   Smoking Status Never Smoker   Smokeless Tobacco Never Used          Allergies:   ALLERGIES:   Allergen Reactions   • Lactose Intolerance   (Food Or Med) Other (See Comments)   • Mouse Epithelium HIVES     Allergies to all animals   • Seasonal Runny Nose           Medications: See EMR. Medication Reconciliation Form Reviewed    Review of Systems:  General: Denies fever, chills, or malaise.  Skin: Denies pruritus or rashes.  Head and Neck: Denies neck pain or stiffness.  No headaches.  EENT: Denies visual changes, diplopia or blurred vision. Denies current congestion, runny nose or sneezing.  Chest and Lungs: Denies difficulty breathing or shortness of breath with or without exertion.  No sputum changes.  Cardiovascular: Denies chest pain or palpitations.  No edema or claudication.  Gastrointestinal: Abdominal pain, n/v.  No hematemesis or blood in stools.  Genitourinary: Denies flank pain or dysuria.  No hematuria.  Musculoskeletal: Denies joint pain, redness or swelling.  Denies difficulty with range of motion.  Neurologic: Denies dizziness or syncope.      Physical Examination:   Mental Status: Alert and oriented.  Follows commands appropriately.    Vitals:    08/18/21 1228   BP: 114/50   Pulse: (!) 56   Resp: 16   Temp: 97.7 °F (36.5 °C)        ENT: PERRL. EOM intact  Neck: Supple, No JVD  Respiratory: Equal air entry. Clear to auscultation bilaterally  CV: S1, S2. RRR.  GI: Abdomen soft, nontender.  Active bowel sounds in all four quadrants.  Extremities: No peripheral edema.  Skin: Pink, warm and dry.  Neuro: Speech clear. Equal strength bilaterally.    Labs:   Recent Results (from the past 24 hour(s))   Comprehensive Metabolic Panel    Collection Time: 08/18/21  7:47 AM   Result Value Ref Range    Fasting Status      Sodium 140 135 - 145 mmol/L    Potassium 3.3 (L) 3.4 - 5.1 mmol/L    Chloride 102 98 - 107 mmol/L    Carbon Dioxide 21 21 - 32 mmol/L    Anion Gap 20 10 - 20 mmol/L    Glucose 123 (H) 65 - 99 mg/dL    BUN 11 6 - 20 mg/dL    Creatinine 0.66 0.51 - 0.95 mg/dL    Glomerular Filtration Rate >90 >90 mL/min/1.73m2    BUN/ Creatinine Ratio 17 7 - 25    Calcium 9.6 8.4 - 10.2 mg/dL    Bilirubin, Total 0.8 0.2 - 1.0 mg/dL    GOT/AST 19 <=37 Units/L    GPT/ALT  23 <64 Units/L    Alkaline Phosphatase 80 45 - 117 Units/L    Albumin 4.5 3.6 - 5.1 g/dL    Protein, Total 8.7 (H) 6.4 - 8.2 g/dL    Globulin 4.2 (H) 2.0 - 4.0 g/dL    A/G Ratio 1.1 1.0 - 2.4   Lactic Acid, Venous    Collection Time: 08/18/21  7:47 AM   Result Value Ref Range    Lactate, Venous 1.4 0.0 - 2.0 mmol/L   Lipase    Collection Time: 08/18/21  7:47 AM   Result Value Ref Range    Lipase 129 73 - 393 Units/L   Magnesium    Collection Time: 08/18/21  7:47 AM   Result Value Ref Range    Magnesium 1.9 1.7 - 2.4 mg/dL   Blood Culture    Collection Time: 08/18/21  7:47 AM    Specimen: Blood   Result Value Ref Range    Culture, Blood or Bone Marrow No Growth <24 hours    CBC with Automated Differential (performable only)    Collection Time: 08/18/21  7:47 AM   Result Value Ref Range    WBC 10.7 4.2 - 11.0 K/mcL    RBC 5.09 4.00 - 5.20 mil/mcL    HGB 13.8 12.0 - 15.5 g/dL    HCT 42.6 36.0 - 46.5 %    MCV 83.7 78.0 - 100.0 fl    MCH 27.1 26.0 - 34.0 pg    MCHC 32.4 32.0 - 36.5 g/dL    RDW-CV 11.9 11.0 - 15.0 %    RDW-SD 36.4 (L) 39.0 - 50.0 fL     140 - 450 K/mcL    NRBC 0 <=0 /100 WBC    Neutrophil, Percent 68 %    Lymphocytes, Percent 25 %    Mono, Percent 4 %    Eosinophils, Percent 2 %    Basophils, Percent 1 %    Immature Granulocytes 0 %    Absolute Neutrophils 7.1 1.8 - 8.0 K/mcL    Absolute Lymphocytes 2.7 1.2 - 5.2 K/mcL    Absolute Monocytes 0.5 0.3 - 0.9 K/mcL    Absolute Eosinophils  0.2 0.0 - 0.5 K/mcL    Absolute Basophils 0.1 0.0 - 0.3 K/mcL    Absolute Immmature Granulocytes 0.0 0.0 - 0.2 K/mcL   Pregnancy Test, Serum Qualitative    Collection Time: 08/18/21  7:48 AM   Result Value Ref Range    HCG, Qualitative Negative Negative   Urinalysis & Reflex Microscopy With Culture If Indicated    Collection Time: 08/18/21 10:52 AM   Result Value Ref Range    COLOR, URINALYSIS Yellow     APPEARANCE, URINALYSIS Hazy     GLUCOSE, URINALYSIS Negative Negative mg/dL    BILIRUBIN, URINALYSIS Negative  Negative    KETONES, URINALYSIS >80 (A) Negative mg/dL    SPECIFIC GRAVITY, URINALYSIS 1.025 1.005 - 1.030    OCCULT BLOOD, URINALYSIS Negative Negative    PH, URINALYSIS 7.5 (H) 5.0 - 7.0    PROTEIN, URINALYSIS Negative Negative mg/dL    UROBILINOGEN, URINALYSIS 0.2 0.2, 1.0 mg/dL    NITRITE, URINALYSIS Negative Negative    LEUKOCYTE ESTERASE, URINALYSIS Negative Negative   Rapid SARS-CoV-2 by PCR    Collection Time: 08/18/21 10:53 AM    Specimen: Nasopharyngeal; Swab   Result Value Ref Range    Rapid SARS-COV-2 by PCR Not Detected Not Detected / Detected / Presumptive Positive / Inhibitors present    Isolation Guidelines      Procedural Comment            Assessment/ Plan:  • Possible mast cell activation flare  Possible gastroparesis   With intractable n/v and abdominal pain - likely MSK d/t dry heaving   Trial clear liquids for today, advance slowly starting tomorrow   Trial Reglan 5 mg TID     • History of POTS     • History of Génesis-Danlos syndrome     • Depression/anxiety   Continue supportive care      ·  Marijuana use   Patient denies daily use   Cessation recommended         Next Steps: trial clear liquids.    Disposition: pending on course of treatment       Pt. seen and evaluated and management undertaken in conjunction with Dr. Zuleta at bedside    Lata Chowdhury, DHEERAJ      General

## 2024-12-12 NOTE — H&P ADULT - HISTORY OF PRESENT ILLNESS
Pt is a 63 yo male who presents to the ED with a cc of abdominal pain. PMHx of CAD s/p CABG s/p stent, HTN, HLD. Patient states that beginning November 5 of  he developed a mild left lower quadrant abdominal discomfort.  Patient went to his gastroenterologist who felt that the pain was due to constipation and was placed on Linzess.  As result of continued low-level discomfort without associated symptoms patient was started on Augmentin on the 20th of this month with no improvement of symptomatology. Patient was then seen in the ED on 11/22  and pt was diagnosed with diverticulitis at that time. He was treated with a course of Cipro/Flagyl. 4-5 days later the pain returned. He spoke with his GI and he was started back on Flagyl and Cefpodoxime he began this on Tuesday. He followed up with gastroenterology today for continued pain and pt was sent to the ED for further work up and elv. Denies documented fevers but has had cold chills and sweats. Reports nausea with vomiting with loose stool. Denies CP, SOB. Pt also notes congestion and cough but his daughters work at a  with similar symptoms.    ED course  Vitals: T 98.7, HR 98, /96, RR 16, SpO2 98% on RA  Significant labs: WBC 7.53, lactate 1.3, Cr 1.1, lipase 54  Imaging:   CT abdomen and pelvis with IV contrast shows Acute uncomplicated diverticulitis of the distal descending colon, new   compared to 11/22/2024. Minimal residual wall thickening and inflammation surrounding the proximal sigmoid colon, consistent with resolving sigmoid diverticulitis.  EKG:   In ED patient given: Zofran 4mg IVP x1, 2.3L NS bolus x1, Zosyn x1    65 yo male with PMHx of CAD s/p CABG s/p stent, HTN, HLD who presents to the ED with a cc of abdominal pain. Patient states that beginning November 5 of  he developed a mild left lower quadrant abdominal discomfort.  Patient went to his gastroenterologist who felt that the pain was due to constipation and was placed on Linzess.  As result of continued low-level discomfort without associated symptoms patient was started on Augmentin on the 20th of this month with no improvement of symptomatology. Patient was then seen in the ED on 11/22  and pt was diagnosed with diverticulitis at that time. He was treated with a course of Cipro/Flagyl. 4-5 days later the pain returned. He spoke with his GI and he was started back on Flagyl and Cefpodoxime he began this on Tuesday. He followed up with gastroenterology today for continued pain and pt was sent to the ED for further work up. Denies documented fevers but has had cold chills and sweats. Reports nausea with vomiting with loose stool. Weight loss of 8lbs since 11/5. Denies CP, SOB. Pt also notes congestion and cough but his daughters work at a  with similar symptoms.    ED course  Vitals: T 98.7, HR 98, /96, RR 16, SpO2 98% on RA  Significant labs: WBC 7.53, lactate 1.3, Cr 1.1, lipase 54  Imaging:   CT abdomen and pelvis with IV contrast shows Acute uncomplicated diverticulitis of the distal descending colon, new   compared to 11/22/2024. Minimal residual wall thickening and inflammation surrounding the proximal sigmoid colon, consistent with resolving sigmoid diverticulitis.  EKG: - EKG on admission NSR 95bpm; QTc 454 ms   In ED patient given: Zofran 4mg IVP x1, 2.3L NS bolus x1, Zosyn x1

## 2024-12-12 NOTE — H&P ADULT - NSHPPHYSICALEXAM_GEN_ALL_CORE
T(C): 37.1 (12-12-24 @ 16:39), Max: 37.1 (12-12-24 @ 16:39)  HR: 98 (12-12-24 @ 16:39) (98 - 98)  BP: 147/96 (12-12-24 @ 16:39) (147/96 - 147/96)  RR: 16 (12-12-24 @ 16:39) (16 - 16)  SpO2: 98% (12-12-24 @ 16:39) (98% - 98%)    GENERAL: patient appears well, no acute distress, appropriately interactive  EYES: sclera clear, no exudates  ENMT: oropharynx clear without erythema, no exudates, moist mucous membranes  NECK: supple, soft  LUNGS: good air entry bilaterally, clear to auscultation, symmetric breath sounds, no wheezing or rhonchi appreciated  HEART: soft S1/S2, regular rate and rhythm, no murmurs noted, no lower extremity edema  GASTROINTESTINAL: abdomen is soft, nontender, nondistended, normoactive bowel sounds  INTEGUMENT: good skin turgor, warm skin, appears well perfused  MUSCULOSKELETAL: no clubbing or cyanosis, no obvious deformity  NEUROLOGIC: awake, alert, oriented x3, good muscle tone in all 4 extremities  HEME/LYMPH: no obvious ecchymosis or petechiae T(C): 37.1 (12-12-24 @ 16:39), Max: 37.1 (12-12-24 @ 16:39)  HR: 98 (12-12-24 @ 16:39) (98 - 98)  BP: 147/96 (12-12-24 @ 16:39) (147/96 - 147/96)  RR: 16 (12-12-24 @ 16:39) (16 - 16)  SpO2: 98% (12-12-24 @ 16:39) (98% - 98%)    GENERAL: patient appears well, no acute distress, appropriately interactive  EYES: sclera clear, no exudates  ENMT: oropharynx clear without erythema, no exudates, moist mucous membranes  NECK: supple, soft  LUNGS: good air entry bilaterally, clear to auscultation, symmetric breath sounds, no wheezing or rhonchi appreciated  HEART: soft S1/S2, regular rate and rhythm, no murmurs noted, no lower extremity edema  GASTROINTESTINAL: abdomen is soft, LLQ TTP, nondistended, normoactive bowel sounds  INTEGUMENT: good skin turgor, warm skin, appears well perfused  MUSCULOSKELETAL: no clubbing or cyanosis, no obvious deformity  NEUROLOGIC: awake, alert, oriented x3, good muscle tone in all 4 extremities  HEME/LYMPH: no obvious ecchymosis or petechiae

## 2024-12-12 NOTE — H&P ADULT - NSHPREVIEWOFSYSTEMS_GEN_ALL_CORE
CONSTITUTIONAL: denies fever, chills, fatigue, weakness  HEENT: denies blurred vision, sore throat  SKIN: denies new lesions, rash  CARDIOVASCULAR: denies chest pain, chest pressure, palpitations  RESPIRATORY: denies shortness of breath, cough, sputum production  GASTROINTESTINAL: +abdominal pain denies nausea, vomiting, diarrhea, melena or hematochezia  GENITOURINARY: denies dysuria, discharge  NEUROLOGICAL: denies numbness, headache, focal weakness  MUSCULOSKELETAL: denies new joint pain, muscle aches  HEMATOLOGIC: denies gross bleeding, bruising

## 2024-12-12 NOTE — ED ADULT NURSE NOTE - OBJECTIVE STATEMENT
Pt. received alert and oriented x4 with chief complaint of worsening left sided abdominal pain over last few days w/ N/V post diagnosis of diverticulitis from two weeks ago. Pt. finish abx four days ago.

## 2024-12-12 NOTE — CONSULT NOTE ADULT - ASSESSMENT
64yM with PMHx of CAD s/p CABG s/p stent on brilinta, HTN, HLD who presents to the ED with of intermittent abdominal pain since November 5. VSSAF. Labs unremarkable. CT demonstrating acute uncomplicated diverticulitis of the distal descending colon, new compared to 11/22/2024. Minimal residual wall thickening and inflammation surrounding the proximal sigmoid colon, consistent with resolving sigmoid diverticulitis.    - Admitted to medicine  - CLD  - Continue abx  - Pain and nausea control  - Case discussed with Dr. Townsend 64yM with PMHx of CAD s/p CABG s/p stent, HTN, HLD who presents to the ED with of intermittent abdominal pain since November 5. VSSAF. Labs unremarkable. CT demonstrating acute uncomplicated diverticulitis of the distal descending colon, new compared to 11/22/2024. Minimal residual wall thickening and inflammation surrounding the proximal sigmoid colon, consistent with resolving sigmoid diverticulitis.    - Admitted to medicine  - CLD  - Continue abx  - Pain and nausea control  - Case discussed with Dr. Townsend

## 2024-12-12 NOTE — H&P ADULT - ATTENDING COMMENTS
pt was seen and examined at bedside.   This is a  65 yo male who presents to the ED with a cc of abdominal pain. PMHx of CAD s/p CABG s/p stent, HTN, HLD. pt was recenly discharged from [facility] ED for Acute diverticulitis and treated with CIpro+ Flagyl. Pts symptoms were not completely resolved by then, which was then treated with Flagyl and Cefpodoxime. However, he was sent to ED by his GI physician today as he is still experiencing Abdominal pain. In ED,  CT imaging revealed acute uncomplicated diverticulitis of the distal descending colon new mild residual thickening of the sigmoid colon. admitted to medicine for further management.     PE  Gen: AAOx3, NAD  CVS:s1s2 heard, no murmers  PULM: CTA bilateral, NO rales or rhonchi  GI: LLQ tenderness on palpation, soft, non distended abdomen    A/P  1) Abdominal pain   CT revealing new area of Diverticulitis  continue Zosyn  ID consult as pt has been on multiple antibiotics in the last 5 weeks.   Surgery consult   pain control as needed   2) other chronic issues  continue home medications    Case discussed with resident   Agree with above

## 2024-12-12 NOTE — ED PROVIDER NOTE - DIFFERENTIAL DIAGNOSIS
Differential Diagnosis Patient presenting to the emergency room with continued abdominal pain in the setting of recently treated diverticulitis.  Differential includes recurrent episode versus possible abscess versus perforation.  Will obtain screening septic workup CT imaging hydrate medicate for pain begin antibiotics as needed and monitor.  Ultimate clinical disposition will be pending results.

## 2024-12-12 NOTE — ED PROVIDER NOTE - CLINICAL SUMMARY MEDICAL DECISION MAKING FREE TEXT BOX
Pt is a 63 yo male who presents to the ED with a cc of abdominal pain. PMHx of CAD s/p CABG s/p stent, HTN, HLD. Patient states that beginning November 5 of  he developed a mild left lower quadrant abdominal discomfort.  Patient went to his gastroenterologist who felt that the pain was due to constipation and was placed on Linzess.  As result of continued low-level discomfort without associated symptoms patient was started on Augmentin on the 20th of this month with no improvement of symptomatology. Patient was then seen in the ED on 11/22  and pt was diagnosed with diverticulitis at that time. He was treated with a course of Cipro/Flagyl. 4-5 days later the pain returned. He spoke with his GI and he was started back on Flagyl and Cefpodoxime he began this on Tuesday. He followed up with gastroenterology today for continued pain and pt was sent to the ED for further work up and elv. Denies documented fevers but has had cold chills and sweats. Reports nausea with vomiting with loose stool. Denies CP, SOB. Pt also notes congestion and cough but his daughters work at a  with similar symptoms. Patient presenting to the emergency room with continued abdominal pain in the setting of recently treated diverticulitis.  Differential includes recurrent episode versus possible abscess versus perforation.  Will obtain screening septic workup CT imaging hydrate medicate for pain begin antibiotics as needed and monitor.  Ultimate clinical disposition will be pending results. Results of labs reviewed patient with a normal white count no evidence of anemia coags noted CMP noted CT imaging reveals acute uncomplicated diverticulitis of the distal descending colon new mild residual thickening of the sigmoid colon no admit at this time for this.

## 2024-12-12 NOTE — H&P ADULT - NSICDXPASTSURGICALHX_GEN_ALL_CORE_FT
PAST SURGICAL HISTORY:  CAD (Coronary Artery Disease) of Artery Bypass Graft 2005 - March 17th    CAD (Coronary Artery Disease), Nonautologous Biological Bypass Graft     CAD S/P percutaneous coronary angioplasty baloon angioplasty Sep 2018    Eye abnormality left eye amblyopia repaired in 1994    Hernia repaired    Hernia, Inguinal     Hypercholesterolemia     Pneumonia due to Organism     S/P CABG x 3 2005  3vCABG 2005 (LIMA to LAD, SVG to OM, left radial to Diag)   NYQ    Status post coronary artery stent placement Cardiac Stent Placed April 30th 2014 @ [facility]  Cardiac Stent X 2 placed January 15th 2017 (piggybacked) @ [facility]

## 2024-12-12 NOTE — ED ADULT NURSE NOTE - NSFALLUNIVINTERV_ED_ALL_ED
Bed/Stretcher in lowest position, wheels locked, appropriate side rails in place/Call bell, personal items and telephone in reach/Instruct patient to call for assistance before getting out of bed/chair/stretcher/Non-slip footwear applied when patient is off stretcher/[place] to call system/Physically safe environment - no spills, clutter or unnecessary equipment/Purposeful proactive rounding/Room/bathroom lighting operational, light cord in reach

## 2024-12-12 NOTE — ED PROVIDER NOTE - OBJECTIVE STATEMENT
Pt is a 65 yo male who presents to the ED with a cc of abdominal pain. Pt is a 65 yo male who presents to the ED with a cc of abdominal pain. PMHx of CAD s/p CABG s/p stent, HTN, HLD. Patient states that beginning November 5 of  he developed a mild left lower quadrant abdominal discomfort.  Patient went to his gastroenterologist who felt that the pain was due to constipation and was placed on Linzess.  As result of continued low-level discomfort without associated symptoms patient was started on Augmentin on the 20th of this month with no improvement of symptomatology. Patient was then seen in the ED on 11/22  and pt was diagnosed with diverticulitis at that time. He was treated with a course of Cipro/Flagyl. 4-5 days later the pain returned. He spoke with his GI and he was started back on Flagyl and Cefpodoxime he began this on Tuesday. He followed up with gastroenterology today for continued pain and pt was sent to the ED for further work up and elv. Denies documented fevers but has had cold chills and sweats. Reports nausea with vomiting with loose stool. Denies CP, SOB. Pt also notes congestion and cough but his daughters work at a  with similar symptoms.

## 2024-12-12 NOTE — H&P ADULT - PROBLEM SELECTOR PLAN 3
Chronic; s/p CABG s/p stent  - EKG on admission NSR 95bpm; QTc 454 ms   - continue ASA 81mg, Brillinta, crestor

## 2024-12-12 NOTE — H&P ADULT - PROBLEM SELECTOR PLAN 1
Pt presented to the ED for abdominal pain   - CT abdomen and pelvis with IV contrast shows Acute uncomplicated diverticulitis of the distal descending colon, new   compared to 11/22/2024. Minimal residual wall thickening and inflammation surrounding the proximal sigmoid colon, consistent with resolving sigmoid diverticulitis.  - s/p outpatient course of augmentin, cipro/flagyl, and flagyl/cefpodoxime   - s/p Zofran 4mg IVP x1, 2.3L NS bolus x1, Zosyn x1 in the ED   - On admission,  WBC 7.53, lactate 1.3, Cr 1.1, lipase 54  - pt does not meet sepsis criteria on admission   - continue cipro/flagyl  - bowel rest, NPO   - IV hydration  - GI, Dr. Son, consulted; f/u recs  - Surgery consulted; f/u recs Pt presented to the ED for abdominal pain   - CT abdomen and pelvis with IV contrast shows Acute uncomplicated diverticulitis of the distal descending colon, new   compared to 11/22/2024. Minimal residual wall thickening and inflammation surrounding the proximal sigmoid colon, consistent with resolving sigmoid diverticulitis.  - s/p outpatient course of augmentin, cipro/flagyl, and flagyl/cefpodoxime   - s/p Zofran 4mg IVP x1, 2.3L NS bolus x1, Zosyn x1 in the ED   - On admission,  WBC 7.53, lactate 1.3, Cr 1.1, lipase 54  - pt does not meet sepsis criteria on admission   - continue zosyn  - bowel rest, clear liquid diet   - IV hydration  - GI, Dr. Son, consulted; f/u recs  - Surgery consulted; f/u recs  - ID consulted, Dr. Nichols, f/u recs

## 2024-12-13 ENCOUNTER — TRANSCRIPTION ENCOUNTER (OUTPATIENT)
Age: 64
End: 2024-12-13

## 2024-12-13 DIAGNOSIS — D64.9 ANEMIA, UNSPECIFIED: ICD-10-CM

## 2024-12-13 LAB
A1C WITH ESTIMATED AVERAGE GLUCOSE RESULT: 6.1 % — HIGH (ref 4–5.6)
ALBUMIN SERPL ELPH-MCNC: 3.1 G/DL — LOW (ref 3.3–5)
ALP SERPL-CCNC: 84 U/L — SIGNIFICANT CHANGE UP (ref 40–120)
ALT FLD-CCNC: 41 U/L — SIGNIFICANT CHANGE UP (ref 12–78)
ANION GAP SERPL CALC-SCNC: 8 MMOL/L — SIGNIFICANT CHANGE UP (ref 5–17)
AST SERPL-CCNC: 28 U/L — SIGNIFICANT CHANGE UP (ref 15–37)
BASOPHILS # BLD AUTO: 0.02 K/UL — SIGNIFICANT CHANGE UP (ref 0–0.2)
BASOPHILS NFR BLD AUTO: 0.3 % — SIGNIFICANT CHANGE UP (ref 0–2)
BILIRUB SERPL-MCNC: 0.5 MG/DL — SIGNIFICANT CHANGE UP (ref 0.2–1.2)
BUN SERPL-MCNC: 12 MG/DL — SIGNIFICANT CHANGE UP (ref 7–23)
CALCIUM SERPL-MCNC: 8.7 MG/DL — SIGNIFICANT CHANGE UP (ref 8.5–10.1)
CHLORIDE SERPL-SCNC: 110 MMOL/L — HIGH (ref 96–108)
CO2 SERPL-SCNC: 22 MMOL/L — SIGNIFICANT CHANGE UP (ref 22–31)
CREAT SERPL-MCNC: 0.86 MG/DL — SIGNIFICANT CHANGE UP (ref 0.5–1.3)
EGFR: 98 ML/MIN/1.73M2 — SIGNIFICANT CHANGE UP
EOSINOPHIL # BLD AUTO: 0.12 K/UL — SIGNIFICANT CHANGE UP (ref 0–0.5)
EOSINOPHIL NFR BLD AUTO: 2 % — SIGNIFICANT CHANGE UP (ref 0–6)
ESTIMATED AVERAGE GLUCOSE: 128 MG/DL — HIGH (ref 68–114)
GLUCOSE SERPL-MCNC: 87 MG/DL — SIGNIFICANT CHANGE UP (ref 70–99)
HCT VFR BLD CALC: 35.2 % — LOW (ref 39–50)
HGB BLD-MCNC: 12.1 G/DL — LOW (ref 13–17)
IMM GRANULOCYTES NFR BLD AUTO: 0.3 % — SIGNIFICANT CHANGE UP (ref 0–0.9)
LYMPHOCYTES # BLD AUTO: 2.24 K/UL — SIGNIFICANT CHANGE UP (ref 1–3.3)
LYMPHOCYTES # BLD AUTO: 37.6 % — SIGNIFICANT CHANGE UP (ref 13–44)
MCHC RBC-ENTMCNC: 30.5 PG — SIGNIFICANT CHANGE UP (ref 27–34)
MCHC RBC-ENTMCNC: 34.4 G/DL — SIGNIFICANT CHANGE UP (ref 32–36)
MCV RBC AUTO: 88.7 FL — SIGNIFICANT CHANGE UP (ref 80–100)
MONOCYTES # BLD AUTO: 0.45 K/UL — SIGNIFICANT CHANGE UP (ref 0–0.9)
MONOCYTES NFR BLD AUTO: 7.6 % — SIGNIFICANT CHANGE UP (ref 2–14)
NEUTROPHILS # BLD AUTO: 3.1 K/UL — SIGNIFICANT CHANGE UP (ref 1.8–7.4)
NEUTROPHILS NFR BLD AUTO: 52.2 % — SIGNIFICANT CHANGE UP (ref 43–77)
NRBC # BLD: 0 /100 WBCS — SIGNIFICANT CHANGE UP (ref 0–0)
PLATELET # BLD AUTO: 161 K/UL — SIGNIFICANT CHANGE UP (ref 150–400)
POTASSIUM SERPL-MCNC: 3.5 MMOL/L — SIGNIFICANT CHANGE UP (ref 3.5–5.3)
POTASSIUM SERPL-SCNC: 3.5 MMOL/L — SIGNIFICANT CHANGE UP (ref 3.5–5.3)
PROT SERPL-MCNC: 6 G/DL — SIGNIFICANT CHANGE UP (ref 6–8.3)
RBC # BLD: 3.97 M/UL — LOW (ref 4.2–5.8)
RBC # FLD: 14.2 % — SIGNIFICANT CHANGE UP (ref 10.3–14.5)
SODIUM SERPL-SCNC: 140 MMOL/L — SIGNIFICANT CHANGE UP (ref 135–145)
WBC # BLD: 5.95 K/UL — SIGNIFICANT CHANGE UP (ref 3.8–10.5)
WBC # FLD AUTO: 5.95 K/UL — SIGNIFICANT CHANGE UP (ref 3.8–10.5)

## 2024-12-13 PROCEDURE — 99222 1ST HOSP IP/OBS MODERATE 55: CPT

## 2024-12-13 PROCEDURE — 99233 SBSQ HOSP IP/OBS HIGH 50: CPT

## 2024-12-13 RX ORDER — SODIUM CHLORIDE 9 MG/ML
1000 INJECTION, SOLUTION INTRAMUSCULAR; INTRAVENOUS; SUBCUTANEOUS
Refills: 0 | Status: DISCONTINUED | OUTPATIENT
Start: 2024-12-13 | End: 2024-12-15

## 2024-12-13 RX ORDER — POTASSIUM CHLORIDE 600 MG/1
40 TABLET, EXTENDED RELEASE ORAL ONCE
Refills: 0 | Status: DISCONTINUED | OUTPATIENT
Start: 2024-12-13 | End: 2024-12-13

## 2024-12-13 RX ORDER — DOCUSATE SODIUM 100 MG
100 CAPSULE ORAL THREE TIMES A DAY
Refills: 0 | Status: DISCONTINUED | OUTPATIENT
Start: 2024-12-13 | End: 2024-12-16

## 2024-12-13 RX ORDER — POTASSIUM CHLORIDE 600 MG/1
40 TABLET, EXTENDED RELEASE ORAL ONCE
Refills: 0 | Status: COMPLETED | OUTPATIENT
Start: 2024-12-13 | End: 2024-12-13

## 2024-12-13 RX ADMIN — Medication 250 MILLIGRAM(S): at 05:21

## 2024-12-13 RX ADMIN — PANTOPRAZOLE SODIUM 40 MILLIGRAM(S): 40 TABLET, DELAYED RELEASE ORAL at 05:22

## 2024-12-13 RX ADMIN — Medication 81 MILLIGRAM(S): at 11:46

## 2024-12-13 RX ADMIN — ENOXAPARIN SODIUM 40 MILLIGRAM(S): 30 INJECTION SUBCUTANEOUS at 21:40

## 2024-12-13 RX ADMIN — SERTRALINE HYDROCHLORIDE 50 MILLIGRAM(S): 100 TABLET, FILM COATED ORAL at 11:46

## 2024-12-13 RX ADMIN — TAMSULOSIN HYDROCHLORIDE 0.4 MILLIGRAM(S): 0.4 CAPSULE ORAL at 21:39

## 2024-12-13 RX ADMIN — ACETAMINOPHEN, DIPHENHYDRAMINE HCL, PHENYLEPHRINE HCL 3 MILLIGRAM(S): 325; 25; 5 TABLET ORAL at 21:55

## 2024-12-13 RX ADMIN — Medication 100 MILLIGRAM(S): at 23:40

## 2024-12-13 RX ADMIN — OLANZAPINE 2.5 MILLIGRAM(S): 20 TABLET ORAL at 21:39

## 2024-12-13 RX ADMIN — LOSARTAN POTASSIUM 25 MILLIGRAM(S): 100 TABLET, FILM COATED ORAL at 05:21

## 2024-12-13 RX ADMIN — PIPERACILLIN SODIUM AND TAZOBACTAM SODIUM 25 GRAM(S): 4; .5 INJECTION, POWDER, LYOPHILIZED, FOR SOLUTION INTRAVENOUS at 14:28

## 2024-12-13 RX ADMIN — PIPERACILLIN SODIUM AND TAZOBACTAM SODIUM 25 GRAM(S): 4; .5 INJECTION, POWDER, LYOPHILIZED, FOR SOLUTION INTRAVENOUS at 23:40

## 2024-12-13 RX ADMIN — METOPROLOL TARTRATE 25 MILLIGRAM(S): 100 TABLET, FILM COATED ORAL at 05:22

## 2024-12-13 RX ADMIN — POTASSIUM CHLORIDE 40 MILLIEQUIVALENT(S): 600 TABLET, EXTENDED RELEASE ORAL at 17:53

## 2024-12-13 RX ADMIN — PIPERACILLIN SODIUM AND TAZOBACTAM SODIUM 25 GRAM(S): 4; .5 INJECTION, POWDER, LYOPHILIZED, FOR SOLUTION INTRAVENOUS at 05:22

## 2024-12-13 RX ADMIN — Medication 250 MILLIGRAM(S): at 17:52

## 2024-12-13 RX ADMIN — TICAGRELOR 90 MILLIGRAM(S): 90 TABLET ORAL at 17:52

## 2024-12-13 RX ADMIN — TICAGRELOR 90 MILLIGRAM(S): 90 TABLET ORAL at 05:22

## 2024-12-13 RX ADMIN — ROSUVASTATIN CALCIUM 40 MILLIGRAM(S): 5 TABLET, FILM COATED ORAL at 21:40

## 2024-12-13 NOTE — DISCHARGE NOTE PROVIDER - HOSPITAL COURSE
HPI:  65 yo male with PMHx of CAD s/p CABG s/p stent, HTN, HLD who presents to the ED with a cc of abdominal pain. Patient states that beginning November 5 of  he developed a mild left lower quadrant abdominal discomfort.  Patient went to his gastroenterologist who felt that the pain was due to constipation and was placed on Linzess.  As result of continued low-level discomfort without associated symptoms patient was started on Augmentin on the 20th of this month with no improvement of symptomatology. Patient was then seen in the ED on 11/22  and pt was diagnosed with diverticulitis at that time. He was treated with a course of Cipro/Flagyl. 4-5 days later the pain returned. He spoke with his GI and he was started back on Flagyl and Cefpodoxime he began this on Tuesday. He followed up with gastroenterology today for continued pain and pt was sent to the ED for further work up. Denies documented fevers but has had cold chills and sweats. Reports nausea with vomiting with loose stool. Weight loss of 8lbs since 11/5. Denies CP, SOB. Pt also notes congestion and cough but his daughters work at a  with similar symptoms.    ED course  Vitals: T 98.7, HR 98, /96, RR 16, SpO2 98% on RA  Significant labs: WBC 7.53, lactate 1.3, Cr 1.1, lipase 54  Imaging:   CT abdomen and pelvis with IV contrast shows Acute uncomplicated diverticulitis of the distal descending colon, new   compared to 11/22/2024. Minimal residual wall thickening and inflammation surrounding the proximal sigmoid colon, consistent with resolving sigmoid diverticulitis.  EKG: - EKG on admission NSR 95bpm; QTc 454 ms   In ED patient given: Zofran 4mg IVP x1, 2.3L NS bolus x1, Zosyn x1    (12 Dec 2024 20:08)      ---  HOSPITAL COURSE:   65 yo male with PMHx of CAD s/p CABG s/p stent, HTN, HLD, recent episode of diverticulitis, admitted for recurrent diverticulitis. CT shows Acute uncomplicated diverticulitis of the distal descending colon, resolving sigmoid diverticulitis. s/p outpatient course of augmentin, cipro/flagyl, and flagyl/cefpodoxime.  s/p Zofran 4mg IVP x1, 2.3L NS bolus x1, Zosyn x1 in the ED. He was continued on zosyn. He tolerated clear liquid diet. C diff ___,  GI PCR ___, RVP ___, COVID ___      Patient is stable for discharge as per primary medical team and consultants.    PT consulted, recommends discharge ______    Patient showed improvement throughout hospitalization. Patient was seen and examined on day of discharge. Patient was medically optimized for discharge with close outpatient follow up.        ---  CONSULTANTS:   FATMATA Son  ---  TIME SPENT:  I, the attending physician, was physically present for the key portions of the evaluation and management (E/M) service provided. The total amount of time spent reviewing the hospital notes, laboratory values, imaging findings, assessing/counseling the patient, discussing with consultant physicians, social work, nursing staff was 45 minutes     HPI:  65 yo male with PMHx of CAD s/p CABG s/p stent, HTN, HLD who presents to the ED with a cc of abdominal pain. Patient states that beginning November 5 of  he developed a mild left lower quadrant abdominal discomfort.  Patient went to his gastroenterologist who felt that the pain was due to constipation and was placed on Linzess.  As result of continued low-level discomfort without associated symptoms patient was started on Augmentin on the 20th of this month with no improvement of symptomatology. Patient was then seen in the ED on 11/22  and pt was diagnosed with diverticulitis at that time. He was treated with a course of Cipro/Flagyl. 4-5 days later the pain returned. He spoke with his GI and he was started back on Flagyl and Cefpodoxime he began this on Tuesday. He followed up with gastroenterology today for continued pain and pt was sent to the ED for further work up. Denies documented fevers but has had cold chills and sweats. Reports nausea with vomiting with loose stool. Weight loss of 8lbs since 11/5. Denies CP, SOB. Pt also notes congestion and cough but his daughters work at a  with similar symptoms.    ED course  Vitals: T 98.7, HR 98, /96, RR 16, SpO2 98% on RA  Significant labs: WBC 7.53, lactate 1.3, Cr 1.1, lipase 54  Imaging:   CT abdomen and pelvis with IV contrast shows Acute uncomplicated diverticulitis of the distal descending colon, new   compared to 11/22/2024. Minimal residual wall thickening and inflammation surrounding the proximal sigmoid colon, consistent with resolving sigmoid diverticulitis.  EKG: - EKG on admission NSR 95bpm; QTc 454 ms   In ED patient given: Zofran 4mg IVP x1, 2.3L NS bolus x1, Zosyn x1    (12 Dec 2024 20:08)      ---  HOSPITAL COURSE:   65 yo male with PMHx of CAD s/p CABG s/p stent, HTN, HLD, recent episode of diverticulitis, admitted for recurrent diverticulitis. CT showed acute uncomplicated diverticulitis of the distal descending colon, resolving sigmoid diverticulitis. s/p outpatient course of augmentin, cipro/flagyl, and flagyl/cefpodoxime.  s/p Zofran 4mg IVP x1, 2.3L NS bolus x1, Zosyn x1 in the ED. Surgery (Dr. Townsend), GI (Dr. Son) and ID (Dr. Nichols) were consulted. Pt treated conservatively with IV zosyn and liquid diet. He tolerated clear liquid diet, and diet was advanced as tolerated. C diff, GI PCR, RVP/COVID were negative.  Pt transitioned to PO antibiotics on discharge, and recommended to have surgical follow up as outpatient.       Patient is stable for discharge as per primary medical team and consultants.    Patient showed improvement throughout hospitalization. Patient was seen and examined on day of discharge. Patient was medically optimized for discharge with close outpatient follow up.    VITALS:   T(C): 36.9 (12-16-24 @ 04:28), Max: 37 (12-15-24 @ 20:56)  HR: 77 (12-16-24 @ 04:28) (77 - 84)  BP: 127/78 (12-16-24 @ 04:28) (127/78 - 154/94)  RR: 18 (12-16-24 @ 04:28) (18 - 18)  SpO2: 95% (12-15-24 @ 20:56) (93% - 95%)    GENERAL: NAD  HEENT:  anicteric, moist mucous membranes  CHEST/LUNG:  CTA b/l, no rales, wheezes, or rhonchi  HEART:  RRR, S1, S2  ABDOMEN:  BS+, soft, nontender, nondistended in all 4 quardants  EXTREMITIES: no edema, cyanosis, or calf tenderness  NERVOUS SYSTEM: answers questions and follows commands appropriately    ---  CONSULTANTS:   ID Dr Nichols  GI Dr Son  Surgery Dr. Townsend  ---  TIME SPENT:  I, the attending physician, was physically present for the key portions of the evaluation and management (E/M) service provided. The total amount of time spent reviewing the hospital notes, laboratory values, imaging findings, assessing/counseling the patient, discussing with consultant physicians, social work, nursing staff was 45 minutes     HOSPITAL COURSE:   65 yo male with PMHx of CAD s/p CABG s/p stent, HTN, HLD, recent episode of diverticulitis, admitted for recurrent diverticulitis. CT showed acute uncomplicated diverticulitis of the distal descending colon, resolving sigmoid diverticulitis. s/p outpatient course of augmentin, cipro/flagyl, and flagyl/cefpodoxime.  s/p Zofran 4mg IVP x1, 2.3L NS bolus x1, Zosyn x1 in the ED. Surgery (Dr. Townsend), GI (Dr. Son) and ID (Dr. Nichols) were consulted. Pt treated conservatively with IV zosyn and liquid diet. He tolerated clear liquid diet, and diet was advanced as tolerated. RVP/COVID were negative.  Pt transitioned to PO antibiotics on discharge (plan for vantin/flagyl for 5 more days), and recommended to have surgical follow up as outpatient.     Patient is stable for discharge as per primary medical team and consultants.    Patient showed improvement throughout hospitalization. Patient was seen and examined on day of discharge. Patient was medically optimized for discharge with close outpatient follow up.    VITALS:   T(C): 36.9 (12-16-24 @ 04:28), Max: 37 (12-15-24 @ 20:56)  HR: 77 (12-16-24 @ 04:28) (77 - 84)  BP: 127/78 (12-16-24 @ 04:28) (127/78 - 154/94)  RR: 18 (12-16-24 @ 04:28) (18 - 18)  SpO2: 95% (12-15-24 @ 20:56) (93% - 95%)    GENERAL: NAD  HEENT:  anicteric, moist mucous membranes  CHEST/LUNG:  CTA b/l, no rales, wheezes, or rhonchi  HEART:  RRR, S1, S2  ABDOMEN:  BS+, soft, nontender, nondistended in all 4 quardants  EXTREMITIES: no edema, cyanosis, or calf tenderness  NERVOUS SYSTEM: answers questions and follows commands appropriately    ---  CONSULTANTS:   FATMATA Nichols  GI Dr Son  Surgery Dr. Townsend    ---  TIME SPENT:  I, the attending physician, was physically present for the key portions of the evaluation and management (E/M) service provided. The total amount of time spent reviewing the hospital notes, laboratory values, imaging findings, assessing/counseling the patient, discussing with consultant physicians, social work, nursing staff was 45 minutes

## 2024-12-13 NOTE — CONSULT NOTE ADULT - ASSESSMENT
Diverticulitis  Abdominal pain    CT A/P w/ IVC (12/12): Acute uncomplicated diverticulitis of the distal descending colon, new compared to 11/22/2024. Minimal residual wall thickening and inflammation surrounding the proximal sigmoid colon, consistent with resolving sigmoid diverticulitis.    Recommendations:  - Clear liquid, advance to low fiber as tolerated  - IV abx   - Pain management  - Anti-emetics PRN   - Monitor GI function  - Surgery team following for recurrent diverticulitis, has an appt with Dr. Townsend in January   - To follow      I reviewed the overnight course of events on the unit, re-confirming the patient history. I discussed the care with the patient  Differential diagnosis and plan of care discussed with patient after the evaluation  35 minutes spent on total encounter of which more than fifty percent of the encounter was spent counseling and/or coordinating care by the attending physician.

## 2024-12-13 NOTE — PROGRESS NOTE ADULT - PROBLEM SELECTOR PLAN 1
- Acute uncomplicated diverticulitis of the distal descending colon, resolving sigmoid diverticulitis.  - s/p outpatient course of augmentin, cipro/flagyl, and flagyl/cefpodoxime   - s/p Zofran 4mg IVP x1, 2.3L NS bolus x1, Zosyn x1 in the ED   - continue zosyn  - probiotics  - bowel rest, tolerating clear liquid diet well  - IV hydration  - GI, Dr. Son, consulted; f/u recs  - Surgery consulted; recommends medical management  - ID consulted, Dr. Nichols, f/u recs - Acute uncomplicated diverticulitis of the distal descending colon, resolving sigmoid diverticulitis.  - s/p outpatient course of augmentin, cipro/flagyl, and flagyl/cefpodoxime   - s/p Zofran 4mg IVP x1, 2.3L NS bolus x1, Zosyn x1 in the ED   - continue zosyn  - probiotics  - bowel rest, tolerating clear liquid diet well  - IV hydration  - GI, Dr. Son, consulted; recommends Clear liquid diet, advance to low fiber as tolerated  - Surgery consulted; recommends medical management  - ID consulted, Dr. Nichols, f/u recs - Acute uncomplicated diverticulitis of the distal descending colon, resolving sigmoid diverticulitis.  - s/p outpatient course of augmentin, cipro/flagyl, and flagyl/cefpodoxime   - s/p Zofran 4mg IVP x1, 2.3L NS bolus x1, Zosyn x1 in the ED   - continue zosyn  - probiotics  - bowel rest, tolerating clear liquid diet well  - FU C diff, (isolation precautions), GI PCR   - FU RVP, COVID,  - IV hydration  - GI, Dr. Son, consulted; recommends Clear liquid diet, advance to low fiber as tolerated  - Surgery consulted; recommends medical management  - ID consulted, Dr. Nichols, f/u recs

## 2024-12-13 NOTE — CARE COORDINATION ASSESSMENT. - NSDCPLANSERVICES_GEN_ALL_CORE
This CM met at the bedside with the pt introduced myself as the CM explained my role and left contact information. The pt verbalized understanding. The pt lives in a private home with his wife and is independent with ADL's and ambulates independent without any devices. Pt has no services or HHA in the home. The pt is on IV Zosyn treating Diverticulitis and clear liquids. Pt will be for home with no skilled needs once medically cleared. The PCP is Dr. Sheryl Bro and the pharmacy is [facility]No Anticipated Discharge Needs

## 2024-12-13 NOTE — PROGRESS NOTE ADULT - SUBJECTIVE AND OBJECTIVE BOX
SURGERY PA NOTE ON BEHALF OF DR. Townsend/ SURGERY:    S: Patient seen and examined at bedside.   Pt denies any abd pain but endorses "soreness especially with movement or straining". Passing flatus, last BM yesterday am. He denies any N/V, fever or chills    MEDICATIONS:  acetaminophen     Tablet .. 650 milliGRAM(s) Oral every 6 hours PRN  aspirin  chewable 81 milliGRAM(s) Oral daily  dextrose 5%. 1000 milliLiter(s) IV Continuous <Continuous>  dextrose 5%. 1000 milliLiter(s) IV Continuous <Continuous>  dextrose 50% Injectable 25 Gram(s) IV Push once  dextrose 50% Injectable 12.5 Gram(s) IV Push once  dextrose 50% Injectable 25 Gram(s) IV Push once  dextrose Oral Gel 15 Gram(s) Oral once PRN  enoxaparin Injectable 40 milliGRAM(s) SubCutaneous every 24 hours  glucagon  Injectable 1 milliGRAM(s) IntraMuscular once  insulin lispro (ADMELOG) corrective regimen sliding scale   SubCutaneous three times a day before meals  insulin lispro (ADMELOG) corrective regimen sliding scale   SubCutaneous at bedtime  losartan 25 milliGRAM(s) Oral daily  melatonin 3 milliGRAM(s) Oral at bedtime PRN  metoprolol succinate ER 25 milliGRAM(s) Oral daily  niacin SR 1000 milliGRAM(s) Oral at bedtime  OLANZapine 2.5 milliGRAM(s) Oral at bedtime  pantoprazole    Tablet 40 milliGRAM(s) Oral before breakfast  piperacillin/tazobactam IVPB.. 3.375 Gram(s) IV Intermittent every 8 hours  rosuvastatin 40 milliGRAM(s) Oral at bedtime  saccharomyces boulardii 250 milliGRAM(s) Oral two times a day  sertraline 50 milliGRAM(s) Oral daily  sodium chloride 0.9%. 1000 milliLiter(s) IV Continuous <Continuous>  tamsulosin 0.4 milliGRAM(s) Oral at bedtime  ticagrelor 90 milliGRAM(s) Oral every 12 hours      O:  Vital Signs Last 24 Hrs  T(C): 36.8 (13 Dec 2024 05:15), Max: 37.1 (12 Dec 2024 16:39)  T(F): 98.2 (13 Dec 2024 05:15), Max: 98.8 (13 Dec 2024 00:19)  HR: 85 (13 Dec 2024 05:15) (85 - 98)  BP: 121/78 (13 Dec 2024 05:15) (120/79 - 147/96)  BP(mean): --  RR: 17 (13 Dec 2024 05:15) (16 - 17)  SpO2: 95% (13 Dec 2024 05:15) (93% - 99%)    Parameters below as of 13 Dec 2024 05:15  Patient On (Oxygen Delivery Method): room air        I&O SUMMARY:    12-12-24 @ 07:01  -  12-13-24 @ 07:00  --------------------------------------------------------  IN: 0 mL / OUT: 450 mL / NET: -450 mL        PHYSICAL EXAM:  Lungs: CTA bilat    Card: S1S2  Abd: Soft, ND, tender to deep palp LLQ.  +BS x 4.  No rebound/guarding.    Ext: Calves soft, NT, without edema bilat    LABS:                        12.1   5.95  )-----------( 161      ( 13 Dec 2024 06:00 )             35.2     12-13    140  |  110[H]  |  12  ----------------------------<  87  3.5   |  22  |  0.86    Ca    8.7      13 Dec 2024 06:00    TPro  6.0  /  Alb  3.1[L]  /  TBili  0.5  /  DBili  x   /  AST  28  /  ALT  41  /  AlkPhos  84  12-13    PT/INR - ( 12 Dec 2024 17:48 )   PT: 12.7 sec;   INR: 1.08 ratio    PTT - ( 12 Dec 2024 17:48 )  PTT:29.5 sec          RADIOLOGY:  < from: CT Abdomen and Pelvis w/ IV Cont (12.12.24 @ 18:34) >    ACC: [id number] EXAM:  CT ABDOMEN AND PELVIS IC   ORDERED BY: LEO SANTIAGO     PROCEDURE DATE:  12/12/2024          INTERPRETATION:  CLINICAL INFORMATION: Worsening abdominal pain. Recent   treatment for diverticulitis.    COMPARISON: CT abdomen/pelvis 11/22/2024    CONTRAST/COMPLICATIONS:  IV Contrast: Omnipaque 350  90 cc administered   10 cc discarded  Oral Contrast: NONE  .    PROCEDURE:  CT of the Abdomen and Pelvis was performed.  Sagittal and coronal reformats were performed.    FINDINGS:  LOWER CHEST: Within normal limits.    LIVER: Within normal limits.  BILE DUCTS: Normal caliber.  GALLBLADDER: Within normal limits.  SPLEEN: Within normal limits.  PANCREAS: Within normal limits.  ADRENALS: Within normal limits.  KIDNEYS/URETERS: No renal stones or hydronephrosis.    BLADDER: Within normal limits.  REPRODUCTIVE ORGANS: Prostate is enlarged.    BOWEL: No bowel obstruction. Colonic diverticulosis. New inflammatory   changes surrounding diverticula in the distal descending colon. Minimal   residual wall thickening and inflammatory changes in the proximal sigmoid   colon.  PERITONEUM/RETROPERITONEUM: Within normal limits.  VESSELS: Atherosclerotic changes.  LYMPH NODES: No lymphadenopathy.  ABDOMINAL WALL: Within normal limits.  BONES: Degenerative changes.    IMPRESSION:  Acute uncomplicated diverticulitis of the distal descending colon, new   compared to 11/22/2024.    Minimal residual wall thickening and inflammation surrounding the   proximal sigmoid colon, consistent with resolving sigmoid diverticulitis.      < end of copied text >

## 2024-12-13 NOTE — DISCHARGE NOTE PROVIDER - CARE PROVIDERS DIRECT ADDRESSES
,DirectAddress_Unknown ,DirectAddress_Unknown,DirectAddress_Unknown ,DirectAddress_Unknown,DirectAddress_Unknown,[email or web address]

## 2024-12-13 NOTE — PROGRESS NOTE ADULT - ASSESSMENT
63 yo male with PMHx of CAD s/p CABG s/p stent, HTN, HLD, recent episode of diverticulitis, admitted for diverticulitis.

## 2024-12-13 NOTE — CARE COORDINATION ASSESSMENT. - NSPASTMEDSURGHISTORY_GEN_ALL_CORE_FT
PAST MEDICAL & SURGICAL HISTORY:  Hypercholesterolemia      Hernia, Inguinal      Pneumonia due to Organism      CAD (Coronary Artery Disease) of Artery Bypass Graft  2005 - March 17th      CAD (Coronary Artery Disease), Nonautologous Biological Bypass Graft      S/P CABG      HLD (hyperlipidemia)      HTN (hypertension)      CAD (coronary artery disease)  CABG x3      Eye abnormality  left eye amblyopia repaired in 1994      Hernia  repaired      S/P CABG x 3  2005  3vCABG 2005 (LIMA to LAD, SVG to OM, left radial to Diag)   [facility]      Back pain      Arthritis      Prediabetes      Complex tear of medial meniscus, current injury, left knee, initial encounter      Status post coronary artery stent placement  Cardiac Stent Placed April 30th 2014 @ [facility]  Cardiac Stent X 2 placed January 15th 2017 (piggybacked) @ [facility]      Stented coronary artery  promus premier stent pSVG to OM 2014 and promus premier stent x2 SVG to OM 2017      CAD S/P percutaneous coronary angioplasty  baloon angioplasty Sep 2018

## 2024-12-13 NOTE — DISCHARGE NOTE PROVIDER - NSDCMRMEDTOKEN_GEN_ALL_CORE_FT
Aspirin Low Strength 81 mg oral tablet: 1 tab(s) orally once a day (at bedtime)  Brilinta (ticagrelor) 90 mg oral tablet: 1 tab(s) orally 2 times a day  irbesartan 75 mg oral tablet: 1 tab(s) orally once a day - IN AM  Jardiance 10 mg oral tablet: 1 tab(s) orally once a day (in the morning)  Metoprolol Succinate ER 25 mg oral tablet, extended release: 1 tab(s) orally once a day - IN AM  niacin 1000 mg oral tablet, extended release: 1 tab(s) orally once a day (at bedtime)  Protonix 40 mg oral delayed release tablet: 1 tab(s) orally  rosuvastatin 40 mg oral tablet: 1 tab(s) orally once a day (at bedtime)  sertraline 50 mg oral tablet: 1 tab(s) orally once a day  tamsulosin 0.4 mg oral capsule: 1 cap(s) orally once a day  Vascepa 1 g oral capsule: 1 cap(s) orally 2 times a day  Vitamin D3 50 mcg (2000 intl units) oral tablet: 1 tab(s) orally once a day  ZyPREXA 2.5 mg oral tablet: 1 tab(s) orally once a day (at bedtime)   Aspirin Low Strength 81 mg oral tablet: 1 tab(s) orally once a day (at bedtime)  Brilinta (ticagrelor) 90 mg oral tablet: 1 tab(s) orally 2 times a day  cefpodoxime 200 mg oral tablet: 1 tab(s) orally every 12 hours Take twice a day for 5 days, last day 12/20. You received the first dose in the hospital.  irbesartan 75 mg oral tablet: 1 tab(s) orally once a day - IN AM  Jardiance 10 mg oral tablet: 1 tab(s) orally once a day (in the morning)  Metoprolol Succinate ER 25 mg oral tablet, extended release: 1 tab(s) orally once a day - IN AM  metroNIDAZOLE 500 mg oral tablet: 1 tab(s) orally every 12 hours Please take twice a day for 5 days, last day 12/20. First dose given in hospital  niacin 1000 mg oral tablet, extended release: 1 tab(s) orally once a day (at bedtime)  Protonix 40 mg oral delayed release tablet: 1 tab(s) orally once a day  rosuvastatin 40 mg oral tablet: 1 tab(s) orally once a day (at bedtime)  sertraline 50 mg oral tablet: 1 tab(s) orally once a day  tamsulosin 0.4 mg oral capsule: 1 cap(s) orally once a day  Vascepa 1 g oral capsule: 1 cap(s) orally 2 times a day  Vitamin D3 50 mcg (2000 intl units) oral tablet: 1 tab(s) orally once a day  ZyPREXA 2.5 mg oral tablet: 1 tab(s) orally once a day (at bedtime)

## 2024-12-13 NOTE — CARE COORDINATION ASSESSMENT. - NSCAREPROVIDERS_GEN_ALL_CORE_FT
CARE PROVIDERS:  Accepting Physician: Jessica Howard  Administration: Miguel Angel Moreno  Administration: Marcos Moran  Admitting: Jessica Howard  Attending: Jessica Howard  Case Management: Eliane Elliott  Consultant: Jose Eduardo Townsend  Consultant: Terri Yang  Consultant: Tiarra Bird  Consultant: Abigail Ordonez  Consultant: Magaly Bates  Consultant: Evelyn Odell  Consultant: Jade Laird  Covering Team: Alton Cameron  Covering Team: Eugenio Cardenas  ED Attending: Cierra Cade  ED Nurse: Miguel Angel Dougherty  ED Nurse 2: Agustin Foy  Nurse: Zoraida Fernandez  Nurse: Adrianne Feldman  Ordered: Doctor, Unknown  Override: Adrianne Feldman  PCA/Nursing Assistant: Mya Escobar  Primary Team: Logan Gaviria  Primary Team: Mj Monteiro  Primary Team: Taryn Keene  Primary Team: Eliane Fisher  Primary Team: Tracee Nichols  Primary Team: Glenis Holland  Registered Dietitian: Ines Martin// Supp. Assoc.: Deanna Jj   CARE PROVIDERS:  Accepting Physician: Jessica Howard  Administration: Miguel Angel Moreno  Administration: Marcos Moran  Admitting: Jessica Howard  Attending: Jessica Howard  Case Management: Eliane Elliott  Consultant: Jose Eduardo Townsend  Consultant: Terri Yang  Consultant: Tiarra Bird  Consultant: Abigail Ordonez  Consultant: Magaly Bates  Consultant: Evelyn Odell  Consultant: Jade Laird  Covering Team: Alton Cameron  Covering Team: Eugenio Cardenas  ED Attending: Cierra Cade  ED Nurse: Miguel Angel Dougherty  ED Nurse 2: Agustin Foy  Nurse: Zoraida Fernandez  Nurse: Adrianne Feldman  Ordered: Doctor, Unknown  Override: Adrianne Feldman  Primary Team: Taryn Keene  Primary Team: Eliane Fisher  Primary Team: Glenis Holland  Primary Team: Tracee Nichols  Primary Team: Logan Gaviria  Primary Team: Mj Monteiro  Registered Dietitian: Ines Martin  Team: [facility]  Hospitalists, Team  UR// Supp. Assoc.: Deanna Jj

## 2024-12-13 NOTE — PROGRESS NOTE ADULT - ASSESSMENT
64yM with PMHx of CAD s/p CABG s/p stent, HTN, HLD who presents to the ED with of intermittent abdominal pain since November 5. VSSAF. Labs unremarkable. CT demonstrating acute uncomplicated diverticulitis of the distal descending colon, new compared to 11/22/2024. Minimal residual wall thickening and inflammation surrounding the proximal sigmoid colon, consistent with resolving sigmoid diverticulitis.  No leukocytosis or fever  Tolerating CLD  - Admitted to medicine  - CLD  - Continue abx  - Pain and nausea control  - GI follow up  - Case to be discussed with Dr. Townsend   64yM with PMHx of CAD s/p CABG s/p stent, HTN, HLD who presents to the ED with of intermittent abdominal pain since November 5. VSSAF. Labs unremarkable. CT demonstrating acute uncomplicated diverticulitis of the distal descending colon, new compared to 11/22/2024. Minimal residual wall thickening and inflammation surrounding the proximal sigmoid colon, consistent with resolving sigmoid diverticulitis.  No leukocytosis or fever  Tolerating CLD  - Admitted to medicine  - CLD  - Continue abx  - Pain and nausea control  - GI follow up  - Case to be discussed with Dr. Townsend    As in above full notation, unless countered below.  Mr. Arias personally seen/ examined during daily rounds.  Reports feeling better overall since admission.  Pain improved, though not yet resolved.  In isolation now for R/O C. Diff colitis.  Vitals remain non-suggestive.  Abdomen soft with only +/- tenderness limited to LLQ.  Labs reassuring with normal WBCs without left shift and no acidosis on chemistry.  CT with uncomplicated diverticulitis of distal descending colon.  Clinically, improving overall.  Surgically, stable for present.  No immediate indication for surgical intervention.  However, in agreement with admission for IV ABX given duration of complaints and failure to resolve despite multiple courses of oral ABX of last ~4+ weeks.  Will observe on liquid diet and IV ABX over next 48 to 72 hours and reassess for advancement and transition to PO ABX Monday.  Plan for interval colonoscopy and R/B/N of elective resection discussed preliminarily but in detail at his request.

## 2024-12-13 NOTE — CONSULT NOTE ADULT - ASSESSMENT
63 yo male with PMHx of CAD s/p CABG s/p stent, HTN, HLD, prior diverticulitis, who presented with a cc of abdominal pain. Symptoms started last month, and since then has been on multiple course of PO antibiotics.     CT showed acute uncomplicated diverticulitis of the distal descending colon. He has no fever and no leukocytosis. Patient says he has appointment with Surgery scheduled next month given hx of recurrent diverticulitis.    #Acute diverticulitis  #Diarrhea    -continue Zosyn  -suggest checking stool for GI PCR and c diff  -check COVID/flu/RSV  -blood cultures pending    Thank you for courtesy of this consult. I will be covered by Dr. Nichols this weekend, 12/14-12/15/24.    Discussed with the primary team.     Abigail Ordonez MD  Division of Infectious Diseases   Cell [phone] between 8am and 6pm   After 6pm and weekends please call ID service at [phone].     55 minutes spent on total encounter assessing patient, examination, chart review, counseling and coordinating care by the attending physician/nurse/care manager.

## 2024-12-13 NOTE — DISCHARGE NOTE PROVIDER - PROVIDER TOKENS
FREE:[LAST:[Dieudonne],FIRST:[Sheryl],PHONE:[(   )    -],FAX:[(   )    -],FOLLOWUP:[1 week],ESTABLISHEDPATIENT:[T]] FREE:[LAST:[Roselynova],FIRST:[Sheryl],PHONE:[(   )    -],FAX:[(   )    -],FOLLOWUP:[1 week],ESTABLISHEDPATIENT:[T]],PROVIDER:[TOKEN:[[id number]],FOLLOWUP:[2 weeks]] PROVIDER:[TOKEN:[[id number]],FOLLOWUP:[2 weeks]],FREE:[LAST:[Tapabova],FIRST:[Sheryl],PHONE:[(   )    -],FAX:[(   )    -],FOLLOWUP:[1 week],ESTABLISHEDPATIENT:[T]],PROVIDER:[TOKEN:[[id number]],FOLLOWUP:[2 weeks]]

## 2024-12-13 NOTE — DISCHARGE NOTE PROVIDER - CARE PROVIDER_API CALL
Sheryl Bro  Phone: (   )    -  Fax: (   )    -  Established Patient  Follow Up Time: 1 week   Sheryl Bro  Phone: (   )    -  Fax: (   )    -  Established Patient  Follow Up Time: 1 week    Santiago Son  Gastroenterology  [address] [place] [id number]  Phone: [phone]  Fax: [phone]  Follow Up Time: 2 weeks   Santiago Son  Gastroenterology  [address] [place] [address]  Phone: [phone]  Fax: [phone]  Follow Up Time: 2 weeks    Sheryl Bro  Phone: (   )    -  Fax: (   )    -  Established Patient  Follow Up Time: 1 week    Jose Eduardo Townsend  Surgery  [address] [place] [address]  Phone: [phone]  Fax: [phone]  Follow Up Time: 2 weeks

## 2024-12-13 NOTE — CONSULT NOTE ADULT - SUBJECTIVE AND OBJECTIVE BOX
[facility]  INFECTIOUS DISEASES - [address], [place] [address]  Tel: [phone]     Fax: [phone]  =======================================================    [id number]  ADITI ARAUJO     CC: Patient is a 64y old  Male who presents with a chief complaint of Diverticulitis (13 Dec 2024 11:53)    HPI:  63 yo male with PMHx of CAD s/p CABG s/p stent, HTN, HLD, prior diverticulitis, who presented with a cc of abdominal pain. Patient states that beginning  of  he developed a mild left lower quadrant abdominal discomfort.  Patient went to his gastroenterologist who felt that the pain was due to constipation and was placed on Linzess.  As result of continued low-level discomfort without associated symptoms patient was started on Augmentin on the  of this month with no improvement of symptomatology. Patient was then seen in the ED on   and pt was diagnosed with diverticulitis at that time. He was treated with a course of Cipro/Flagyl. 4-5 days later the pain returned. He spoke with his GI and he was started back on Flagyl and Cefpodoxime he began this on Tuesday. He followed up with gastroenterology today for continued pain and pt was sent to the ED for further work up. Denies documented fevers but has had cold chills and sweats over the past week. Reports nausea with vomiting, and on and off loose stool. Weight loss of 8lbs since . Denies CP, SOB. Pt also notes congestion and cough but his daughters work at a  with similar symptoms.        PAST MEDICAL & SURGICAL HISTORY:  S/P CABG      CAD (coronary artery disease)  CABG x3      HTN (hypertension)      HLD (hyperlipidemia)      Complex tear of medial meniscus, current injury, left knee, initial encounter      Prediabetes      Arthritis      Back pain      Stented coronary artery  promus premier stent pSVG to OM  and promus premier stent x2 SVG to OM       CAD (Coronary Artery Disease), Nonautologous Biological Bypass Graft      CAD (Coronary Artery Disease) of Artery Bypass Graft   -       Pneumonia due to Organism      Hernia, Inguinal      Hypercholesterolemia      S/P CABG x 3    3vCABG  (LIMA to LAD, SVG to OM, left radial to Diag)   [facility]      Hernia  repaired      Eye abnormality  left eye amblyopia repaired in       Status post coronary artery stent placement  Cardiac Stent Placed 2014 @ [facility]  Cardiac Stent X 2 placed 2017 (piggybacked) @ [facility]      CAD S/P percutaneous coronary angioplasty  baloon angioplasty Sep 2018          Social Hx:     FAMILY HISTORY:  Hypertension  Mother - Alive age 86    Family history of coronary artery disease in father  Father -  age 86    Family history of prostate cancer in father  Father -  age 86    FHx: stroke  Mother. 86Y        Allergies    No Known Allergies    Intolerances        Antibiotics:  MEDICATIONS  (STANDING):  aspirin  chewable 81 milliGRAM(s) Oral daily  dextrose 5%. 1000 milliLiter(s) (50 mL/Hr) IV Continuous <Continuous>  dextrose 5%. 1000 milliLiter(s) (100 mL/Hr) IV Continuous <Continuous>  dextrose 50% Injectable 25 Gram(s) IV Push once  dextrose 50% Injectable 12.5 Gram(s) IV Push once  dextrose 50% Injectable 25 Gram(s) IV Push once  enoxaparin Injectable 40 milliGRAM(s) SubCutaneous every 24 hours  glucagon  Injectable 1 milliGRAM(s) IntraMuscular once  insulin lispro (ADMELOG) corrective regimen sliding scale   SubCutaneous three times a day before meals  insulin lispro (ADMELOG) corrective regimen sliding scale   SubCutaneous at bedtime  losartan 25 milliGRAM(s) Oral daily  metoprolol succinate ER 25 milliGRAM(s) Oral daily  niacin SR 1000 milliGRAM(s) Oral at bedtime  OLANZapine 2.5 milliGRAM(s) Oral at bedtime  pantoprazole    Tablet 40 milliGRAM(s) Oral before breakfast  piperacillin/tazobactam IVPB.. 3.375 Gram(s) IV Intermittent every 8 hours  potassium chloride   Powder 40 milliEquivalent(s) Oral once  rosuvastatin 40 milliGRAM(s) Oral at bedtime  saccharomyces boulardii 250 milliGRAM(s) Oral two times a day  sertraline 50 milliGRAM(s) Oral daily  sodium chloride 0.9%. 1000 milliLiter(s) (75 mL/Hr) IV Continuous <Continuous>  tamsulosin 0.4 milliGRAM(s) Oral at bedtime  ticagrelor 90 milliGRAM(s) Oral every 12 hours    MEDICATIONS  (PRN):  acetaminophen     Tablet .. 650 milliGRAM(s) Oral every 6 hours PRN Temp greater or equal to 38C (100.4F), Mild Pain (1 - 3)  dextrose Oral Gel 15 Gram(s) Oral once PRN Blood Glucose LESS THAN 70 milliGRAM(s)/deciliter  melatonin 3 milliGRAM(s) Oral at bedtime PRN Insomnia       REVIEW OF SYSTEMS:  CONSTITUTIONAL:  No Fever or chills  HEENT:  + runny nose. no sore throat  CARDIOVASCULAR:  No chest pain or SOB.  RESPIRATORY: + cough, no shortness of breath  GASTROINTESTINAL:  see history  GENITOURINARY:  No dysuria, frequency or urgency  MUSCULOSKELETAL:  no joint aches, no muscle pain  NEUROLOGIC:  No headache or dizziness  PSYCHIATRIC:  No disorder of thought or mood.    Physical Exam:  Vital Signs Last 24 Hrs  T(C): 36.7 (13 Dec 2024 12:34), Max: 37.1 (12 Dec 2024 16:39)  T(F): 98.1 (13 Dec 2024 12:34), Max: 98.8 (13 Dec 2024 00:19)  HR: 81 (13 Dec 2024 12:34) (81 - 98)  BP: 136/86 (13 Dec 2024 12:34) (120/79 - 147/96)  BP(mean): --  RR: 18 (13 Dec 2024 12:34) (16 - 18)  SpO2: 95% (13 Dec 2024 12:34) (93% - 99%)    Parameters below as of 13 Dec 2024 12:34  Patient On (Oxygen Delivery Method): room air      Height (cm): 177.8 ( @ 16:39)  Weight (kg): 104.3 ( @ 16:39)  BMI (kg/m2): 33 ( @ 16:39)  BSA (m2): 2.21 ( @ 16:39)  GEN: NAD  HEENT: normocephalic and atraumatic.   NECK: Supple.   LUNGS: Normal respiratory effort  HEART: Regular rate and rhythm   ABDOMEN: Soft, nondistended. + LLQ tenderness on deep palpation  EXTREMITIES: No leg edema.  NEUROLOGIC: grossly intact.  PSYCHIATRIC: Appropriate affect .    Labs:      140  |  110[H]  |  12  ----------------------------<  87  3.5   |  22  |  0.86    Ca    8.7      13 Dec 2024 06:00    TPro  6.0  /  Alb  3.1[L]  /  TBili  0.5  /  DBili  x   /  AST  28  /  ALT  41  /  AlkPhos  84                            12.1   5.95  )-----------( 161      ( 13 Dec 2024 06:00 )             35.2     PT/INR - ( 12 Dec 2024 17:48 )   PT: 12.7 sec;   INR: 1.08 ratio         PTT - ( 12 Dec 2024 17:48 )  PTT:29.5 sec  Urinalysis Basic - ( 13 Dec 2024 06:00 )    Color: x / Appearance: x / SG: x / pH: x  Gluc: 87 mg/dL / Ketone: x  / Bili: x / Urobili: x   Blood: x / Protein: x / Nitrite: x   Leuk Esterase: x / RBC: x / WBC x   Sq Epi: x / Non Sq Epi: x / Bacteria: x      LIVER FUNCTIONS - ( 13 Dec 2024 06:00 )  Alb: 3.1 g/dL / Pro: 6.0 g/dL / ALK PHOS: 84 U/L / ALT: 41 U/L / AST: 28 U/L / GGT: x                           RECENT CULTURES:        All imaging and other data have been reviewed.    < from: CT Abdomen and Pelvis w/ IV Cont (24 @ 18:34) >  FINDINGS:  LOWER CHEST: Within normal limits.    LIVER: Within normal limits.  BILE DUCTS: Normal caliber.  GALLBLADDER: Within normal limits.  SPLEEN: Within normal limits.  PANCREAS: Within normal limits.  ADRENALS: Within normal limits.  KIDNEYS/URETERS: No renal stones or hydronephrosis.    BLADDER: Within normal limits.  REPRODUCTIVE ORGANS: Prostate is enlarged.    BOWEL: No bowel obstruction. Colonic diverticulosis. New inflammatory   changes surrounding diverticula in the distal descending colon. Minimal   residual wall thickening and inflammatory changes in the proximal sigmoid   colon.  PERITONEUM/RETROPERITONEUM: Within normal limits.  VESSELS: Atherosclerotic changes.  LYMPH NODES: No lymphadenopathy.  ABDOMINAL WALL: Within normal limits.  BONES: Degenerative changes.    IMPRESSION:  Acute uncomplicated diverticulitis of the distal descending colon, new   compared to 2024.    Minimal residual wall thickening and inflammation surrounding the   proximal sigmoid colon, consistent with resolving sigmoid diverticulitis.        --- End of Report ---    < end of copied text >

## 2024-12-13 NOTE — PROGRESS NOTE ADULT - SUBJECTIVE AND OBJECTIVE BOX
Patient is a 64y old  Male who presents with a chief complaint of Diverticulitis (13 Dec 2024 08:02)    INTERVAL HPI/OVERNIGHT EVENTS: No acute overnight events. Pt seen and examined at the bedside this AM. He states that his abd pain is still present in LUQ, but it is improved from yesterday. He mostly has pain when he bends or presses on the area. He states he tolerated the CLD well. Denies nausea, vomiting, CP, SOB, HA, dizziness, fever, chills, diarrhea, melena, constipation. He has had a nonproductive cough x 1 week.  He states he has an appointment scheduled in 1 month with Surgery Dr. Townsend for consult for surgery for repeated episodes of diverticulitis.    MEDICATIONS  (STANDING):  aspirin  chewable 81 milliGRAM(s) Oral daily  dextrose 5%. 1000 milliLiter(s) (50 mL/Hr) IV Continuous <Continuous>  dextrose 5%. 1000 milliLiter(s) (100 mL/Hr) IV Continuous <Continuous>  dextrose 50% Injectable 25 Gram(s) IV Push once  dextrose 50% Injectable 12.5 Gram(s) IV Push once  dextrose 50% Injectable 25 Gram(s) IV Push once  enoxaparin Injectable 40 milliGRAM(s) SubCutaneous every 24 hours  glucagon  Injectable 1 milliGRAM(s) IntraMuscular once  insulin lispro (ADMELOG) corrective regimen sliding scale   SubCutaneous three times a day before meals  insulin lispro (ADMELOG) corrective regimen sliding scale   SubCutaneous at bedtime  losartan 25 milliGRAM(s) Oral daily  metoprolol succinate ER 25 milliGRAM(s) Oral daily  niacin SR 1000 milliGRAM(s) Oral at bedtime  OLANZapine 2.5 milliGRAM(s) Oral at bedtime  pantoprazole    Tablet 40 milliGRAM(s) Oral before breakfast  piperacillin/tazobactam IVPB.. 3.375 Gram(s) IV Intermittent every 8 hours  rosuvastatin 40 milliGRAM(s) Oral at bedtime  saccharomyces boulardii 250 milliGRAM(s) Oral two times a day  sertraline 50 milliGRAM(s) Oral daily  sodium chloride 0.9%. 1000 milliLiter(s) (75 mL/Hr) IV Continuous <Continuous>  tamsulosin 0.4 milliGRAM(s) Oral at bedtime  ticagrelor 90 milliGRAM(s) Oral every 12 hours    MEDICATIONS  (PRN):  acetaminophen     Tablet .. 650 milliGRAM(s) Oral every 6 hours PRN Temp greater or equal to 38C (100.4F), Mild Pain (1 - 3)  dextrose Oral Gel 15 Gram(s) Oral once PRN Blood Glucose LESS THAN 70 milliGRAM(s)/deciliter  melatonin 3 milliGRAM(s) Oral at bedtime PRN Insomnia      Allergies    No Known Allergies    Intolerances        REVIEW OF SYSTEMS:  CONSTITUTIONAL: No fever or chills  HEENT:  No headache  RESPIRATORY: + cough,  denies wheezing, or shortness of breath  CARDIOVASCULAR: No chest pain, palpitations  GASTROINTESTINAL: +LUQ  abd pain, nausea, vomiting, or diarrhea  GENITOURINARY: No dysuria, frequency, or hematuria  NEUROLOGICAL: no focal weakness or dizziness  MUSCULOSKELETAL: no myalgias       PHYSICAL EXAM:  CONSTITUTIONAL: awake, alert, no acute distress  HEENT: Atraumatic normocephalic. Moist mucous membranes.  No conjunctival injection.  RESP: No respiratory distress; CTA b/l, no WRR  CV: RRR, +S1S2, no M/R/G  GI: Soft, + tender to deep palpation LLQ, nondistended, no rebound or guarding  MSK: Moving all four extremities spontaneously. No peripheral edema. No calf tenderness.  SKIN: Warm and dry. No rashes noted.  NEURO: AOx3, answering questions and following commands appropriately  PSYCH: Mood and affect appropriate, recent memory intact     Vital Signs Last 24 Hrs  T(C): 36.8 (13 Dec 2024 05:15), Max: 37.1 (12 Dec 2024 16:39)  T(F): 98.2 (13 Dec 2024 05:15), Max: 98.8 (13 Dec 2024 00:19)  HR: 85 (13 Dec 2024 05:15) (85 - 98)  BP: 121/78 (13 Dec 2024 05:15) (120/79 - 147/96)  BP(mean): --  RR: 17 (13 Dec 2024 05:15) (16 - 17)  SpO2: 95% (13 Dec 2024 05:15) (93% - 99%)    Parameters below as of 13 Dec 2024 05:15  Patient On (Oxygen Delivery Method): room air        LABS:                        12.1   5.95  )-----------( 161      ( 13 Dec 2024 06:00 )             35.2     CBC Full  -  ( 13 Dec 2024 06:00 )  WBC Count : 5.95 K/uL  Hemoglobin : 12.1 g/dL  Hematocrit : 35.2 %  Platelet Count - Automated : 161 K/uL  Mean Cell Volume : 88.7 fl  Mean Cell Hemoglobin : 30.5 pg  Mean Cell Hemoglobin Concentration : 34.4 g/dL  Auto Neutrophil # : 3.10 K/uL  Auto Lymphocyte # : 2.24 K/uL  Auto Monocyte # : 0.45 K/uL  Auto Eosinophil # : 0.12 K/uL  Auto Basophil # : 0.02 K/uL  Auto Neutrophil % : 52.2 %  Auto Lymphocyte % : 37.6 %  Auto Monocyte % : 7.6 %  Auto Eosinophil % : 2.0 %  Auto Basophil % : 0.3 %    13 Dec 2024 06:00    140    |  110    |  12     ----------------------------<  87     3.5     |  22     |  0.86     Ca    8.7        13 Dec 2024 06:00    TPro  6.0    /  Alb  3.1    /  TBili  0.5    /  DBili  x      /  AST  28     /  ALT  41     /  AlkPhos  84     13 Dec 2024 06:00    PT/INR - ( 12 Dec 2024 17:48 )   PT: 12.7 sec;   INR: 1.08 ratio         PTT - ( 12 Dec 2024 17:48 )  PTT:29.5 sec  Urinalysis Basic - ( 13 Dec 2024 06:00 )    Color: x / Appearance: x / SG: x / pH: x  Gluc: 87 mg/dL / Ketone: x  / Bili: x / Urobili: x   Blood: x / Protein: x / Nitrite: x   Leuk Esterase: x / RBC: x / WBC x   Sq Epi: x / Non Sq Epi: x / Bacteria: x      CAPILLARY BLOOD GLUCOSE      POCT Blood Glucose.: 85 mg/dL (13 Dec 2024 07:39)  POCT Blood Glucose.: 109 mg/dL (13 Dec 2024 00:07)  POCT Blood Glucose.: 121 mg/dL (12 Dec 2024 22:06)          RADIOLOGY & ADDITIONAL TESTS:  Personally reviewed.     Consultant(s) Notes Reviewed:  [x] YES  [ ] NO

## 2024-12-13 NOTE — DISCHARGE NOTE PROVIDER - NSDCFUSCHEDAPPT_GEN_ALL_CORE_FT
Jose Eduardo Townsend  [facility]  GENSURG [address]  Scheduled Appointment: 01/15/2025    Jameel Medinak  [facility]  CARDIOLOGY [phone]   Scheduled Appointment: 02/20/2025

## 2024-12-13 NOTE — PATIENT PROFILE ADULT - FALL HARM RISK - UNIVERSAL INTERVENTIONS
Bed in lowest position, wheels locked, appropriate side rails in place/Call bell, personal items and telephone in reach/Instruct patient to call for assistance before getting out of bed or chair/Non-slip footwear when patient is out of bed/Stopover to call system/Physically safe environment - no spills, clutter or unnecessary equipment/Purposeful Proactive Rounding/Room/bathroom lighting operational, light cord in reach

## 2024-12-13 NOTE — CONSULT NOTE ADULT - SUBJECTIVE AND OBJECTIVE BOX
[facility]    Bryce Farmer NP    [address] [place] [address]  [phone]      Chief Complaint:  Patient is a 64y old  Male who presents with a chief complaint of Diverticulitis (13 Dec 2024 10:04)      Patient is a 64y old  Male who presents with a chief complaint of Diverticulitis (13 Dec 2024 10:04)      HPI:  63 yo male with PMHx of CAD s/p CABG s/p stent, HTN, HLD who presents to the ED with a cc of abdominal pain. Patient states that beginning November 5 of  he developed a mild left lower quadrant abdominal discomfort.  Patient went to his gastroenterologist who felt that the pain was due to constipation and was placed on Linzess.  As result of continued low-level discomfort without associated symptoms patient was started on Augmentin on the 20th of this month with no improvement of symptomatology. Patient was then seen in the ED on 11/22  and pt was diagnosed with diverticulitis at that time. He was treated with a course of Cipro/Flagyl. 4-5 days later the pain returned. He spoke with his GI and he was started back on Flagyl and Cefpodoxime he began this on Tuesday. He followed up with gastroenterology today for continued pain and pt was sent to the ED for further work up. Denies documented fevers but has had cold chills and sweats. Reports nausea with vomiting with loose stool. Weight loss of 8lbs since 11/5. Denies CP, SOB. Pt also notes congestion and cough but his daughters work at a  with similar symptoms.      Interval HPI:  Patient seen and examined at bedside  Discussed and confirmed history above   He denies pain on my exam but reports abdominal pain prior to admission. Pain is located LLQ abdomen, non-radiating and described as sore/achey. Aggravated with palpation and alleviated with rest. Endorses some night sweats overnight. Denies fever, chills, nausea, vomiting or diarrhea. LBM was yesterday which was soft/brown. Denies recent travel. Daughters at home are sick with URI and he reports slight cough. Denies consumption of raw/undercooked foods.     CT abdomen and pelvis with IV contrast shows Acute uncomplicated diverticulitis of the distal descending colon, new compared to 11/22/2024. Minimal residual wall thickening and inflammation surrounding the proximal sigmoid colon, consistent with resolving sigmoid diverticulitis.      PAST MEDICAL & SURGICAL HISTORY:  S/P CABG      CAD (coronary artery disease)  CABG x3      HTN (hypertension)      HLD (hyperlipidemia)      Complex tear of medial meniscus, current injury, left knee, initial encounter      Prediabetes      Arthritis      Back pain      Stented coronary artery  promus premier stent pSVG to OM 2014 and promus premier stent x2 SVG to OM 2017      CAD (Coronary Artery Disease), Nonautologous Biological Bypass Graft      CAD (Coronary Artery Disease) of Artery Bypass Graft  2005 - March 17th      Pneumonia due to Organism      Hernia, Inguinal      Hypercholesterolemia      S/P CABG x 3  2005  3vCABG 2005 (LIMA to LAD, SVG to OM, left radial to Diag)   [facility]      Hernia  repaired      Eye abnormality  left eye amblyopia repaired in 1994      Status post coronary artery stent placement  Cardiac Stent Placed April 30th 2014 @ [facility]  Cardiac Stent X 2 placed January 15th 2017 (piggybacked) @ [facility]      CAD S/P percutaneous coronary angioplasty  baloon angioplasty Sep 2018          REVIEW OF SYSTEMS:   General: Negative  HEENT: Negative  CV: Negative  Respiratory: Negative  GI: See HPI  : Negative  MSK: Negative  Hematologic: Negative  Skin: Negative    MEDICATIONS:   MEDICATIONS  (STANDING):  aspirin  chewable 81 milliGRAM(s) Oral daily  dextrose 5%. 1000 milliLiter(s) (50 mL/Hr) IV Continuous <Continuous>  dextrose 5%. 1000 milliLiter(s) (100 mL/Hr) IV Continuous <Continuous>  dextrose 50% Injectable 25 Gram(s) IV Push once  dextrose 50% Injectable 12.5 Gram(s) IV Push once  dextrose 50% Injectable 25 Gram(s) IV Push once  enoxaparin Injectable 40 milliGRAM(s) SubCutaneous every 24 hours  glucagon  Injectable 1 milliGRAM(s) IntraMuscular once  insulin lispro (ADMELOG) corrective regimen sliding scale   SubCutaneous three times a day before meals  insulin lispro (ADMELOG) corrective regimen sliding scale   SubCutaneous at bedtime  losartan 25 milliGRAM(s) Oral daily  metoprolol succinate ER 25 milliGRAM(s) Oral daily  niacin SR 1000 milliGRAM(s) Oral at bedtime  OLANZapine 2.5 milliGRAM(s) Oral at bedtime  pantoprazole    Tablet 40 milliGRAM(s) Oral before breakfast  piperacillin/tazobactam IVPB.. 3.375 Gram(s) IV Intermittent every 8 hours  potassium chloride   Powder 40 milliEquivalent(s) Oral once  rosuvastatin 40 milliGRAM(s) Oral at bedtime  saccharomyces boulardii 250 milliGRAM(s) Oral two times a day  sertraline 50 milliGRAM(s) Oral daily  sodium chloride 0.9%. 1000 milliLiter(s) (75 mL/Hr) IV Continuous <Continuous>  tamsulosin 0.4 milliGRAM(s) Oral at bedtime  ticagrelor 90 milliGRAM(s) Oral every 12 hours    MEDICATIONS  (PRN):  acetaminophen     Tablet .. 650 milliGRAM(s) Oral every 6 hours PRN Temp greater or equal to 38C (100.4F), Mild Pain (1 - 3)  dextrose Oral Gel 15 Gram(s) Oral once PRN Blood Glucose LESS THAN 70 milliGRAM(s)/deciliter  melatonin 3 milliGRAM(s) Oral at bedtime PRN Insomnia          DIET:  Diet, Clear Liquid:   Fiber/Residue Restricted (12-12-24 @ 21:59) [Active]          ALLERGIES:   Allergies    No Known Allergies    Intolerances        VITAL SIGNS:   Vital Signs Last 24 Hrs  T(C): 36.8 (13 Dec 2024 05:15), Max: 37.1 (12 Dec 2024 16:39)  T(F): 98.2 (13 Dec 2024 05:15), Max: 98.8 (13 Dec 2024 00:19)  HR: 85 (13 Dec 2024 05:15) (85 - 98)  BP: 121/78 (13 Dec 2024 05:15) (120/79 - 147/96)  BP(mean): --  RR: 17 (13 Dec 2024 05:15) (16 - 17)  SpO2: 95% (13 Dec 2024 05:15) (93% - 99%)    Parameters below as of 13 Dec 2024 05:15  Patient On (Oxygen Delivery Method): room air      I&O's Summary    12 Dec 2024 07:01  -  13 Dec 2024 07:00  --------------------------------------------------------  IN: 0 mL / OUT: 450 mL / NET: -450 mL        PHYSICAL EXAM:   GENERAL:  No acute distress  HEENT:  NC/AT  CHEST:  No increased effort  HEART:  Regular rate  ABDOMEN:  Soft, +TTDP LLQ, non-distended, positive bowel sounds, no rebound or guarding  EXTREMITIES: No cyanosis  SKIN:  Warm, dry  NEURO:  Calm, cooperative    LABS:                        12.1   5.95  )-----------( 161      ( 13 Dec 2024 06:00 )             35.2     Hemoglobin: 12.1 g/dL (12-13-24 @ 06:00)  Hemoglobin: 14.4 g/dL (12-12-24 @ 17:48)    12-13    140  |  110[H]  |  12  ----------------------------<  87  3.5   |  22  |  0.86    Ca    8.7      13 Dec 2024 06:00    TPro  6.0  /  Alb  3.1[L]  /  TBili  0.5  /  DBili  x   /  AST  28  /  ALT  41  /  AlkPhos  84  12-13    LIVER FUNCTIONS - ( 13 Dec 2024 06:00 )  Alb: 3.1 g/dL / Pro: 6.0 g/dL / ALK PHOS: 84 U/L / ALT: 41 U/L / AST: 28 U/L / GGT: x             PT/INR - ( 12 Dec 2024 17:48 )   PT: 12.7 sec;   INR: 1.08 ratio         PTT - ( 12 Dec 2024 17:48 )  PTT:29.5 sec    Lipase: 54 U/L (12-12-24 @ 17:48)                                    RADIOLOGY & ADDITIONAL STUDIES:      ACC: [id number] EXAM:  CT ABDOMEN AND PELVIS IC   ORDERED BY: LEO SANTIAGO     PROCEDURE DATE:  12/12/2024          INTERPRETATION:  CLINICAL INFORMATION: Worsening abdominal pain. Recent   treatment for diverticulitis.    COMPARISON: CT abdomen/pelvis 11/22/2024    CONTRAST/COMPLICATIONS:  IV Contrast: Omnipaque 350  90 cc administered   10 cc discarded  Oral Contrast: NONE  .    PROCEDURE:  CT of the Abdomen and Pelvis was performed.  Sagittal and coronal reformats were performed.    FINDINGS:  LOWER CHEST: Within normal limits.    LIVER: Within normal limits.  BILE DUCTS: Normal caliber.  GALLBLADDER: Within normal limits.  SPLEEN: Within normal limits.  PANCREAS: Within normal limits.  ADRENALS: Within normal limits.  KIDNEYS/URETERS: No renal stones or hydronephrosis.    BLADDER: Within normal limits.  REPRODUCTIVE ORGANS: Prostate is enlarged.    BOWEL: No bowel obstruction. Colonic diverticulosis. New inflammatory   changes surrounding diverticula in the distal descending colon. Minimal   residual wall thickening and inflammatory changes in the proximal sigmoid   colon.  PERITONEUM/RETROPERITONEUM: Within normal limits.  VESSELS: Atherosclerotic changes.  LYMPH NODES: No lymphadenopathy.  ABDOMINAL WALL: Within normal limits.  BONES: Degenerative changes.    IMPRESSION:  Acute uncomplicated diverticulitis of the distal descending colon, new   compared to 11/22/2024.    Minimal residual wall thickening and inflammation surrounding the   proximal sigmoid colon, consistent with resolving sigmoid diverticulitis.        --- End of Report ---             ANDREW BECERRA MD; Attending Radiologist  This document has been electronically signed. Dec 12 2024  7:02PM  12-12-24 @ 18:34

## 2024-12-13 NOTE — PROGRESS NOTE ADULT - ATTENDING COMMENTS
Patient seen at bedside.   Reports feeling a little better.   tolerating clears  complaining of a little diarrhea.  on contact for cdiff rule out.     A/P:  Acute recurrent diverticulitis     - s/p multiple courses of antibiotics.     - ID, GI and surgery following    - diet as tolerated.     - cdiff pending    - Patient seen at bedside.   Reports feeling a little better.   tolerating clears  complaining of a little diarrhea.  on contact for cdiff rule out.     A/P:  Acute recurrent diverticulitis     - s/p multiple courses of antibiotics.     - ID, GI and surgery following    - diet as tolerated.     - cdiff pending    - cont IVF    - check RVP    HTN  CAD S/p CABG    - cont asa and brilinta    - cont toprol 25mg daily and losartan     T2DM    - hold jardiance    - LDISS    DVT proph: lovenox 40mg daily

## 2024-12-13 NOTE — DISCHARGE NOTE PROVIDER - NSDCCPCAREPLAN_GEN_ALL_CORE_FT
PRINCIPAL DISCHARGE DIAGNOSIS  Diagnosis: Acute diverticulitis  Assessment and Plan of Treatment: You were admitted to the hospital with diverticulitis. Diverticulitis is inflammation or infection of small pouches in your colon that form when you have a condition called diverticulosis. This condition can range from mild to severe potentially leading to perforation or obstructions of your colon. Symptoms include abdominal pain, fever/chills, nausea, vomiting, diarrhea, constipation, or blood in your stool. You were evaluated by GI, Surgery and Infectious Disease. You were treated conservatively with IV antibiotics and a liquid diet. You were transitioned to oral antibiotics on the day of your discharge  - Please START ****  - Please eat a LOW fiber diet to prevent diverticulitis flares  SEEK IMMEDIATE MEDICAL CARE IF YOU HAVE THE FOLLOWING SYMPTOMS: worsening abdominal pain, high fever, inability to hold down liquids or medication, black or bloody stools, inability to pass gas, lightheadedness/dizziness, or a change in mental status.  - Please follow with your surgical team (Dr. Townsend). You have an appointment scheduled for January.   - Please make an appointment with gastroenterology for GI for an interval colonoscopy       PRINCIPAL DISCHARGE DIAGNOSIS  Diagnosis: Acute diverticulitis  Assessment and Plan of Treatment: You were admitted to the hospital with diverticulitis. Diverticulitis is inflammation or infection of small pouches in your colon that form when you have a condition called diverticulosis. This condition can range from mild to severe potentially leading to perforation or obstructions of your colon. Symptoms include abdominal pain, fever/chills, nausea, vomiting, diarrhea, constipation, or blood in your stool. You were evaluated by GI, Surgery and Infectious Disease. You were treated conservatively with IV antibiotics and a liquid diet. You were transitioned to oral antibiotics on the day of your discharge  - Please START ****  - Please eat a LOW fiber diet for the next 3 weeks. After that, you can transition to a high fiber diet.  SEEK IMMEDIATE MEDICAL CARE IF YOU HAVE THE FOLLOWING SYMPTOMS: worsening abdominal pain, high fever, inability to hold down liquids or medication, black or bloody stools, inability to pass gas, lightheadedness/dizziness, or a change in mental status.  - Please follow with your surgical team (Dr. Townsend). You have an appointment scheduled for January.   - Please make an appointment with gastroenterology for GI for an interval colonoscopy       PRINCIPAL DISCHARGE DIAGNOSIS  Diagnosis: Acute diverticulitis  Assessment and Plan of Treatment: You were admitted to the hospital with diverticulitis. Diverticulitis is inflammation or infection of small pouches in your colon that form when you have a condition called diverticulosis. This condition can range from mild to severe potentially leading to perforation or obstructions of your colon. Symptoms include abdominal pain, fever/chills, nausea, vomiting, diarrhea, constipation, or blood in your stool. You were evaluated by GI, Surgery and Infectious Disease. You were treated conservatively with IV antibiotics and a liquid diet. You were transitioned to oral antibiotics on the day of your discharge  - Please START Vantin 200mg two times a day and flagyl 500mg two times a day for 5 days. You recieved the first dose prior to your discharge from the hospital.  - Please eat a LOW fiber diet for the next 3 weeks. After that, you can transition to a high fiber diet.  SEEK IMMEDIATE MEDICAL CARE IF YOU HAVE THE FOLLOWING SYMPTOMS: worsening abdominal pain, high fever, inability to hold down liquids or medication, black or bloody stools, inability to pass gas, lightheadedness/dizziness, or a change in mental status.  - Please follow with your surgical team (Dr. Townsend). You have an appointment scheduled for January.   - Please make an appointment with gastroenterology for GI for an interval colonoscopy       PRINCIPAL DISCHARGE DIAGNOSIS  Diagnosis: Acute diverticulitis  Assessment and Plan of Treatment: You were admitted to the hospital with diverticulitis. Diverticulitis is inflammation or infection of small pouches in your colon that form when you have a condition called diverticulosis. This condition can range from mild to severe potentially leading to perforation or obstructions of your colon. Symptoms include abdominal pain, fever/chills, nausea, vomiting, diarrhea, constipation, or blood in your stool. You were evaluated by GI, Surgery and Infectious Disease. You were treated conservatively with IV antibiotics and a liquid diet. You were transitioned to oral antibiotics on the day of your discharge  - Please START Vantin 200mg two times a day and flagyl 500mg two times a day for 5 days. You received the first dose prior to your discharge from the hospital.  - Please eat a LOW fiber diet for the next 3 weeks. After that, you can transition to a high fiber diet.  SEEK IMMEDIATE MEDICAL CARE IF YOU HAVE THE FOLLOWING SYMPTOMS: worsening abdominal pain, high fever, inability to hold down liquids or medication, black or bloody stools, inability to pass gas, lightheadedness/dizziness, or a change in mental status.  - Please follow with your surgical team (Dr. Townsend). You have an appointment scheduled for January.   - Please make an appointment with gastroenterology for GI for an interval colonoscopy

## 2024-12-14 LAB
ALBUMIN SERPL ELPH-MCNC: 3.2 G/DL — LOW (ref 3.3–5)
ALP SERPL-CCNC: 84 U/L — SIGNIFICANT CHANGE UP (ref 40–120)
ALT FLD-CCNC: 42 U/L — SIGNIFICANT CHANGE UP (ref 12–78)
ANION GAP SERPL CALC-SCNC: 6 MMOL/L — SIGNIFICANT CHANGE UP (ref 5–17)
APPEARANCE UR: CLEAR — SIGNIFICANT CHANGE UP
AST SERPL-CCNC: 27 U/L — SIGNIFICANT CHANGE UP (ref 15–37)
BASOPHILS # BLD AUTO: 0.01 K/UL — SIGNIFICANT CHANGE UP (ref 0–0.2)
BASOPHILS NFR BLD AUTO: 0.2 % — SIGNIFICANT CHANGE UP (ref 0–2)
BILIRUB SERPL-MCNC: 0.7 MG/DL — SIGNIFICANT CHANGE UP (ref 0.2–1.2)
BILIRUB UR-MCNC: NEGATIVE — SIGNIFICANT CHANGE UP
BUN SERPL-MCNC: 10 MG/DL — SIGNIFICANT CHANGE UP (ref 7–23)
CALCIUM SERPL-MCNC: 9 MG/DL — SIGNIFICANT CHANGE UP (ref 8.5–10.1)
CHLORIDE SERPL-SCNC: 111 MMOL/L — HIGH (ref 96–108)
CO2 SERPL-SCNC: 24 MMOL/L — SIGNIFICANT CHANGE UP (ref 22–31)
COLOR SPEC: YELLOW — SIGNIFICANT CHANGE UP
CREAT SERPL-MCNC: 0.91 MG/DL — SIGNIFICANT CHANGE UP (ref 0.5–1.3)
DIFF PNL FLD: NEGATIVE — SIGNIFICANT CHANGE UP
EGFR: 95 ML/MIN/1.73M2 — SIGNIFICANT CHANGE UP
EOSINOPHIL # BLD AUTO: 0.12 K/UL — SIGNIFICANT CHANGE UP (ref 0–0.5)
EOSINOPHIL NFR BLD AUTO: 1.9 % — SIGNIFICANT CHANGE UP (ref 0–6)
FLUAV AG NPH QL: SIGNIFICANT CHANGE UP
FLUBV AG NPH QL: SIGNIFICANT CHANGE UP
GLUCOSE SERPL-MCNC: 88 MG/DL — SIGNIFICANT CHANGE UP (ref 70–99)
GLUCOSE UR QL: >=1000 MG/DL
HCT VFR BLD CALC: 37 % — LOW (ref 39–50)
HGB BLD-MCNC: 12.6 G/DL — LOW (ref 13–17)
IMM GRANULOCYTES NFR BLD AUTO: 0.3 % — SIGNIFICANT CHANGE UP (ref 0–0.9)
KETONES UR-MCNC: 15 MG/DL
LEUKOCYTE ESTERASE UR-ACNC: NEGATIVE — SIGNIFICANT CHANGE UP
LYMPHOCYTES # BLD AUTO: 2.18 K/UL — SIGNIFICANT CHANGE UP (ref 1–3.3)
LYMPHOCYTES # BLD AUTO: 34 % — SIGNIFICANT CHANGE UP (ref 13–44)
MAGNESIUM SERPL-MCNC: 2.1 MG/DL — SIGNIFICANT CHANGE UP (ref 1.6–2.6)
MCHC RBC-ENTMCNC: 30.1 PG — SIGNIFICANT CHANGE UP (ref 27–34)
MCHC RBC-ENTMCNC: 34.1 G/DL — SIGNIFICANT CHANGE UP (ref 32–36)
MCV RBC AUTO: 88.3 FL — SIGNIFICANT CHANGE UP (ref 80–100)
MONOCYTES # BLD AUTO: 0.35 K/UL — SIGNIFICANT CHANGE UP (ref 0–0.9)
MONOCYTES NFR BLD AUTO: 5.5 % — SIGNIFICANT CHANGE UP (ref 2–14)
NEUTROPHILS # BLD AUTO: 3.74 K/UL — SIGNIFICANT CHANGE UP (ref 1.8–7.4)
NEUTROPHILS NFR BLD AUTO: 58.1 % — SIGNIFICANT CHANGE UP (ref 43–77)
NITRITE UR-MCNC: NEGATIVE — SIGNIFICANT CHANGE UP
NRBC # BLD: 0 /100 WBCS — SIGNIFICANT CHANGE UP (ref 0–0)
PH UR: 5 — SIGNIFICANT CHANGE UP (ref 5–8)
PHOSPHATE SERPL-MCNC: 2.8 MG/DL — SIGNIFICANT CHANGE UP (ref 2.5–4.5)
PLATELET # BLD AUTO: 167 K/UL — SIGNIFICANT CHANGE UP (ref 150–400)
POTASSIUM SERPL-MCNC: 3.8 MMOL/L — SIGNIFICANT CHANGE UP (ref 3.5–5.3)
POTASSIUM SERPL-SCNC: 3.8 MMOL/L — SIGNIFICANT CHANGE UP (ref 3.5–5.3)
PROT SERPL-MCNC: 6.2 G/DL — SIGNIFICANT CHANGE UP (ref 6–8.3)
PROT UR-MCNC: NEGATIVE MG/DL — SIGNIFICANT CHANGE UP
RBC # BLD: 4.19 M/UL — LOW (ref 4.2–5.8)
RBC # FLD: 13.7 % — SIGNIFICANT CHANGE UP (ref 10.3–14.5)
RSV RNA NPH QL NAA+NON-PROBE: SIGNIFICANT CHANGE UP
SARS-COV-2 RNA SPEC QL NAA+PROBE: SIGNIFICANT CHANGE UP
SODIUM SERPL-SCNC: 141 MMOL/L — SIGNIFICANT CHANGE UP (ref 135–145)
SP GR SPEC: 1.02 — SIGNIFICANT CHANGE UP (ref 1–1.03)
UROBILINOGEN FLD QL: 0.2 MG/DL — SIGNIFICANT CHANGE UP (ref 0.2–1)
WBC # BLD: 6.42 K/UL — SIGNIFICANT CHANGE UP (ref 3.8–10.5)
WBC # FLD AUTO: 6.42 K/UL — SIGNIFICANT CHANGE UP (ref 3.8–10.5)

## 2024-12-14 PROCEDURE — 99233 SBSQ HOSP IP/OBS HIGH 50: CPT

## 2024-12-14 RX ADMIN — ENOXAPARIN SODIUM 40 MILLIGRAM(S): 30 INJECTION SUBCUTANEOUS at 22:36

## 2024-12-14 RX ADMIN — ROSUVASTATIN CALCIUM 40 MILLIGRAM(S): 5 TABLET, FILM COATED ORAL at 22:35

## 2024-12-14 RX ADMIN — TICAGRELOR 90 MILLIGRAM(S): 90 TABLET ORAL at 18:02

## 2024-12-14 RX ADMIN — Medication 81 MILLIGRAM(S): at 11:31

## 2024-12-14 RX ADMIN — SODIUM CHLORIDE 75 MILLILITER(S): 9 INJECTION, SOLUTION INTRAMUSCULAR; INTRAVENOUS; SUBCUTANEOUS at 06:37

## 2024-12-14 RX ADMIN — Medication 250 MILLIGRAM(S): at 18:02

## 2024-12-14 RX ADMIN — PANTOPRAZOLE SODIUM 40 MILLIGRAM(S): 40 TABLET, DELAYED RELEASE ORAL at 07:52

## 2024-12-14 RX ADMIN — PIPERACILLIN SODIUM AND TAZOBACTAM SODIUM 25 GRAM(S): 4; .5 INJECTION, POWDER, LYOPHILIZED, FOR SOLUTION INTRAVENOUS at 13:50

## 2024-12-14 RX ADMIN — TAMSULOSIN HYDROCHLORIDE 0.4 MILLIGRAM(S): 0.4 CAPSULE ORAL at 22:35

## 2024-12-14 RX ADMIN — METOPROLOL TARTRATE 25 MILLIGRAM(S): 100 TABLET, FILM COATED ORAL at 05:48

## 2024-12-14 RX ADMIN — Medication 1000 MILLIGRAM(S): at 00:45

## 2024-12-14 RX ADMIN — Medication 100 MILLIGRAM(S): at 13:50

## 2024-12-14 RX ADMIN — ACETAMINOPHEN, DIPHENHYDRAMINE HCL, PHENYLEPHRINE HCL 3 MILLIGRAM(S): 325; 25; 5 TABLET ORAL at 22:36

## 2024-12-14 RX ADMIN — SODIUM CHLORIDE 75 MILLILITER(S): 9 INJECTION, SOLUTION INTRAMUSCULAR; INTRAVENOUS; SUBCUTANEOUS at 18:04

## 2024-12-14 RX ADMIN — OLANZAPINE 2.5 MILLIGRAM(S): 20 TABLET ORAL at 22:34

## 2024-12-14 RX ADMIN — SERTRALINE HYDROCHLORIDE 50 MILLIGRAM(S): 100 TABLET, FILM COATED ORAL at 11:31

## 2024-12-14 RX ADMIN — Medication 100 MILLIGRAM(S): at 06:52

## 2024-12-14 RX ADMIN — LOSARTAN POTASSIUM 25 MILLIGRAM(S): 100 TABLET, FILM COATED ORAL at 05:49

## 2024-12-14 RX ADMIN — Medication 250 MILLIGRAM(S): at 05:48

## 2024-12-14 RX ADMIN — Medication 100 MILLIGRAM(S): at 22:36

## 2024-12-14 RX ADMIN — Medication 1000 MILLIGRAM(S): at 22:34

## 2024-12-14 RX ADMIN — TICAGRELOR 90 MILLIGRAM(S): 90 TABLET ORAL at 05:48

## 2024-12-14 RX ADMIN — PIPERACILLIN SODIUM AND TAZOBACTAM SODIUM 25 GRAM(S): 4; .5 INJECTION, POWDER, LYOPHILIZED, FOR SOLUTION INTRAVENOUS at 06:38

## 2024-12-14 RX ADMIN — PIPERACILLIN SODIUM AND TAZOBACTAM SODIUM 25 GRAM(S): 4; .5 INJECTION, POWDER, LYOPHILIZED, FOR SOLUTION INTRAVENOUS at 22:36

## 2024-12-14 NOTE — PROGRESS NOTE ADULT - SUBJECTIVE AND OBJECTIVE BOX
[facility]  Negro Reinoso PA-C  [address] [place] [id number]  [phone]      INTERVAL HPI/OVERNIGHT EVENTS:  Pt s/e  Still c/o LLQ abdominal pain, although improving  Tolerating full liquids    MEDICATIONS  (STANDING):  aspirin  chewable 81 milliGRAM(s) Oral daily  dextrose 5%. 1000 milliLiter(s) (100 mL/Hr) IV Continuous <Continuous>  dextrose 5%. 1000 milliLiter(s) (50 mL/Hr) IV Continuous <Continuous>  dextrose 50% Injectable 25 Gram(s) IV Push once  dextrose 50% Injectable 12.5 Gram(s) IV Push once  dextrose 50% Injectable 25 Gram(s) IV Push once  docusate sodium 100 milliGRAM(s) Oral three times a day  enoxaparin Injectable 40 milliGRAM(s) SubCutaneous every 24 hours  glucagon  Injectable 1 milliGRAM(s) IntraMuscular once  insulin lispro (ADMELOG) corrective regimen sliding scale   SubCutaneous three times a day before meals  insulin lispro (ADMELOG) corrective regimen sliding scale   SubCutaneous at bedtime  losartan 25 milliGRAM(s) Oral daily  metoprolol succinate ER 25 milliGRAM(s) Oral daily  niacin SR 1000 milliGRAM(s) Oral at bedtime  OLANZapine 2.5 milliGRAM(s) Oral at bedtime  pantoprazole    Tablet 40 milliGRAM(s) Oral before breakfast  piperacillin/tazobactam IVPB.. 3.375 Gram(s) IV Intermittent every 8 hours  rosuvastatin 40 milliGRAM(s) Oral at bedtime  saccharomyces boulardii 250 milliGRAM(s) Oral two times a day  sertraline 50 milliGRAM(s) Oral daily  sodium chloride 0.9%. 1000 milliLiter(s) (75 mL/Hr) IV Continuous <Continuous>  tamsulosin 0.4 milliGRAM(s) Oral at bedtime  ticagrelor 90 milliGRAM(s) Oral every 12 hours    MEDICATIONS  (PRN):  acetaminophen     Tablet .. 650 milliGRAM(s) Oral every 6 hours PRN Temp greater or equal to 38C (100.4F), Mild Pain (1 - 3)  dextrose Oral Gel 15 Gram(s) Oral once PRN Blood Glucose LESS THAN 70 milliGRAM(s)/deciliter  melatonin 3 milliGRAM(s) Oral at bedtime PRN Insomnia      Allergies  No Known Allergies    PHYSICAL EXAM:   Vital Signs:  Vital Signs Last 24 Hrs  T(C): 36.9 (14 Dec 2024 04:50), Max: 36.9 (13 Dec 2024 21:21)  T(F): 98.4 (14 Dec 2024 04:50), Max: 98.4 (13 Dec 2024 21:21)  HR: 83 (14 Dec 2024 04:50) (81 - 85)  BP: 110/70 (14 Dec 2024 04:50) (110/70 - 136/86)  BP(mean): --  RR: 19 (14 Dec 2024 04:50) (18 - 19)  SpO2: 94% (14 Dec 2024 04:50) (94% - 96%)    Parameters below as of 14 Dec 2024 04:50  Patient On (Oxygen Delivery Method): room air      Daily     Daily Weight in k (14 Dec 2024 04:50)    GENERAL:  Appears stated age  HEENT:  NC/AT  CHEST:  Full & symmetric excursion  HEART:  Regular rhythm  ABDOMEN:  Soft, non-tender, non-distended  EXTEREMITIES:  no cyanosis  SKIN:  No rash  NEURO:  Alert      LABS:                        12.6   6.42  )-----------( 167      ( 14 Dec 2024 06:31 )             37.0     12-14    141  |  111[H]  |  10  ----------------------------<  88  3.8   |  24  |  0.91    Ca    9.0      14 Dec 2024 06:31  Phos  2.8     12-14  Mg     2.1     12-14    TPro  6.2  /  Alb  3.2[L]  /  TBili  0.7  /  DBili  x   /  AST  27  /  ALT  42  /  AlkPhos  84  12-14    PT/INR - ( 12 Dec 2024 17:48 )   PT: 12.7 sec;   INR: 1.08 ratio         PTT - ( 12 Dec 2024 17:48 )  PTT:29.5 sec  Urinalysis Basic - ( 14 Dec 2024 08:45 )    Color: Yellow / Appearance: Clear / S.020 / pH: x  Gluc: x / Ketone: 15 mg/dL  / Bili: Negative / Urobili: 0.2 mg/dL   Blood: x / Protein: Negative mg/dL / Nitrite: Negative   Leuk Esterase: Negative / RBC: x / WBC x   Sq Epi: x / Non Sq Epi: x / Bacteria: x

## 2024-12-14 NOTE — PROGRESS NOTE ADULT - ASSESSMENT
Central Prior Authorization Team   Phone: [phone]      PA Initiation    Medication: blood glucose (ACCU-CHEK MARSHALL PLUS) test strip  Insurance Company: [employer] - Phone [phone] Fax [phone]  Pharmacy Filling the Rx: [id number] - [place], [place] - [address]  Filling Pharmacy Phone: [phone]  Filling Pharmacy Fax:    Start Date: 2/1/2021         65 yo male with PMHx of CAD s/p CABG s/p stent, HTN, HLD, recent episode of diverticulitis, admitted for diverticulitis.

## 2024-12-14 NOTE — PROGRESS NOTE ADULT - SUBJECTIVE AND OBJECTIVE BOX
Patient is a 64y old  Male who presents with a chief complaint of Diverticulitis (14 Dec 2024 08:13)      INTERVAL HPI/OVERNIGHT EVENTS: No acute overnight events. Pt was seen and examined at bedside. Pt states that he has no pain in the abd, denies cp or sob.    MEDICATIONS  (STANDING):  aspirin  chewable 81 milliGRAM(s) Oral daily  dextrose 5%. 1000 milliLiter(s) (100 mL/Hr) IV Continuous <Continuous>  dextrose 5%. 1000 milliLiter(s) (50 mL/Hr) IV Continuous <Continuous>  dextrose 50% Injectable 25 Gram(s) IV Push once  dextrose 50% Injectable 12.5 Gram(s) IV Push once  dextrose 50% Injectable 25 Gram(s) IV Push once  docusate sodium 100 milliGRAM(s) Oral three times a day  enoxaparin Injectable 40 milliGRAM(s) SubCutaneous every 24 hours  glucagon  Injectable 1 milliGRAM(s) IntraMuscular once  insulin lispro (ADMELOG) corrective regimen sliding scale   SubCutaneous three times a day before meals  insulin lispro (ADMELOG) corrective regimen sliding scale   SubCutaneous at bedtime  losartan 25 milliGRAM(s) Oral daily  metoprolol succinate ER 25 milliGRAM(s) Oral daily  niacin SR 1000 milliGRAM(s) Oral at bedtime  OLANZapine 2.5 milliGRAM(s) Oral at bedtime  pantoprazole    Tablet 40 milliGRAM(s) Oral before breakfast  piperacillin/tazobactam IVPB.. 3.375 Gram(s) IV Intermittent every 8 hours  rosuvastatin 40 milliGRAM(s) Oral at bedtime  saccharomyces boulardii 250 milliGRAM(s) Oral two times a day  sertraline 50 milliGRAM(s) Oral daily  sodium chloride 0.9%. 1000 milliLiter(s) (75 mL/Hr) IV Continuous <Continuous>  tamsulosin 0.4 milliGRAM(s) Oral at bedtime  ticagrelor 90 milliGRAM(s) Oral every 12 hours    MEDICATIONS  (PRN):  acetaminophen     Tablet .. 650 milliGRAM(s) Oral every 6 hours PRN Temp greater or equal to 38C (100.4F), Mild Pain (1 - 3)  dextrose Oral Gel 15 Gram(s) Oral once PRN Blood Glucose LESS THAN 70 milliGRAM(s)/deciliter  melatonin 3 milliGRAM(s) Oral at bedtime PRN Insomnia      Allergies    No Known Allergies    Intolerances        REVIEW OF SYSTEMS:  CONSTITUTIONAL: No fever or chills  HEENT:  No headache, no sore throat  RESPIRATORY: No cough, wheezing, or shortness of breath  CARDIOVASCULAR: No chest pain, palpitations  GASTROINTESTINAL: No abd pain, nausea, vomiting, or diarrhea  GENITOURINARY: No dysuria, frequency, or hematuria  NEUROLOGICAL: no focal weakness or dizziness  MUSCULOSKELETAL: no myalgias     Vital Signs Last 24 Hrs  T(C): 36.9 (14 Dec 2024 04:50), Max: 36.9 (13 Dec 2024 21:21)  T(F): 98.4 (14 Dec 2024 04:50), Max: 98.4 (13 Dec 2024 21:21)  HR: 83 (14 Dec 2024 04:50) (81 - 85)  BP: 110/70 (14 Dec 2024 04:50) (110/70 - 136/86)  BP(mean): --  RR: 19 (14 Dec 2024 04:50) (18 - 19)  SpO2: 94% (14 Dec 2024 04:50) (94% - 96%)    Parameters below as of 14 Dec 2024 04:50  Patient On (Oxygen Delivery Method): room air        PHYSICAL EXAM:  GENERAL: NAD  HEENT:  anicteric, moist mucous membranes  CHEST/LUNG:  CTA b/l, no rales, wheezes, or rhonchi  HEART:  RRR, S1, S2  ABDOMEN:  BS+, soft, nontender, nondistended  EXTREMITIES: no edema, cyanosis, or calf tenderness  NERVOUS SYSTEM: answers questions and follows commands appropriately    LABS:                        12.6   6.42  )-----------( 167      ( 14 Dec 2024 06:31 )             37.0     CBC Full  -  ( 14 Dec 2024 06:31 )  WBC Count : 6.42 K/uL  Hemoglobin : 12.6 g/dL  Hematocrit : 37.0 %  Platelet Count - Automated : 167 K/uL  Mean Cell Volume : 88.3 fl  Mean Cell Hemoglobin : 30.1 pg  Mean Cell Hemoglobin Concentration : 34.1 g/dL  Auto Neutrophil # : 3.74 K/uL  Auto Lymphocyte # : 2.18 K/uL  Auto Monocyte # : 0.35 K/uL  Auto Eosinophil # : 0.12 K/uL  Auto Basophil # : 0.01 K/uL  Auto Neutrophil % : 58.1 %  Auto Lymphocyte % : 34.0 %  Auto Monocyte % : 5.5 %  Auto Eosinophil % : 1.9 %  Auto Basophil % : 0.2 %    14 Dec 2024 06:31    141    |  111    |  10     ----------------------------<  88     3.8     |  24     |  0.91     Ca    9.0        14 Dec 2024 06:31  Phos  2.8       14 Dec 2024 06:31  Mg     2.1       14 Dec 2024 06:31    TPro  6.2    /  Alb  3.2    /  TBili  0.7    /  DBili  x      /  AST  27     /  ALT  42     /  AlkPhos  84     14 Dec 2024 06:31    PT/INR - ( 12 Dec 2024 17:48 )   PT: 12.7 sec;   INR: 1.08 ratio         PTT - ( 12 Dec 2024 17:48 )  PTT:29.5 sec  Urinalysis Basic - ( 14 Dec 2024 08:45 )    Color: Yellow / Appearance: Clear / S.020 / pH: x  Gluc: x / Ketone: 15 mg/dL  / Bili: Negative / Urobili: 0.2 mg/dL   Blood: x / Protein: Negative mg/dL / Nitrite: Negative   Leuk Esterase: Negative / RBC: x / WBC x   Sq Epi: x / Non Sq Epi: x / Bacteria: x      CAPILLARY BLOOD GLUCOSE      POCT Blood Glucose.: 88 mg/dL (14 Dec 2024 07:46)  POCT Blood Glucose.: 90 mg/dL (13 Dec 2024 21:49)  POCT Blood Glucose.: 85 mg/dL (13 Dec 2024 16:49)  POCT Blood Glucose.: 91 mg/dL (13 Dec 2024 11:42)        Urinalysis with Rflx Culture (collected 24 @ 08:45)    Culture - Blood (collected 24 @ 18:05)  Source: .Blood BLOOD  Preliminary Report (24 @ 04:01):    No growth at 24 hours    Culture - Blood (collected 24 @ 18:00)  Source: .Blood BLOOD  Preliminary Report (24 @ 04:01):    No growth at 24 hours    Consultant(s) Notes Reviewed:  [x] YES  [ ] NO

## 2024-12-14 NOTE — PROGRESS NOTE ADULT - PROBLEM SELECTOR PLAN 1
- Acute uncomplicated diverticulitis of the distal descending colon, resolving sigmoid diverticulitis.  - s/p outpatient course of augmentin, cipro/flagyl, and flagyl/cefpodoxime   - s/p Zofran 4mg IVP x1, 2.3L NS bolus x1, Zosyn x1 in the ED   - continue zosyn  - probiotics  - bowel rest, tolerating clear liquid diet well  - FU C diff, (isolation precautions), GI PCR   - FU RVP, COVID,  - IV hydration  - GI, Dr. Son, consulted; recommends Clear liquid diet, advance to low fiber as tolerated  - Surgery consulted; recommends medical management  - ID consulted, Dr. Nichols, f/u recs - Acute uncomplicated diverticulitis of the distal descending colon, resolving sigmoid diverticulitis.  - s/p outpatient course of augmentin, cipro/flagyl, and flagyl/cefpodoxime   - s/p Zofran 4mg IVP x1, 2.3L NS bolus x1, Zosyn x1 in the ED   - continue zosyn til Monday   - probiotics  - bowel rest, tolerating clear liquid diet well  - FU C diff, (isolation precautions), GI PCR   - FU RVP, COVID,  - IV hydration  - GI, Dr. Son, consulted; recommends Clear liquid diet, advance to low fiber as tolerated  - Surgery consulted; recommends medical management, outpatient f/u with Dr. Townsend in Jan  - ID consulted, Dr. Nichols, f/u recs

## 2024-12-14 NOTE — PROGRESS NOTE ADULT - ATTENDING COMMENTS
Patient seen at bedside.   abdominal pain improved.   + small soft BM. no blood/watery.   isolation discontinued   afebrile     A/P:  Acute recurrent diverticulitis     - s/p multiple courses of antibiotics. on zosyn     - ID, GI and surgery following    - diet as tolerated. on full liquid diet      - cont IVF    HTN  CAD S/p CABG    - cont asa and brilinta    - cont toprol 25mg daily and losartan     T2DM    - hold jardiance    - LDISS    DVT proph: lovenox 40mg daily.   continue to monitor

## 2024-12-14 NOTE — PROGRESS NOTE ADULT - SUBJECTIVE AND OBJECTIVE BOX
a/w diverticulitis   KEVIN ANGEL    SUBJECTIVE:  Patient seen and examined at bedside this morning, denies any acute complaints, reports that he tolerated some FLD last night with minimal nausea, denies any vomiting. Reports that he had a bowel movement, formed. Denies any fever, chills, chest pain or shortness of breath. Minimal LLQ abdominal pain.     MEDICATIONS  (STANDING):  aspirin  chewable 81 milliGRAM(s) Oral daily  dextrose 5%. 1000 milliLiter(s) (100 mL/Hr) IV Continuous <Continuous>  dextrose 5%. 1000 milliLiter(s) (50 mL/Hr) IV Continuous <Continuous>  dextrose 50% Injectable 25 Gram(s) IV Push once  dextrose 50% Injectable 12.5 Gram(s) IV Push once  dextrose 50% Injectable 25 Gram(s) IV Push once  docusate sodium 100 milliGRAM(s) Oral three times a day  enoxaparin Injectable 40 milliGRAM(s) SubCutaneous every 24 hours  glucagon  Injectable 1 milliGRAM(s) IntraMuscular once  insulin lispro (ADMELOG) corrective regimen sliding scale   SubCutaneous three times a day before meals  insulin lispro (ADMELOG) corrective regimen sliding scale   SubCutaneous at bedtime  losartan 25 milliGRAM(s) Oral daily  metoprolol succinate ER 25 milliGRAM(s) Oral daily  niacin SR 1000 milliGRAM(s) Oral at bedtime  OLANZapine 2.5 milliGRAM(s) Oral at bedtime  pantoprazole    Tablet 40 milliGRAM(s) Oral before breakfast  piperacillin/tazobactam IVPB.. 3.375 Gram(s) IV Intermittent every 8 hours  rosuvastatin 40 milliGRAM(s) Oral at bedtime  saccharomyces boulardii 250 milliGRAM(s) Oral two times a day  sertraline 50 milliGRAM(s) Oral daily  sodium chloride 0.9%. 1000 milliLiter(s) (75 mL/Hr) IV Continuous <Continuous>  tamsulosin 0.4 milliGRAM(s) Oral at bedtime  ticagrelor 90 milliGRAM(s) Oral every 12 hours    MEDICATIONS  (PRN):  acetaminophen     Tablet .. 650 milliGRAM(s) Oral every 6 hours PRN Temp greater or equal to 38C (100.4F), Mild Pain (1 - 3)  dextrose Oral Gel 15 Gram(s) Oral once PRN Blood Glucose LESS THAN 70 milliGRAM(s)/deciliter  melatonin 3 milliGRAM(s) Oral at bedtime PRN Insomnia      Vital Signs Last 24 Hrs  T(C): 36.9 (14 Dec 2024 04:50), Max: 36.9 (13 Dec 2024 21:21)  T(F): 98.4 (14 Dec 2024 04:50), Max: 98.4 (13 Dec 2024 21:21)  HR: 83 (14 Dec 2024 04:50) (81 - 85)  BP: 110/70 (14 Dec 2024 04:50) (110/70 - 136/86)  BP(mean): --  RR: 19 (14 Dec 2024 04:50) (18 - 19)  SpO2: 94% (14 Dec 2024 04:50) (94% - 96%)    Parameters below as of 14 Dec 2024 04:50  Patient On (Oxygen Delivery Method): room air        PHYSICAL EXAM:  Constitutional: AAOx3, no acute distress  HEENT: NCAT, airway patent  Cardiovascular: RRR, pulses present bilaterally  Respiratory: nonlabored breathing  Gastrointestinal: abdomen soft, nontender, non distended, no rebound or guarding, no palpable masses  Neuro: no focal deficits  Extremities: non edematous, no calf pain bilaterally         I&O's Detail    13 Dec 2024 07:01  -  14 Dec 2024 07:00  --------------------------------------------------------  IN:    IV PiggyBack: 100 mL    Oral Fluid: 480 mL    sodium chloride 0.9%: 800 mL  Total IN: 1380 mL    OUT:    Voided (mL): 1250 mL  Total OUT: 1250 mL    Total NET: 130 mL          LABS:                        12.6   6.42  )-----------( 167      ( 14 Dec 2024 06:31 )             37.0     12-14    141  |  111[H]  |  10  ----------------------------<  88  3.8   |  24  |  0.91    Ca    9.0      14 Dec 2024 06:31  Phos  2.8     12-14  Mg     2.1     12-14    TPro  6.2  /  Alb  3.2[L]  /  TBili  0.7  /  DBili  x   /  AST  27  /  ALT  42  /  AlkPhos  84  12-14    PT/INR - ( 12 Dec 2024 17:48 )   PT: 12.7 sec;   INR: 1.08 ratio         PTT - ( 12 Dec 2024 17:48 )  PTT:29.5 sec  Urinalysis Basic - ( 14 Dec 2024 06:31 )    Color: x / Appearance: x / SG: x / pH: x  Gluc: 88 mg/dL / Ketone: x  / Bili: x / Urobili: x   Blood: x / Protein: x / Nitrite: x   Leuk Esterase: x / RBC: x / WBC x   Sq Epi: x / Non Sq Epi: x / Bacteria: x        RADIOLOGY & ADDITIONAL STUDIES:

## 2024-12-14 NOTE — PROGRESS NOTE ADULT - ASSESSMENT
64yM with PMHx of CAD s/p CABG s/p stent, HTN, HLD who presents to the ED with of intermittent abdominal pain since November 5. VSSAF. Labs unremarkable. CT demonstrating acute uncomplicated diverticulitis of the distal descending colon, new compared to 11/22/2024. Minimal residual wall thickening and inflammation surrounding the proximal sigmoid colon, consistent with resolving sigmoid diverticulitis.    Plan:  - to remain on FLD through the weekend  - continue abx  - pain and nausea control PRN  - plan for PO abx and discharge Monday   - Interval c-scope and sigmoidectomy  64yM with PMHx of CAD s/p CABG s/p stent, HTN, HLD who presents to the ED with of intermittent abdominal pain since November 5. VSSAF. Labs unremarkable. CT demonstrating acute uncomplicated diverticulitis of the distal descending colon, new compared to 11/22/2024. Minimal residual wall thickening and inflammation surrounding the proximal sigmoid colon, consistent with resolving sigmoid diverticulitis.    Plan:  - to remain on FLD through the weekend  - continue abx  - pain and nausea control PRN  - plan for PO abx and discharge Monday   - Interval c-scope and sigmoidectomy       As in above full PA notation, unless countered below.  Mr. Arias personally seen/ examined during daily rounding.  Reports feeling better overall since admission.  Pain at rest resolved, though with intermittent "cramping" following full liquids.  Vitals remain non-suggestive.  Abdomen soft with only +/- tenderness limited to deepest palpation of LLQ.  Labs reassuring with normal WBCs without left shift and no acidosis on chemistry.    Clinically, improving overall.  Surgically, stable for present.  No immediate indication for surgical intervention.  Will continue observation on liquid diet and IV ABX over next 48 hours and assess for advancement of diet/ transition to PO ABX Monday.

## 2024-12-15 LAB
ALBUMIN SERPL ELPH-MCNC: 3.2 G/DL — LOW (ref 3.3–5)
ALP SERPL-CCNC: 83 U/L — SIGNIFICANT CHANGE UP (ref 40–120)
ALT FLD-CCNC: 38 U/L — SIGNIFICANT CHANGE UP (ref 12–78)
ANION GAP SERPL CALC-SCNC: 8 MMOL/L — SIGNIFICANT CHANGE UP (ref 5–17)
AST SERPL-CCNC: 24 U/L — SIGNIFICANT CHANGE UP (ref 15–37)
BASOPHILS # BLD AUTO: 0.02 K/UL — SIGNIFICANT CHANGE UP (ref 0–0.2)
BASOPHILS NFR BLD AUTO: 0.3 % — SIGNIFICANT CHANGE UP (ref 0–2)
BILIRUB SERPL-MCNC: 0.7 MG/DL — SIGNIFICANT CHANGE UP (ref 0.2–1.2)
BUN SERPL-MCNC: 8 MG/DL — SIGNIFICANT CHANGE UP (ref 7–23)
CALCIUM SERPL-MCNC: 9.1 MG/DL — SIGNIFICANT CHANGE UP (ref 8.5–10.1)
CHLORIDE SERPL-SCNC: 113 MMOL/L — HIGH (ref 96–108)
CO2 SERPL-SCNC: 21 MMOL/L — LOW (ref 22–31)
CREAT SERPL-MCNC: 0.86 MG/DL — SIGNIFICANT CHANGE UP (ref 0.5–1.3)
EGFR: 98 ML/MIN/1.73M2 — SIGNIFICANT CHANGE UP
EOSINOPHIL # BLD AUTO: 0.09 K/UL — SIGNIFICANT CHANGE UP (ref 0–0.5)
EOSINOPHIL NFR BLD AUTO: 1.4 % — SIGNIFICANT CHANGE UP (ref 0–6)
GLUCOSE SERPL-MCNC: 81 MG/DL — SIGNIFICANT CHANGE UP (ref 70–99)
HCT VFR BLD CALC: 38.3 % — LOW (ref 39–50)
HGB BLD-MCNC: 13.2 G/DL — SIGNIFICANT CHANGE UP (ref 13–17)
IMM GRANULOCYTES NFR BLD AUTO: 0.3 % — SIGNIFICANT CHANGE UP (ref 0–0.9)
LYMPHOCYTES # BLD AUTO: 2.4 K/UL — SIGNIFICANT CHANGE UP (ref 1–3.3)
LYMPHOCYTES # BLD AUTO: 36.1 % — SIGNIFICANT CHANGE UP (ref 13–44)
MCHC RBC-ENTMCNC: 30.3 PG — SIGNIFICANT CHANGE UP (ref 27–34)
MCHC RBC-ENTMCNC: 34.5 G/DL — SIGNIFICANT CHANGE UP (ref 32–36)
MCV RBC AUTO: 87.8 FL — SIGNIFICANT CHANGE UP (ref 80–100)
MONOCYTES # BLD AUTO: 0.31 K/UL — SIGNIFICANT CHANGE UP (ref 0–0.9)
MONOCYTES NFR BLD AUTO: 4.7 % — SIGNIFICANT CHANGE UP (ref 2–14)
NEUTROPHILS # BLD AUTO: 3.8 K/UL — SIGNIFICANT CHANGE UP (ref 1.8–7.4)
NEUTROPHILS NFR BLD AUTO: 57.2 % — SIGNIFICANT CHANGE UP (ref 43–77)
NRBC # BLD: 0 /100 WBCS — SIGNIFICANT CHANGE UP (ref 0–0)
PLATELET # BLD AUTO: 181 K/UL — SIGNIFICANT CHANGE UP (ref 150–400)
POTASSIUM SERPL-MCNC: 3.6 MMOL/L — SIGNIFICANT CHANGE UP (ref 3.5–5.3)
POTASSIUM SERPL-SCNC: 3.6 MMOL/L — SIGNIFICANT CHANGE UP (ref 3.5–5.3)
PROT SERPL-MCNC: 6.5 G/DL — SIGNIFICANT CHANGE UP (ref 6–8.3)
RBC # BLD: 4.36 M/UL — SIGNIFICANT CHANGE UP (ref 4.2–5.8)
RBC # FLD: 13.5 % — SIGNIFICANT CHANGE UP (ref 10.3–14.5)
SODIUM SERPL-SCNC: 142 MMOL/L — SIGNIFICANT CHANGE UP (ref 135–145)
WBC # BLD: 6.64 K/UL — SIGNIFICANT CHANGE UP (ref 3.8–10.5)
WBC # FLD AUTO: 6.64 K/UL — SIGNIFICANT CHANGE UP (ref 3.8–10.5)

## 2024-12-15 PROCEDURE — 99233 SBSQ HOSP IP/OBS HIGH 50: CPT

## 2024-12-15 RX ORDER — POTASSIUM CHLORIDE 600 MG/1
40 TABLET, EXTENDED RELEASE ORAL ONCE
Refills: 0 | Status: COMPLETED | OUTPATIENT
Start: 2024-12-15 | End: 2024-12-15

## 2024-12-15 RX ORDER — 0.9 % SODIUM CHLORIDE 0.9 %
1000 INTRAVENOUS SOLUTION INTRAVENOUS
Refills: 0 | Status: DISCONTINUED | OUTPATIENT
Start: 2024-12-15 | End: 2024-12-16

## 2024-12-15 RX ADMIN — LOSARTAN POTASSIUM 25 MILLIGRAM(S): 100 TABLET, FILM COATED ORAL at 05:30

## 2024-12-15 RX ADMIN — ENOXAPARIN SODIUM 40 MILLIGRAM(S): 30 INJECTION SUBCUTANEOUS at 22:11

## 2024-12-15 RX ADMIN — TICAGRELOR 90 MILLIGRAM(S): 90 TABLET ORAL at 05:25

## 2024-12-15 RX ADMIN — Medication 100 MILLIGRAM(S): at 05:26

## 2024-12-15 RX ADMIN — OLANZAPINE 2.5 MILLIGRAM(S): 20 TABLET ORAL at 22:02

## 2024-12-15 RX ADMIN — TAMSULOSIN HYDROCHLORIDE 0.4 MILLIGRAM(S): 0.4 CAPSULE ORAL at 22:03

## 2024-12-15 RX ADMIN — Medication 250 MILLIGRAM(S): at 05:26

## 2024-12-15 RX ADMIN — POTASSIUM CHLORIDE 40 MILLIEQUIVALENT(S): 600 TABLET, EXTENDED RELEASE ORAL at 11:07

## 2024-12-15 RX ADMIN — PANTOPRAZOLE SODIUM 40 MILLIGRAM(S): 40 TABLET, DELAYED RELEASE ORAL at 07:01

## 2024-12-15 RX ADMIN — Medication 250 MILLIGRAM(S): at 17:06

## 2024-12-15 RX ADMIN — PIPERACILLIN SODIUM AND TAZOBACTAM SODIUM 25 GRAM(S): 4; .5 INJECTION, POWDER, LYOPHILIZED, FOR SOLUTION INTRAVENOUS at 05:25

## 2024-12-15 RX ADMIN — Medication 100 MILLIGRAM(S): at 13:36

## 2024-12-15 RX ADMIN — Medication 1000 MILLIGRAM(S): at 22:10

## 2024-12-15 RX ADMIN — Medication 75 MILLILITER(S): at 17:07

## 2024-12-15 RX ADMIN — Medication 100 MILLIGRAM(S): at 22:02

## 2024-12-15 RX ADMIN — METOPROLOL TARTRATE 25 MILLIGRAM(S): 100 TABLET, FILM COATED ORAL at 05:30

## 2024-12-15 RX ADMIN — ACETAMINOPHEN, DIPHENHYDRAMINE HCL, PHENYLEPHRINE HCL 3 MILLIGRAM(S): 325; 25; 5 TABLET ORAL at 22:02

## 2024-12-15 RX ADMIN — SERTRALINE HYDROCHLORIDE 50 MILLIGRAM(S): 100 TABLET, FILM COATED ORAL at 11:08

## 2024-12-15 RX ADMIN — TICAGRELOR 90 MILLIGRAM(S): 90 TABLET ORAL at 17:07

## 2024-12-15 RX ADMIN — PIPERACILLIN SODIUM AND TAZOBACTAM SODIUM 25 GRAM(S): 4; .5 INJECTION, POWDER, LYOPHILIZED, FOR SOLUTION INTRAVENOUS at 22:12

## 2024-12-15 RX ADMIN — Medication 81 MILLIGRAM(S): at 11:08

## 2024-12-15 RX ADMIN — PIPERACILLIN SODIUM AND TAZOBACTAM SODIUM 25 GRAM(S): 4; .5 INJECTION, POWDER, LYOPHILIZED, FOR SOLUTION INTRAVENOUS at 13:36

## 2024-12-15 RX ADMIN — ROSUVASTATIN CALCIUM 40 MILLIGRAM(S): 5 TABLET, FILM COATED ORAL at 22:03

## 2024-12-15 NOTE — PROGRESS NOTE ADULT - SUBJECTIVE AND OBJECTIVE BOX
Patient is a 64y old  Male who presents with a chief complaint of Diverticulitis (15 Dec 2024 11:28)      INTERVAL HPI/OVERNIGHT EVENTS: No acute overnight events. Pt was seen and examined at bedside. Pt states that his abdominal pain has significantly improved this morning.  Pt denies headache, dizziness, lightheadedness, fever, chills, body aches, CP, SOB, palpitations, abdominal pain, n/v, numbness/tingling.  No other complaints at this time.     MEDICATIONS  (STANDING):  aspirin  chewable 81 milliGRAM(s) Oral daily  dextrose 5% + sodium chloride 0.45% 1000 milliLiter(s) (75 mL/Hr) IV Continuous <Continuous>  dextrose 5%. 1000 milliLiter(s) (50 mL/Hr) IV Continuous <Continuous>  dextrose 5%. 1000 milliLiter(s) (100 mL/Hr) IV Continuous <Continuous>  dextrose 50% Injectable 25 Gram(s) IV Push once  dextrose 50% Injectable 12.5 Gram(s) IV Push once  dextrose 50% Injectable 25 Gram(s) IV Push once  docusate sodium 100 milliGRAM(s) Oral three times a day  enoxaparin Injectable 40 milliGRAM(s) SubCutaneous every 24 hours  glucagon  Injectable 1 milliGRAM(s) IntraMuscular once  insulin lispro (ADMELOG) corrective regimen sliding scale   SubCutaneous three times a day before meals  insulin lispro (ADMELOG) corrective regimen sliding scale   SubCutaneous at bedtime  losartan 25 milliGRAM(s) Oral daily  metoprolol succinate ER 25 milliGRAM(s) Oral daily  niacin SR 1000 milliGRAM(s) Oral at bedtime  OLANZapine 2.5 milliGRAM(s) Oral at bedtime  pantoprazole    Tablet 40 milliGRAM(s) Oral before breakfast  piperacillin/tazobactam IVPB.. 3.375 Gram(s) IV Intermittent every 8 hours  rosuvastatin 40 milliGRAM(s) Oral at bedtime  saccharomyces boulardii 250 milliGRAM(s) Oral two times a day  sertraline 50 milliGRAM(s) Oral daily  tamsulosin 0.4 milliGRAM(s) Oral at bedtime  ticagrelor 90 milliGRAM(s) Oral every 12 hours    MEDICATIONS  (PRN):  acetaminophen     Tablet .. 650 milliGRAM(s) Oral every 6 hours PRN Temp greater or equal to 38C (100.4F), Mild Pain (1 - 3)  dextrose Oral Gel 15 Gram(s) Oral once PRN Blood Glucose LESS THAN 70 milliGRAM(s)/deciliter  melatonin 3 milliGRAM(s) Oral at bedtime PRN Insomnia      Allergies    No Known Allergies    Intolerances        REVIEW OF SYSTEMS:  CONSTITUTIONAL: No fever or chills  HEENT:  No headache, no sore throat  RESPIRATORY: No cough, wheezing, or shortness of breath  CARDIOVASCULAR: No chest pain, palpitations  GASTROINTESTINAL: No abd pain, nausea, vomiting, or diarrhea  GENITOURINARY: No dysuria, frequency, or hematuria  NEUROLOGICAL: no focal weakness or dizziness  MUSCULOSKELETAL: no myalgias     Vital Signs Last 24 Hrs  T(C): 36.9 (15 Dec 2024 04:57), Max: 36.9 (14 Dec 2024 17:57)  T(F): 98.4 (15 Dec 2024 04:57), Max: 98.4 (14 Dec 2024 17:57)  HR: 105 (15 Dec 2024 05:29) (79 - 105)  BP: 142/91 (15 Dec 2024 05:29) (108/69 - 142/91)  BP(mean): --  RR: 18 (15 Dec 2024 04:57) (18 - 18)  SpO2: 98% (15 Dec 2024 04:57) (97% - 98%)    Parameters below as of 15 Dec 2024 04:57  Patient On (Oxygen Delivery Method): room air        PHYSICAL EXAM:  GENERAL: NAD  HEENT:  anicteric, moist mucous membranes  CHEST/LUNG:  CTA b/l, no rales, wheezes, or rhonchi  HEART:  RRR, S1, S2  ABDOMEN:  BS+, soft, nontender, nondistended in all 4 quardants  EXTREMITIES: no edema, cyanosis, or calf tenderness  NERVOUS SYSTEM: answers questions and follows commands appropriately    LABS:                        13.2   6.64  )-----------( 181      ( 15 Dec 2024 05:25 )             38.3     CBC Full  -  ( 15 Dec 2024 05:25 )  WBC Count : 6.64 K/uL  Hemoglobin : 13.2 g/dL  Hematocrit : 38.3 %  Platelet Count - Automated : 181 K/uL  Mean Cell Volume : 87.8 fl  Mean Cell Hemoglobin : 30.3 pg  Mean Cell Hemoglobin Concentration : 34.5 g/dL  Auto Neutrophil # : 3.80 K/uL  Auto Lymphocyte # : 2.40 K/uL  Auto Monocyte # : 0.31 K/uL  Auto Eosinophil # : 0.09 K/uL  Auto Basophil # : 0.02 K/uL  Auto Neutrophil % : 57.2 %  Auto Lymphocyte % : 36.1 %  Auto Monocyte % : 4.7 %  Auto Eosinophil % : 1.4 %  Auto Basophil % : 0.3 %    15 Dec 2024 05:25    142    |  113    |  8      ----------------------------<  81     3.6     |  21     |  0.86     Ca    9.1        15 Dec 2024 05:25    TPro  6.5    /  Alb  3.2    /  TBili  0.7    /  DBili  x      /  AST  24     /  ALT  38     /  AlkPhos  83     15 Dec 2024 05:25      Urinalysis Basic - ( 15 Dec 2024 05:25 )    Color: x / Appearance: x / SG: x / pH: x  Gluc: 81 mg/dL / Ketone: x  / Bili: x / Urobili: x   Blood: x / Protein: x / Nitrite: x   Leuk Esterase: x / RBC: x / WBC x   Sq Epi: x / Non Sq Epi: x / Bacteria: x      CAPILLARY BLOOD GLUCOSE      POCT Blood Glucose.: 125 mg/dL (15 Dec 2024 11:39)  POCT Blood Glucose.: 94 mg/dL (14 Dec 2024 23:14)  POCT Blood Glucose.: 91 mg/dL (14 Dec 2024 17:06)        Urinalysis with Rflx Culture (collected 12-14-24 @ 08:45)    Culture - Blood (collected 12-12-24 @ 18:05)  Source: .Blood BLOOD  Preliminary Report (12-15-24 @ 04:01):    No growth at 48 Hours    Culture - Blood (collected 12-12-24 @ 18:00)  Source: .Blood BLOOD  Preliminary Report (12-15-24 @ 04:01):    No growth at 48 Hours        RADIOLOGY & ADDITIONAL TESTS: ____    Personally reviewed.     Consultant(s) Notes Reviewed:  [x] YES  [ ] NO

## 2024-12-15 NOTE — PROGRESS NOTE ADULT - ATTENDING COMMENTS
Patient seen at bedside.   abdominal pain improved.   tolerating full liquid. no pain with eating.   afebrile   Ambulating     A/P:  Acute recurrent diverticulitis     - s/p multiple courses of antibiotics. on zosyn     - ID, GI and surgery following    - diet as tolerated. on full liquid diet    HTN  CAD S/p CABG    - cont asa and brilinta    - cont toprol 25mg daily and losartan     T2DM    - hold jardiance    - LDISS    DVT proph: lovenox 40mg daily.   continue to monitor .  Discussed with surgery. plan to advance to low fiber diet in AM. possible switch to po abx if ok with ID.

## 2024-12-15 NOTE — PROGRESS NOTE ADULT - PROBLEM SELECTOR PLAN 1
- Acute uncomplicated diverticulitis of the distal descending colon, resolving sigmoid diverticulitis.  - s/p outpatient course of augmentin, cipro/flagyl, and flagyl/cefpodoxime   - s/p Zofran 4mg IVP x1, 2.3L NS bolus x1, Zosyn x1 in the ED   - continue zosyn until Monday   - probiotics  - bowel rest, tolerating clear liquid diet well  - FU C diff, (isolation precautions), GI PCR   - RVP negative  - BCx NGTD  - IV hydration  - GI, Dr. Son, consulted; recommends Clear liquid diet, advance to low fiber as tolerated  - Surgery consulted; recommends medical management, outpatient f/u with Dr. Townsend in Jan  - ID consulted, Dr. Nichols, f/u recs

## 2024-12-15 NOTE — PROGRESS NOTE ADULT - ASSESSMENT
As in above full notation, unless countered below.  Mr. Arias personally seen/ examined during daily rounds.  Denies abdominal pain and reports toleration of full liquids.  Vitals non-suggestive.  Abdomen soft WITHOUT LLQ tenderness.  Labs reassuring.  Clinically, improving overall.  Surgically, stable for present.  To continue current supportive care with plan for low residue diet in am and dialogue with ID regarding PO ABX regimen.

## 2024-12-15 NOTE — PROGRESS NOTE ADULT - ASSESSMENT
Diverticulitis  Abdominal pain    CT A/P w/ IVC (12/12): Acute uncomplicated diverticulitis of the distal descending colon, new compared to 11/22/2024. Minimal residual wall thickening and inflammation surrounding the proximal sigmoid colon, consistent with resolving sigmoid diverticulitis.    Recommendations:  - Diet per surgery team, cleared for reg low fiber from GI standpoint  - IV abx, follow ID recs for PO abx upon d/c  - Pain management  - Anti-emetics PRN   - Monitor GI function  - Surgery team following for recurrent diverticulitis, has an appt with Dr. Townsend in January   - Will need outpatient GI follow up for interval colonoscopy  - To follow    I reviewed the overnight course of events on the unit, re-confirming the patient history. I discussed the care with the patient  Differential diagnosis and plan of care discussed with patient after the evaluation  35 minutes spent on total encounter of which more than fifty percent of the encounter was spent counseling and/or coordinating care by the attending physician.

## 2024-12-15 NOTE — PROGRESS NOTE ADULT - SUBJECTIVE AND OBJECTIVE BOX
SUBJECTIVE:  Patient seen and examined at bedside. No overnight events. Patient reports mild upper abdominal discomfort shortly after eating which quickly subsides. Admits to flatus. Last BM Friday (12/13). Voiding, ambulating.  Patient denies any fever, chills, chest pain, shortness of breath, nausea, vomiting, or urinary complaints.    VITALS  Vital Signs Last 24 Hrs  T(C): 36.9 (15 Dec 2024 04:57), Max: 36.9 (14 Dec 2024 17:57)  T(F): 98.4 (15 Dec 2024 04:57), Max: 98.4 (14 Dec 2024 17:57)  HR: 105 (15 Dec 2024 05:29) (79 - 110)  BP: 142/91 (15 Dec 2024 05:29) (108/69 - 142/91)  BP(mean): --  RR: 18 (15 Dec 2024 04:57) (18 - 19)  SpO2: 98% (15 Dec 2024 04:57) (97% - 99%)    Parameters below as of 15 Dec 2024 04:57  Patient On (Oxygen Delivery Method): room air    PHYSICAL EXAM  GENERAL:  Well-developed Male lying comfortably in bed in [facility].  HEENT:  NC/AT. Sclera white. Mucous membranes moist.  ABDOMEN:  Soft, nondistended, Mild TTP to deep palpation of LLQ;  No rebound tenderness or guarding.  SKIN:  No jaundice, pallor, or cyanosis  NEURO:  A&O x 3    INTAKE & OUTPUT  I&O's Summary    13 Dec 2024 07:01  -  14 Dec 2024 07:00  --------------------------------------------------------  IN: 1380 mL / OUT: 1250 mL / NET: 130 mL    14 Dec 2024 07:01  -  15 Dec 2024 06:32  --------------------------------------------------------  IN: 2275 mL / OUT: 2000 mL / NET: 275 mL      I&O's Detail    13 Dec 2024 07:01  -  14 Dec 2024 07:00  --------------------------------------------------------  IN:    IV PiggyBack: 100 mL    Oral Fluid: 480 mL    sodium chloride 0.9%: 800 mL  Total IN: 1380 mL    OUT:    Voided (mL): 1250 mL  Total OUT: 1250 mL    Total NET: 130 mL      14 Dec 2024 07:01  -  15 Dec 2024 06:32  --------------------------------------------------------  IN:    IV PiggyBack: 200 mL    IV PiggyBack: 50 mL    Oral Fluid: 1200 mL    sodium chloride 0.9%: 825 mL  Total IN: 2275 mL    OUT:    Voided (mL): 2000 mL  Total OUT: 2000 mL    Total NET: 275 mL    MEDICATIONS  MEDICATIONS  (STANDING):  aspirin  chewable 81 milliGRAM(s) Oral daily  dextrose 5%. 1000 milliLiter(s) (50 mL/Hr) IV Continuous <Continuous>  dextrose 5%. 1000 milliLiter(s) (100 mL/Hr) IV Continuous <Continuous>  dextrose 50% Injectable 25 Gram(s) IV Push once  dextrose 50% Injectable 12.5 Gram(s) IV Push once  dextrose 50% Injectable 25 Gram(s) IV Push once  docusate sodium 100 milliGRAM(s) Oral three times a day  enoxaparin Injectable 40 milliGRAM(s) SubCutaneous every 24 hours  glucagon  Injectable 1 milliGRAM(s) IntraMuscular once  insulin lispro (ADMELOG) corrective regimen sliding scale   SubCutaneous three times a day before meals  insulin lispro (ADMELOG) corrective regimen sliding scale   SubCutaneous at bedtime  losartan 25 milliGRAM(s) Oral daily  metoprolol succinate ER 25 milliGRAM(s) Oral daily  niacin SR 1000 milliGRAM(s) Oral at bedtime  OLANZapine 2.5 milliGRAM(s) Oral at bedtime  pantoprazole    Tablet 40 milliGRAM(s) Oral before breakfast  piperacillin/tazobactam IVPB.. 3.375 Gram(s) IV Intermittent every 8 hours  rosuvastatin 40 milliGRAM(s) Oral at bedtime  saccharomyces boulardii 250 milliGRAM(s) Oral two times a day  sertraline 50 milliGRAM(s) Oral daily  sodium chloride 0.9%. 1000 milliLiter(s) (75 mL/Hr) IV Continuous <Continuous>  tamsulosin 0.4 milliGRAM(s) Oral at bedtime  ticagrelor 90 milliGRAM(s) Oral every 12 hours    MEDICATIONS  (PRN):  acetaminophen     Tablet .. 650 milliGRAM(s) Oral every 6 hours PRN Temp greater or equal to 38C (100.4F), Mild Pain (1 - 3)  dextrose Oral Gel 15 Gram(s) Oral once PRN Blood Glucose LESS THAN 70 milliGRAM(s)/deciliter  melatonin 3 milliGRAM(s) Oral at bedtime PRN Insomnia    LABS:                        12.6   6.42  )-----------( 167      ( 14 Dec 2024 06:31 )             37.0     12-14    141  |  111[H]  |  10  ----------------------------<  88  3.8   |  24  |  0.91    Ca    9.0      14 Dec 2024 06:31  Phos  2.8     12-14  Mg     2.1     12-14    TPro  6.2  /  Alb  3.2[L]  /  TBili  0.7  /  DBili  x   /  AST  27  /  ALT  42  /  AlkPhos  84  12-14        Urinalysis with Rflx Culture (collected 14 Dec 2024 08:45)    Culture - Blood (collected 12 Dec 2024 18:05)  Source: .Blood BLOOD  Preliminary Report (15 Dec 2024 04:01):    No growth at 48 Hours    Culture - Blood (collected 12 Dec 2024 18:00)  Source: .Blood BLOOD  Preliminary Report (15 Dec 2024 04:01):    No growth at 48 Hours    ASSESSMENT & PLAN  63 y/o M with PMHx of CAD s/p CABG s/p stent, HTN, HLD who presents to the ED with of intermittent abdominal pain since November 5. VSSAF. Labs unremarkable. CT demonstrating acute uncomplicated diverticulitis of the distal descending colon, new compared to 11/22/2024. Minimal residual wall thickening and inflammation surrounding the proximal sigmoid colon, consistent with resolving sigmoid diverticulitis.  Pt denies abdominal complaints, is tolerating full liquid diet w/ transient post-prandial discomfort.  Afebrile.    Plan:  - to remain on FLD through the weekend  - continue abx  - pain and nausea control PRN  - plan for PO abx and discharge Monday   - Interval c-scope and sigmoidectomy

## 2024-12-15 NOTE — PROGRESS NOTE ADULT - SUBJECTIVE AND OBJECTIVE BOX
[facility]  Negro Reinoso PA-C  [address] [place] [id number]  [phone]      INTERVAL HPI/OVERNIGHT EVENTS:  Pt s/e  Abdominal pain starting to resolve  Pt tolerating full liquids    MEDICATIONS  (STANDING):  aspirin  chewable 81 milliGRAM(s) Oral daily  dextrose 5% + sodium chloride 0.45% 1000 milliLiter(s) (75 mL/Hr) IV Continuous <Continuous>  dextrose 5%. 1000 milliLiter(s) (50 mL/Hr) IV Continuous <Continuous>  dextrose 5%. 1000 milliLiter(s) (100 mL/Hr) IV Continuous <Continuous>  dextrose 50% Injectable 25 Gram(s) IV Push once  dextrose 50% Injectable 12.5 Gram(s) IV Push once  dextrose 50% Injectable 25 Gram(s) IV Push once  docusate sodium 100 milliGRAM(s) Oral three times a day  enoxaparin Injectable 40 milliGRAM(s) SubCutaneous every 24 hours  glucagon  Injectable 1 milliGRAM(s) IntraMuscular once  insulin lispro (ADMELOG) corrective regimen sliding scale   SubCutaneous three times a day before meals  insulin lispro (ADMELOG) corrective regimen sliding scale   SubCutaneous at bedtime  losartan 25 milliGRAM(s) Oral daily  metoprolol succinate ER 25 milliGRAM(s) Oral daily  niacin SR 1000 milliGRAM(s) Oral at bedtime  OLANZapine 2.5 milliGRAM(s) Oral at bedtime  pantoprazole    Tablet 40 milliGRAM(s) Oral before breakfast  piperacillin/tazobactam IVPB.. 3.375 Gram(s) IV Intermittent every 8 hours  rosuvastatin 40 milliGRAM(s) Oral at bedtime  saccharomyces boulardii 250 milliGRAM(s) Oral two times a day  sertraline 50 milliGRAM(s) Oral daily  tamsulosin 0.4 milliGRAM(s) Oral at bedtime  ticagrelor 90 milliGRAM(s) Oral every 12 hours    MEDICATIONS  (PRN):  acetaminophen     Tablet .. 650 milliGRAM(s) Oral every 6 hours PRN Temp greater or equal to 38C (100.4F), Mild Pain (1 - 3)  dextrose Oral Gel 15 Gram(s) Oral once PRN Blood Glucose LESS THAN 70 milliGRAM(s)/deciliter  melatonin 3 milliGRAM(s) Oral at bedtime PRN Insomnia      Allergies    No Known Allergies      PHYSICAL EXAM:   Vital Signs:  Vital Signs Last 24 Hrs  T(C): 36.9 (15 Dec 2024 04:57), Max: 36.9 (14 Dec 2024 17:57)  T(F): 98.4 (15 Dec 2024 04:57), Max: 98.4 (14 Dec 2024 17:57)  HR: 105 (15 Dec 2024 05:29) (79 - 110)  BP: 142/91 (15 Dec 2024 05:29) (108/69 - 142/91)  BP(mean): --  RR: 18 (15 Dec 2024 04:57) (18 - 19)  SpO2: 98% (15 Dec 2024 04:57) (97% - 99%)    Parameters below as of 15 Dec 2024 04:57  Patient On (Oxygen Delivery Method): room air      Daily     Daily Weight in k.9 (15 Dec 2024 04:57)    GENERAL:  Appears stated age  HEENT:  NC/AT  CHEST:  Full & symmetric excursion  HEART:  Regular rhythm  ABDOMEN:  Soft, non-tender, non-distended  EXTEREMITIES:  no cyanosis  SKIN:  No rash  NEURO:  Alert      LABS:                        13.2   6.64  )-----------( 181      ( 15 Dec 2024 05:25 )             38.3     12-15    142  |  113[H]  |  8   ----------------------------<  81  3.6   |  21[L]  |  0.86    Ca    9.1      15 Dec 2024 05:25  Phos  2.8     12-14  Mg     2.1     12-14    TPro  6.5  /  Alb  3.2[L]  /  TBili  0.7  /  DBili  x   /  AST  24  /  ALT  38  /  AlkPhos  83  12-15      Urinalysis Basic - ( 15 Dec 2024 05:25 )    Color: x / Appearance: x / SG: x / pH: x  Gluc: 81 mg/dL / Ketone: x  / Bili: x / Urobili: x   Blood: x / Protein: x / Nitrite: x   Leuk Esterase: x / RBC: x / WBC x   Sq Epi: x / Non Sq Epi: x / Bacteria: x

## 2024-12-16 ENCOUNTER — TRANSCRIPTION ENCOUNTER (OUTPATIENT)
Age: 64
End: 2024-12-16

## 2024-12-16 VITALS
TEMPERATURE: 98 F | HEART RATE: 93 BPM | SYSTOLIC BLOOD PRESSURE: 115 MMHG | DIASTOLIC BLOOD PRESSURE: 76 MMHG | RESPIRATION RATE: 18 BRPM | OXYGEN SATURATION: 94 %

## 2024-12-16 LAB
ALBUMIN SERPL ELPH-MCNC: 3.1 G/DL — LOW (ref 3.3–5)
ALP SERPL-CCNC: 81 U/L — SIGNIFICANT CHANGE UP (ref 40–120)
ALT FLD-CCNC: 35 U/L — SIGNIFICANT CHANGE UP (ref 12–78)
ANION GAP SERPL CALC-SCNC: 5 MMOL/L — SIGNIFICANT CHANGE UP (ref 5–17)
AST SERPL-CCNC: 21 U/L — SIGNIFICANT CHANGE UP (ref 15–37)
BASOPHILS # BLD AUTO: 0.03 K/UL — SIGNIFICANT CHANGE UP (ref 0–0.2)
BASOPHILS NFR BLD AUTO: 0.4 % — SIGNIFICANT CHANGE UP (ref 0–2)
BILIRUB SERPL-MCNC: 0.4 MG/DL — SIGNIFICANT CHANGE UP (ref 0.2–1.2)
BUN SERPL-MCNC: 6 MG/DL — LOW (ref 7–23)
CALCIUM SERPL-MCNC: 9.2 MG/DL — SIGNIFICANT CHANGE UP (ref 8.5–10.1)
CHLORIDE SERPL-SCNC: 111 MMOL/L — HIGH (ref 96–108)
CO2 SERPL-SCNC: 25 MMOL/L — SIGNIFICANT CHANGE UP (ref 22–31)
CREAT SERPL-MCNC: 1 MG/DL — SIGNIFICANT CHANGE UP (ref 0.5–1.3)
EGFR: 85 ML/MIN/1.73M2 — SIGNIFICANT CHANGE UP
EOSINOPHIL # BLD AUTO: 0.1 K/UL — SIGNIFICANT CHANGE UP (ref 0–0.5)
EOSINOPHIL NFR BLD AUTO: 1.3 % — SIGNIFICANT CHANGE UP (ref 0–6)
GLUCOSE SERPL-MCNC: 119 MG/DL — HIGH (ref 70–99)
HCT VFR BLD CALC: 36.5 % — LOW (ref 39–50)
HGB BLD-MCNC: 12.6 G/DL — LOW (ref 13–17)
IMM GRANULOCYTES NFR BLD AUTO: 0.3 % — SIGNIFICANT CHANGE UP (ref 0–0.9)
LYMPHOCYTES # BLD AUTO: 2.27 K/UL — SIGNIFICANT CHANGE UP (ref 1–3.3)
LYMPHOCYTES # BLD AUTO: 29.9 % — SIGNIFICANT CHANGE UP (ref 13–44)
MAGNESIUM SERPL-MCNC: 2 MG/DL — SIGNIFICANT CHANGE UP (ref 1.6–2.6)
MCHC RBC-ENTMCNC: 30.3 PG — SIGNIFICANT CHANGE UP (ref 27–34)
MCHC RBC-ENTMCNC: 34.5 G/DL — SIGNIFICANT CHANGE UP (ref 32–36)
MCV RBC AUTO: 87.7 FL — SIGNIFICANT CHANGE UP (ref 80–100)
MONOCYTES # BLD AUTO: 0.37 K/UL — SIGNIFICANT CHANGE UP (ref 0–0.9)
MONOCYTES NFR BLD AUTO: 4.9 % — SIGNIFICANT CHANGE UP (ref 2–14)
NEUTROPHILS # BLD AUTO: 4.79 K/UL — SIGNIFICANT CHANGE UP (ref 1.8–7.4)
NEUTROPHILS NFR BLD AUTO: 63.2 % — SIGNIFICANT CHANGE UP (ref 43–77)
NRBC # BLD: 0 /100 WBCS — SIGNIFICANT CHANGE UP (ref 0–0)
PHOSPHATE SERPL-MCNC: 2.6 MG/DL — SIGNIFICANT CHANGE UP (ref 2.5–4.5)
PLATELET # BLD AUTO: 182 K/UL — SIGNIFICANT CHANGE UP (ref 150–400)
POTASSIUM SERPL-MCNC: 3.7 MMOL/L — SIGNIFICANT CHANGE UP (ref 3.5–5.3)
POTASSIUM SERPL-SCNC: 3.7 MMOL/L — SIGNIFICANT CHANGE UP (ref 3.5–5.3)
PROT SERPL-MCNC: 6.4 G/DL — SIGNIFICANT CHANGE UP (ref 6–8.3)
RBC # BLD: 4.16 M/UL — LOW (ref 4.2–5.8)
RBC # FLD: 13.3 % — SIGNIFICANT CHANGE UP (ref 10.3–14.5)
SODIUM SERPL-SCNC: 141 MMOL/L — SIGNIFICANT CHANGE UP (ref 135–145)
WBC # BLD: 7.58 K/UL — SIGNIFICANT CHANGE UP (ref 3.8–10.5)
WBC # FLD AUTO: 7.58 K/UL — SIGNIFICANT CHANGE UP (ref 3.8–10.5)

## 2024-12-16 PROCEDURE — 36415 COLL VENOUS BLD VENIPUNCTURE: CPT

## 2024-12-16 PROCEDURE — 85025 COMPLETE CBC W/AUTO DIFF WBC: CPT

## 2024-12-16 PROCEDURE — 87637 SARSCOV2&INF A&B&RSV AMP PRB: CPT

## 2024-12-16 PROCEDURE — 83735 ASSAY OF MAGNESIUM: CPT

## 2024-12-16 PROCEDURE — 99232 SBSQ HOSP IP/OBS MODERATE 35: CPT

## 2024-12-16 PROCEDURE — 96365 THER/PROPH/DIAG IV INF INIT: CPT

## 2024-12-16 PROCEDURE — 87040 BLOOD CULTURE FOR BACTERIA: CPT

## 2024-12-16 PROCEDURE — 83036 HEMOGLOBIN GLYCOSYLATED A1C: CPT

## 2024-12-16 PROCEDURE — 99285 EMERGENCY DEPT VISIT HI MDM: CPT

## 2024-12-16 PROCEDURE — 96375 TX/PRO/DX INJ NEW DRUG ADDON: CPT

## 2024-12-16 PROCEDURE — 83605 ASSAY OF LACTIC ACID: CPT

## 2024-12-16 PROCEDURE — 83690 ASSAY OF LIPASE: CPT

## 2024-12-16 PROCEDURE — 96366 THER/PROPH/DIAG IV INF ADDON: CPT

## 2024-12-16 PROCEDURE — 93005 ELECTROCARDIOGRAM TRACING: CPT

## 2024-12-16 PROCEDURE — 84100 ASSAY OF PHOSPHORUS: CPT

## 2024-12-16 PROCEDURE — 74177 CT ABD & PELVIS W/CONTRAST: CPT | Mod: MC

## 2024-12-16 PROCEDURE — 85610 PROTHROMBIN TIME: CPT

## 2024-12-16 PROCEDURE — 82962 GLUCOSE BLOOD TEST: CPT

## 2024-12-16 PROCEDURE — 81003 URINALYSIS AUTO W/O SCOPE: CPT

## 2024-12-16 PROCEDURE — 99233 SBSQ HOSP IP/OBS HIGH 50: CPT

## 2024-12-16 PROCEDURE — 99239 HOSP IP/OBS DSCHRG MGMT >30: CPT

## 2024-12-16 PROCEDURE — 80053 COMPREHEN METABOLIC PANEL: CPT

## 2024-12-16 PROCEDURE — 0241U: CPT

## 2024-12-16 PROCEDURE — 85730 THROMBOPLASTIN TIME PARTIAL: CPT

## 2024-12-16 RX ORDER — METRONIDAZOLE 500 MG/1
500 TABLET ORAL EVERY 12 HOURS
Refills: 0 | Status: DISCONTINUED | OUTPATIENT
Start: 2024-12-16 | End: 2024-12-16

## 2024-12-16 RX ORDER — CEFPODOXIME PROXETIL 100 MG/5ML
1 GRANULE, FOR SUSPENSION ORAL
Qty: 9 | Refills: 0
Start: 2024-12-16 | End: 2024-12-20

## 2024-12-16 RX ORDER — METRONIDAZOLE 500 MG/1
1 TABLET ORAL
Qty: 9 | Refills: 0
Start: 2024-12-16 | End: 2024-12-20

## 2024-12-16 RX ORDER — PANTOPRAZOLE SODIUM 40 MG/1
1 TABLET, DELAYED RELEASE ORAL
Qty: 0 | Refills: 0 | DISCHARGE

## 2024-12-16 RX ORDER — CEFPODOXIME PROXETIL 100 MG/5ML
200 GRANULE, FOR SUSPENSION ORAL EVERY 12 HOURS
Refills: 0 | Status: DISCONTINUED | OUTPATIENT
Start: 2024-12-16 | End: 2024-12-16

## 2024-12-16 RX ADMIN — METRONIDAZOLE 500 MILLIGRAM(S): 500 TABLET ORAL at 13:56

## 2024-12-16 RX ADMIN — Medication 250 MILLIGRAM(S): at 05:27

## 2024-12-16 RX ADMIN — METOPROLOL TARTRATE 25 MILLIGRAM(S): 100 TABLET, FILM COATED ORAL at 05:30

## 2024-12-16 RX ADMIN — PANTOPRAZOLE SODIUM 40 MILLIGRAM(S): 40 TABLET, DELAYED RELEASE ORAL at 05:27

## 2024-12-16 RX ADMIN — Medication 100 MILLIGRAM(S): at 13:56

## 2024-12-16 RX ADMIN — SERTRALINE HYDROCHLORIDE 50 MILLIGRAM(S): 100 TABLET, FILM COATED ORAL at 11:18

## 2024-12-16 RX ADMIN — Medication 100 MILLIGRAM(S): at 07:00

## 2024-12-16 RX ADMIN — Medication 81 MILLIGRAM(S): at 11:18

## 2024-12-16 RX ADMIN — CEFPODOXIME PROXETIL 200 MILLIGRAM(S): 100 GRANULE, FOR SUSPENSION ORAL at 13:56

## 2024-12-16 RX ADMIN — TICAGRELOR 90 MILLIGRAM(S): 90 TABLET ORAL at 05:27

## 2024-12-16 RX ADMIN — PIPERACILLIN SODIUM AND TAZOBACTAM SODIUM 25 GRAM(S): 4; .5 INJECTION, POWDER, LYOPHILIZED, FOR SOLUTION INTRAVENOUS at 05:25

## 2024-12-16 RX ADMIN — LOSARTAN POTASSIUM 25 MILLIGRAM(S): 100 TABLET, FILM COATED ORAL at 05:30

## 2024-12-16 NOTE — PROGRESS NOTE ADULT - PROBLEM SELECTOR PROBLEM 4
DM (diabetes mellitus), type 2

## 2024-12-16 NOTE — DISCHARGE NOTE NURSING/CASE MANAGEMENT/SOCIAL WORK - FINANCIAL ASSISTANCE
[facility] provides services at a reduced cost to those who are determined to be eligible through [facility]’s financial assistance program. Information regarding [facility]’s financial assistance program can be found by going to [email or web address] or by calling [phone].

## 2024-12-16 NOTE — DISCHARGE NOTE NURSING/CASE MANAGEMENT/SOCIAL WORK - NSDCPEFALRISK_GEN_ALL_CORE
For information on Fall & Injury Prevention, visit: [email or web address] OR  [email or web address] OR  https://www.cdc.gov/steadi/patient.html

## 2024-12-16 NOTE — PROGRESS NOTE ADULT - SUBJECTIVE AND OBJECTIVE BOX
SUBJECTIVE:  Patient seen and examined at bedside.  No overnight events.  Patient reports feeling well, pain has subsided. Admits to flatus and had a large BM this AM. Tolerating regular low residue diet.  Patient denies any fever, chills, chest pain, shortness of breath, nausea or vomiting.    VITALS  Vital Signs Last 24 Hrs  T(C): 36.9 (16 Dec 2024 04:28), Max: 37 (15 Dec 2024 20:56)  T(F): 98.5 (16 Dec 2024 04:28), Max: 98.6 (15 Dec 2024 20:56)  HR: 77 (16 Dec 2024 04:28) (77 - 84)  BP: 127/78 (16 Dec 2024 04:28) (127/78 - 154/94)  RR: 18 (16 Dec 2024 04:28) (18 - 18)  SpO2: 95% (15 Dec 2024 20:56) (93% - 95%)    Parameters below as of 16 Dec 2024 04:28  Patient On (Oxygen Delivery Method): room air    PHYSICAL EXAM  GENERAL:  Well-nourished, well-developed male lying comfortably in bed in [facility].  HEENT:  NC/AT. Sclera white. Mucous membranes moist.  CARDIO:  Regular rate and rhythm.   RESPIRATORY:  Nonlabored breathing, no accessory muscle use.  ABDOMEN:  Soft, nondistended, nontender. No rebound tenderness or guarding.  SKIN:  No jaundice, pallor, or cyanosis  NEURO:  A&O x 3    MEDICATIONS  MEDICATIONS  (STANDING):  aspirin  chewable 81 milliGRAM(s) Oral daily  dextrose 5%. 1000 milliLiter(s) (50 mL/Hr) IV Continuous <Continuous>  dextrose 5%. 1000 milliLiter(s) (100 mL/Hr) IV Continuous <Continuous>  dextrose 50% Injectable 25 Gram(s) IV Push once  dextrose 50% Injectable 25 Gram(s) IV Push once  dextrose 50% Injectable 12.5 Gram(s) IV Push once  docusate sodium 100 milliGRAM(s) Oral three times a day  enoxaparin Injectable 40 milliGRAM(s) SubCutaneous every 24 hours  glucagon  Injectable 1 milliGRAM(s) IntraMuscular once  insulin lispro (ADMELOG) corrective regimen sliding scale   SubCutaneous three times a day before meals  insulin lispro (ADMELOG) corrective regimen sliding scale   SubCutaneous at bedtime  losartan 25 milliGRAM(s) Oral daily  metoprolol succinate ER 25 milliGRAM(s) Oral daily  niacin SR 1000 milliGRAM(s) Oral at bedtime  OLANZapine 2.5 milliGRAM(s) Oral at bedtime  pantoprazole    Tablet 40 milliGRAM(s) Oral before breakfast  piperacillin/tazobactam IVPB.. 3.375 Gram(s) IV Intermittent every 8 hours  rosuvastatin 40 milliGRAM(s) Oral at bedtime  saccharomyces boulardii 250 milliGRAM(s) Oral two times a day  sertraline 50 milliGRAM(s) Oral daily  tamsulosin 0.4 milliGRAM(s) Oral at bedtime  ticagrelor 90 milliGRAM(s) Oral every 12 hours    MEDICATIONS  (PRN):  acetaminophen     Tablet .. 650 milliGRAM(s) Oral every 6 hours PRN Temp greater or equal to 38C (100.4F), Mild Pain (1 - 3)  dextrose Oral Gel 15 Gram(s) Oral once PRN Blood Glucose LESS THAN 70 milliGRAM(s)/deciliter  melatonin 3 milliGRAM(s) Oral at bedtime PRN Insomnia    LABS:                        12.6   7.58  )-----------( 182      ( 16 Dec 2024 06:55 )             36.5     12-16    141  |  111[H]  |  6[L]  ----------------------------<  119[H]  3.7   |  25  |  1.00    Ca    9.2      16 Dec 2024 06:55  Phos  2.6     12-16  Mg     2.0     12-16    TPro  6.4  /  Alb  3.1[L]  /  TBili  0.4  /  DBili  x   /  AST  21  /  ALT  35  /  AlkPhos  81  12-16    Urinalysis with Rflx Culture (collected 14 Dec 2024 08:45)    RADIOLOGY  < from: CT Abdomen and Pelvis w/ IV Cont (12.12.24 @ 18:34) >  IMPRESSION:  Acute uncomplicated diverticulitis of the distal descending colon, new   compared to 11/22/2024.    Minimal residual wall thickening and inflammation surrounding the   proximal sigmoid colon, consistent with resolving sigmoid diverticulitis.    ASSESSMENT & PLAN  64 year old male with diverticulitis of the descending colon.    - Continue low residue diet  - Continue antibiotics; will discuss with ID regarding switching to PO  - D/c planning with outpatient follow up  - Case to be discussed with Dr. Townsend SUBJECTIVE:  Patient seen and examined at bedside.  No overnight events.  Patient reports feeling well, pain has subsided. Admits to flatus and had a large BM this AM. Tolerating regular low residue diet.  Patient denies any fever, chills, chest pain, shortness of breath, nausea or vomiting.    VITALS  Vital Signs Last 24 Hrs  T(C): 36.9 (16 Dec 2024 04:28), Max: 37 (15 Dec 2024 20:56)  T(F): 98.5 (16 Dec 2024 04:28), Max: 98.6 (15 Dec 2024 20:56)  HR: 77 (16 Dec 2024 04:28) (77 - 84)  BP: 127/78 (16 Dec 2024 04:28) (127/78 - 154/94)  RR: 18 (16 Dec 2024 04:28) (18 - 18)  SpO2: 95% (15 Dec 2024 20:56) (93% - 95%)    Parameters below as of 16 Dec 2024 04:28  Patient On (Oxygen Delivery Method): room air    PHYSICAL EXAM  GENERAL:  Well-nourished, well-developed male lying comfortably in bed in [facility].  HEENT:  NC/AT. Sclera white. Mucous membranes moist.  CARDIO:  Regular rate and rhythm.   RESPIRATORY:  Nonlabored breathing, no accessory muscle use.  ABDOMEN:  Soft, nondistended, nontender. No rebound tenderness or guarding.  SKIN:  No jaundice, pallor, or cyanosis  NEURO:  A&O x 3    MEDICATIONS  MEDICATIONS  (STANDING):  aspirin  chewable 81 milliGRAM(s) Oral daily  dextrose 5%. 1000 milliLiter(s) (50 mL/Hr) IV Continuous <Continuous>  dextrose 5%. 1000 milliLiter(s) (100 mL/Hr) IV Continuous <Continuous>  dextrose 50% Injectable 25 Gram(s) IV Push once  dextrose 50% Injectable 25 Gram(s) IV Push once  dextrose 50% Injectable 12.5 Gram(s) IV Push once  docusate sodium 100 milliGRAM(s) Oral three times a day  enoxaparin Injectable 40 milliGRAM(s) SubCutaneous every 24 hours  glucagon  Injectable 1 milliGRAM(s) IntraMuscular once  insulin lispro (ADMELOG) corrective regimen sliding scale   SubCutaneous three times a day before meals  insulin lispro (ADMELOG) corrective regimen sliding scale   SubCutaneous at bedtime  losartan 25 milliGRAM(s) Oral daily  metoprolol succinate ER 25 milliGRAM(s) Oral daily  niacin SR 1000 milliGRAM(s) Oral at bedtime  OLANZapine 2.5 milliGRAM(s) Oral at bedtime  pantoprazole    Tablet 40 milliGRAM(s) Oral before breakfast  piperacillin/tazobactam IVPB.. 3.375 Gram(s) IV Intermittent every 8 hours  rosuvastatin 40 milliGRAM(s) Oral at bedtime  saccharomyces boulardii 250 milliGRAM(s) Oral two times a day  sertraline 50 milliGRAM(s) Oral daily  tamsulosin 0.4 milliGRAM(s) Oral at bedtime  ticagrelor 90 milliGRAM(s) Oral every 12 hours    MEDICATIONS  (PRN):  acetaminophen     Tablet .. 650 milliGRAM(s) Oral every 6 hours PRN Temp greater or equal to 38C (100.4F), Mild Pain (1 - 3)  dextrose Oral Gel 15 Gram(s) Oral once PRN Blood Glucose LESS THAN 70 milliGRAM(s)/deciliter  melatonin 3 milliGRAM(s) Oral at bedtime PRN Insomnia    LABS:                        12.6   7.58  )-----------( 182      ( 16 Dec 2024 06:55 )             36.5     12-16    141  |  111[H]  |  6[L]  ----------------------------<  119[H]  3.7   |  25  |  1.00    Ca    9.2      16 Dec 2024 06:55  Phos  2.6     12-16  Mg     2.0     12-16    TPro  6.4  /  Alb  3.1[L]  /  TBili  0.4  /  DBili  x   /  AST  21  /  ALT  35  /  AlkPhos  81  12-16    Urinalysis with Rflx Culture (collected 14 Dec 2024 08:45)    RADIOLOGY  < from: CT Abdomen and Pelvis w/ IV Cont (12.12.24 @ 18:34) >  IMPRESSION:  Acute uncomplicated diverticulitis of the distal descending colon, new   compared to 11/22/2024.    Minimal residual wall thickening and inflammation surrounding the   proximal sigmoid colon, consistent with resolving sigmoid diverticulitis.    ASSESSMENT & PLAN  64 year old male with diverticulitis of the descending colon.    - Continue low residue diet  - Continue antibiotics; await ID recommendations regarding PO abx on d/c  - D/c planning with outpatient follow up  - Case to be discussed with Dr. Townsend SUBJECTIVE:  Patient seen and examined at bedside.  No overnight events.  Patient reports feeling well, pain has subsided. Admits to flatus and had a large BM this AM. Tolerating regular low residue diet.  Patient denies any fever, chills, chest pain, shortness of breath, nausea or vomiting.    VITALS  Vital Signs Last 24 Hrs  T(C): 36.9 (16 Dec 2024 04:28), Max: 37 (15 Dec 2024 20:56)  T(F): 98.5 (16 Dec 2024 04:28), Max: 98.6 (15 Dec 2024 20:56)  HR: 77 (16 Dec 2024 04:28) (77 - 84)  BP: 127/78 (16 Dec 2024 04:28) (127/78 - 154/94)  RR: 18 (16 Dec 2024 04:28) (18 - 18)  SpO2: 95% (15 Dec 2024 20:56) (93% - 95%)    Parameters below as of 16 Dec 2024 04:28  Patient On (Oxygen Delivery Method): room air    PHYSICAL EXAM  GENERAL:  Well-nourished, well-developed male lying comfortably in bed in [facility].  HEENT:  NC/AT. Sclera white. Mucous membranes moist.  CARDIO:  Regular rate and rhythm.   RESPIRATORY:  Nonlabored breathing, no accessory muscle use.  ABDOMEN:  Soft, nondistended, nontender. No rebound tenderness or guarding.  SKIN:  No jaundice, pallor, or cyanosis  NEURO:  A&O x 3    MEDICATIONS  MEDICATIONS  (STANDING):  aspirin  chewable 81 milliGRAM(s) Oral daily  dextrose 5%. 1000 milliLiter(s) (50 mL/Hr) IV Continuous <Continuous>  dextrose 5%. 1000 milliLiter(s) (100 mL/Hr) IV Continuous <Continuous>  dextrose 50% Injectable 25 Gram(s) IV Push once  dextrose 50% Injectable 25 Gram(s) IV Push once  dextrose 50% Injectable 12.5 Gram(s) IV Push once  docusate sodium 100 milliGRAM(s) Oral three times a day  enoxaparin Injectable 40 milliGRAM(s) SubCutaneous every 24 hours  glucagon  Injectable 1 milliGRAM(s) IntraMuscular once  insulin lispro (ADMELOG) corrective regimen sliding scale   SubCutaneous three times a day before meals  insulin lispro (ADMELOG) corrective regimen sliding scale   SubCutaneous at bedtime  losartan 25 milliGRAM(s) Oral daily  metoprolol succinate ER 25 milliGRAM(s) Oral daily  niacin SR 1000 milliGRAM(s) Oral at bedtime  OLANZapine 2.5 milliGRAM(s) Oral at bedtime  pantoprazole    Tablet 40 milliGRAM(s) Oral before breakfast  piperacillin/tazobactam IVPB.. 3.375 Gram(s) IV Intermittent every 8 hours  rosuvastatin 40 milliGRAM(s) Oral at bedtime  saccharomyces boulardii 250 milliGRAM(s) Oral two times a day  sertraline 50 milliGRAM(s) Oral daily  tamsulosin 0.4 milliGRAM(s) Oral at bedtime  ticagrelor 90 milliGRAM(s) Oral every 12 hours    MEDICATIONS  (PRN):  acetaminophen     Tablet .. 650 milliGRAM(s) Oral every 6 hours PRN Temp greater or equal to 38C (100.4F), Mild Pain (1 - 3)  dextrose Oral Gel 15 Gram(s) Oral once PRN Blood Glucose LESS THAN 70 milliGRAM(s)/deciliter  melatonin 3 milliGRAM(s) Oral at bedtime PRN Insomnia    LABS:                        12.6   7.58  )-----------( 182      ( 16 Dec 2024 06:55 )             36.5     12-16    141  |  111[H]  |  6[L]  ----------------------------<  119[H]  3.7   |  25  |  1.00    Ca    9.2      16 Dec 2024 06:55  Phos  2.6     12-16  Mg     2.0     12-16    TPro  6.4  /  Alb  3.1[L]  /  TBili  0.4  /  DBili  x   /  AST  21  /  ALT  35  /  AlkPhos  81  12-16    Urinalysis with Rflx Culture (collected 14 Dec 2024 08:45)    RADIOLOGY  < from: CT Abdomen and Pelvis w/ IV Cont (12.12.24 @ 18:34) >  IMPRESSION:  Acute uncomplicated diverticulitis of the distal descending colon, new   compared to 11/22/2024.    Minimal residual wall thickening and inflammation surrounding the   proximal sigmoid colon, consistent with resolving sigmoid diverticulitis.    ASSESSMENT & PLAN  64 year old male with diverticulitis of the descending colon.    - Continue low residue diet  - Continue antibiotics; per discussion with ID, if tolerates lunch, can go on vantin 200mg PO BID and flagyl 500 PO BID x 5 days  - D/c planning with outpatient follow up  - Case to be discussed with Dr. Townsend

## 2024-12-16 NOTE — PROGRESS NOTE ADULT - ATTENDING COMMENTS
Patient seen at bedside.   Tolerated low fiber diet.  no acute overnight events  afebrile. Denies any diarrhea    Plan for discharge today   discussed with ID: vantin 200mg BID and flagyl 500mg BID for 5 more days  discussed with GI and surgery. plan for outpatient follow up.

## 2024-12-16 NOTE — PROGRESS NOTE ADULT - PROBLEM SELECTOR PLAN 4
Chronic   - will hold home regimen Jardiance  - LDSS   - Accuchecks   - hypoglycemia protocol   - F/u AM A1c
Chronic   - will hold home regimen Jardiance  - LDSS   - Accuchecks   - hypoglycemia protocol   - A1c 6.1
Chronic   - will hold home regimen Jardiance  - LDSS   - Accuchecks   - hypoglycemia protocol   - A1c 6.1
Chronic   - will hold home regimen Jardiance  - LDSS   - Accuchecks   - hypoglycemia protocol   - F/u AM A1c

## 2024-12-16 NOTE — PROGRESS NOTE ADULT - PROVIDER SPECIALTY LIST ADULT
Gastroenterology
Surgery
Surgery
Gastroenterology
Surgery
Surgery
Gastroenterology
Infectious Disease
Hospitalist

## 2024-12-16 NOTE — PROGRESS NOTE ADULT - SUBJECTIVE AND OBJECTIVE BOX
[facility]    Bryce Farmer NP    [address] [place] [address]  [phone]      Chief Complaint:  Patient is a 64y old  Male who presents with a chief complaint of Diverticulitis       HPI/ 24 hr events:   Patient seen and examined at bedside  Reports feeling well this morning   Had soft, brown stool this morning   Tolerating low residue diet   No fever, chills, nausea, vomiting or abdominal pain         REVIEW OF SYSTEMS:   General: Negative  HEENT: Negative  CV: Negative  Respiratory: Negative  GI: See HPI  : Negative  MSK: Negative  Hematologic: Negative  Skin: Negative    MEDICATIONS:   MEDICATIONS  (STANDING):  aspirin  chewable 81 milliGRAM(s) Oral daily  dextrose 5%. 1000 milliLiter(s) (50 mL/Hr) IV Continuous <Continuous>  dextrose 5%. 1000 milliLiter(s) (100 mL/Hr) IV Continuous <Continuous>  dextrose 50% Injectable 25 Gram(s) IV Push once  dextrose 50% Injectable 12.5 Gram(s) IV Push once  dextrose 50% Injectable 25 Gram(s) IV Push once  docusate sodium 100 milliGRAM(s) Oral three times a day  enoxaparin Injectable 40 milliGRAM(s) SubCutaneous every 24 hours  glucagon  Injectable 1 milliGRAM(s) IntraMuscular once  insulin lispro (ADMELOG) corrective regimen sliding scale   SubCutaneous three times a day before meals  insulin lispro (ADMELOG) corrective regimen sliding scale   SubCutaneous at bedtime  losartan 25 milliGRAM(s) Oral daily  metoprolol succinate ER 25 milliGRAM(s) Oral daily  niacin SR 1000 milliGRAM(s) Oral at bedtime  OLANZapine 2.5 milliGRAM(s) Oral at bedtime  pantoprazole    Tablet 40 milliGRAM(s) Oral before breakfast  piperacillin/tazobactam IVPB.. 3.375 Gram(s) IV Intermittent every 8 hours  rosuvastatin 40 milliGRAM(s) Oral at bedtime  saccharomyces boulardii 250 milliGRAM(s) Oral two times a day  sertraline 50 milliGRAM(s) Oral daily  tamsulosin 0.4 milliGRAM(s) Oral at bedtime  ticagrelor 90 milliGRAM(s) Oral every 12 hours    MEDICATIONS  (PRN):  acetaminophen     Tablet .. 650 milliGRAM(s) Oral every 6 hours PRN Temp greater or equal to 38C (100.4F), Mild Pain (1 - 3)  dextrose Oral Gel 15 Gram(s) Oral once PRN Blood Glucose LESS THAN 70 milliGRAM(s)/deciliter  melatonin 3 milliGRAM(s) Oral at bedtime PRN Insomnia      ALLERGIES:   Allergies    No Known Allergies    Intolerances        VITAL SIGNS:   Vital Signs Last 24 Hrs  T(C): 36.9 (16 Dec 2024 04:28), Max: 37 (15 Dec 2024 20:56)  T(F): 98.5 (16 Dec 2024 04:28), Max: 98.6 (15 Dec 2024 20:56)  HR: 77 (16 Dec 2024 04:28) (77 - 84)  BP: 127/78 (16 Dec 2024 04:28) (127/78 - 154/94)  BP(mean): --  RR: 18 (16 Dec 2024 04:28) (18 - 18)  SpO2: 95% (15 Dec 2024 20:56) (93% - 95%)    Parameters below as of 16 Dec 2024 04:28  Patient On (Oxygen Delivery Method): room air      I&O's Summary      PHYSICAL EXAM:   GENERAL:  No acute distress  HEENT:  NC/AT  CHEST:  No increased effort  HEART:  Regular rate  ABDOMEN:  Soft, non-tender, non-distended  EXTREMITIES: No cyanosis  SKIN:  Warm, dry  NEURO:  Calm, cooperative    LABS:                        12.6   7.58  )-----------( 182      ( 16 Dec 2024 06:55 )             36.5     12-16    141  |  111[H]  |  6[L]  ----------------------------<  119[H]  3.7   |  25  |  1.00    Ca    9.2      16 Dec 2024 06:55  Phos  2.6     12-16  Mg     2.0     12-16    TPro  6.4  /  Alb  3.1[L]  /  TBili  0.4  /  DBili  x   /  AST  21  /  ALT  35  /  AlkPhos  81  12-16    LIVER FUNCTIONS - ( 16 Dec 2024 06:55 )  Alb: 3.1 g/dL / Pro: 6.4 g/dL / ALK PHOS: 81 U/L / ALT: 35 U/L / AST: 21 U/L / GGT: x               Urinalysis with Rflx Culture (collected 14 Dec 2024 08:45)                                    RADIOLOGY & ADDITIONAL STUDIES:

## 2024-12-16 NOTE — PROGRESS NOTE ADULT - PROBLEM SELECTOR PLAN 5
Chronic   - continue home statin

## 2024-12-16 NOTE — PROGRESS NOTE ADULT - ASSESSMENT
Diverticulitis  Abdominal pain    CT A/P w/ IVC (12/12): Acute uncomplicated diverticulitis of the distal descending colon, new compared to 11/22/2024. Minimal residual wall thickening and inflammation surrounding the proximal sigmoid colon, consistent with resolving sigmoid diverticulitis.    Recommendations:  - Diet per surgery team, cleared for reg low fiber from GI standpoint  - IV abx, follow ID recs for PO abx upon d/c  - Pain management  - Anti-emetics PRN   - Monitor GI function  - Surgery team following for recurrent diverticulitis, has an appt with Dr. Townsend in January   - Will need outpatient GI follow up for interval colonoscopy  - To follow  - No objection from GI standpoint for d/c planning       I reviewed the overnight course of events on the unit, re-confirming the patient history. I discussed the care with the patient  Differential diagnosis and plan of care discussed with patient after the evaluation  35 minutes spent on total encounter of which more than fifty percent of the encounter was spent counseling and/or coordinating care by the attending physician.

## 2024-12-16 NOTE — PROGRESS NOTE ADULT - PROBLEM SELECTOR PLAN 2
Chronic  - continue home metoprolol succinate 25 mg daily and irbesartan -> conversion to losartan 25 mg daily with hold parameters   - monitor hemodynamics

## 2024-12-16 NOTE — PROGRESS NOTE ADULT - ASSESSMENT
65 yo male with PMHx of CAD s/p CABG s/p stent, HTN, HLD, prior diverticulitis, who presented with a cc of abdominal pain. Symptoms started last month, and since then has been on multiple course of PO antibiotics.     CT showed acute uncomplicated diverticulitis of the distal descending colon. Reports feeling much better today, no diarrhea over the weekend. He has no fever and no leukocytosis. Blood cultures remain no growth.    #Acute diverticulitis    -okay to switch Zosyn to cefpodoxime 200mg PO BID and Flagyl 500mg PO BID x 5 days  -follow cultures to completion  -outpatient follow up with GI and Surgery  -discussed with Dr. Holland and Dr. Townsend  -will sign off, please call ID if any further questions or concerns. Thank you.    Abigail Ordonez MD  Division of Infectious Diseases   Cell [phone] between 8am and 6pm   After 6pm and weekends please call ID service at [phone].     35 minutes spent on total encounter assessing patient, examination, chart review, counseling and coordinating care by the attending physician/nurse/care manager.

## 2024-12-16 NOTE — PROGRESS NOTE ADULT - SUBJECTIVE AND OBJECTIVE BOX
[facility]  INFECTIOUS DISEASES - Rakesh Nichols, [place] [address]  Tel: [phone]     Fax: [phone]  =======================================================    KALENADITI PETITT [id number]    Follow up: No fevers. Reports feeling better. Abdominal pain resolved. Denies any nausea or vomiting. Had no bowel movement over the weekend, but had 1 today after breakfast. Tolerated solid food this AM.    Allergies:  No Known Allergies      Antibiotics:  acetaminophen     Tablet .. 650 milliGRAM(s) Oral every 6 hours PRN  aspirin  chewable 81 milliGRAM(s) Oral daily  cefpodoxime 200 milliGRAM(s) Oral every 12 hours  dextrose 5%. 1000 milliLiter(s) IV Continuous <Continuous>  dextrose 5%. 1000 milliLiter(s) IV Continuous <Continuous>  dextrose 50% Injectable 25 Gram(s) IV Push once  dextrose 50% Injectable 12.5 Gram(s) IV Push once  dextrose 50% Injectable 25 Gram(s) IV Push once  dextrose Oral Gel 15 Gram(s) Oral once PRN  docusate sodium 100 milliGRAM(s) Oral three times a day  enoxaparin Injectable 40 milliGRAM(s) SubCutaneous every 24 hours  glucagon  Injectable 1 milliGRAM(s) IntraMuscular once  insulin lispro (ADMELOG) corrective regimen sliding scale   SubCutaneous three times a day before meals  insulin lispro (ADMELOG) corrective regimen sliding scale   SubCutaneous at bedtime  losartan 25 milliGRAM(s) Oral daily  melatonin 3 milliGRAM(s) Oral at bedtime PRN  metoprolol succinate ER 25 milliGRAM(s) Oral daily  metroNIDAZOLE    Tablet 500 milliGRAM(s) Oral every 12 hours  niacin SR 1000 milliGRAM(s) Oral at bedtime  OLANZapine 2.5 milliGRAM(s) Oral at bedtime  pantoprazole    Tablet 40 milliGRAM(s) Oral before breakfast  rosuvastatin 40 milliGRAM(s) Oral at bedtime  saccharomyces boulardii 250 milliGRAM(s) Oral two times a day  sertraline 50 milliGRAM(s) Oral daily  tamsulosin 0.4 milliGRAM(s) Oral at bedtime  ticagrelor 90 milliGRAM(s) Oral every 12 hours       REVIEW OF SYSTEMS:  CONSTITUTIONAL:  No Fever or chills  CARDIOVASCULAR:  No chest pain or SOB.  RESPIRATORY: No cough, no shortness of breath  GASTROINTESTINAL:  see history  GENITOURINARY:  No dysuria, frequency or urgency  MUSCULOSKELETAL:  no joint aches, no muscle pain  NEUROLOGIC:  No headache or dizziness  PSYCHIATRIC:  No disorder of thought or mood.     Physical Exam:  ICU Vital Signs Last 24 Hrs  T(C): 36.6 (16 Dec 2024 10:45), Max: 37 (15 Dec 2024 20:56)  T(F): 97.9 (16 Dec 2024 10:45), Max: 98.6 (15 Dec 2024 20:56)  HR: 93 (16 Dec 2024 10:45) (77 - 93)  BP: 115/76 (16 Dec 2024 10:45) (115/76 - 154/94)  BP(mean): --  ABP: --  ABP(mean): --  RR: 18 (16 Dec 2024 10:45) (18 - 18)  SpO2: 94% (16 Dec 2024 10:45) (94% - 95%)    O2 Parameters below as of 16 Dec 2024 10:45  Patient On (Oxygen Delivery Method): room air      GEN: NAD  HEENT: normocephalic and atraumatic.   NECK: Supple.   LUNGS: Normal respiratory effort  HEART: Regular rate and rhythm   ABDOMEN: Soft, nondistended. No tenderness  EXTREMITIES: No leg edema  NEUROLOGIC: grossly intact.  PSYCHIATRIC: Appropriate affect .  Labs:  12-16    141  |  111[H]  |  6[L]  ----------------------------<  119[H]  3.7   |  25  |  1.00    Ca    9.2      16 Dec 2024 06:55  Phos  2.6     12-16  Mg     2.0     12-16    TPro  6.4  /  Alb  3.1[L]  /  TBili  0.4  /  DBili  x   /  AST  21  /  ALT  35  /  AlkPhos  81  12-16                          12.6   7.58  )-----------( 182      ( 16 Dec 2024 06:55 )             36.5       Urinalysis Basic - ( 16 Dec 2024 06:55 )    Color: x / Appearance: x / SG: x / pH: x  Gluc: 119 mg/dL / Ketone: x  / Bili: x / Urobili: x   Blood: x / Protein: x / Nitrite: x   Leuk Esterase: x / RBC: x / WBC x   Sq Epi: x / Non Sq Epi: x / Bacteria: x      LIVER FUNCTIONS - ( 16 Dec 2024 06:55 )  Alb: 3.1 g/dL / Pro: 6.4 g/dL / ALK PHOS: 81 U/L / ALT: 35 U/L / AST: 21 U/L / GGT: x             RECENT CULTURES:  12-12 @ 18:05 .Blood BLOOD     No growth at 72 Hours        12-12 @ 18:00 .Blood BLOOD     No growth at 72 Hours              All imaging and data are reviewed.

## 2024-12-16 NOTE — PROGRESS NOTE ADULT - SUBJECTIVE AND OBJECTIVE BOX
Patient is a 64y old  Male who presents with a chief complaint of Diverticulitis (15 Dec 2024 11:54)      INTERVAL HPI/OVERNIGHT EVENTS: No acute overnight events. Pt was seen and examined at bedside. Pt had low fiber diet this AM and reports he is tolerating it well. Pt reports large BM this morning after being constipated the last 2-3 days. Reports the abdominal pain has greatly improved. Denies HA, nausea, vomiting, diarrhea, chest pain, SOB.   No other complaints at this time.     MEDICATIONS  (STANDING):  aspirin  chewable 81 milliGRAM(s) Oral daily  dextrose 5%. 1000 milliLiter(s) (50 mL/Hr) IV Continuous <Continuous>  dextrose 5%. 1000 milliLiter(s) (100 mL/Hr) IV Continuous <Continuous>  dextrose 50% Injectable 25 Gram(s) IV Push once  dextrose 50% Injectable 25 Gram(s) IV Push once  dextrose 50% Injectable 12.5 Gram(s) IV Push once  docusate sodium 100 milliGRAM(s) Oral three times a day  enoxaparin Injectable 40 milliGRAM(s) SubCutaneous every 24 hours  glucagon  Injectable 1 milliGRAM(s) IntraMuscular once  insulin lispro (ADMELOG) corrective regimen sliding scale   SubCutaneous three times a day before meals  insulin lispro (ADMELOG) corrective regimen sliding scale   SubCutaneous at bedtime  losartan 25 milliGRAM(s) Oral daily  metoprolol succinate ER 25 milliGRAM(s) Oral daily  niacin SR 1000 milliGRAM(s) Oral at bedtime  OLANZapine 2.5 milliGRAM(s) Oral at bedtime  pantoprazole    Tablet 40 milliGRAM(s) Oral before breakfast  piperacillin/tazobactam IVPB.. 3.375 Gram(s) IV Intermittent every 8 hours  rosuvastatin 40 milliGRAM(s) Oral at bedtime  saccharomyces boulardii 250 milliGRAM(s) Oral two times a day  sertraline 50 milliGRAM(s) Oral daily  tamsulosin 0.4 milliGRAM(s) Oral at bedtime  ticagrelor 90 milliGRAM(s) Oral every 12 hours    MEDICATIONS  (PRN):  acetaminophen     Tablet .. 650 milliGRAM(s) Oral every 6 hours PRN Temp greater or equal to 38C (100.4F), Mild Pain (1 - 3)  dextrose Oral Gel 15 Gram(s) Oral once PRN Blood Glucose LESS THAN 70 milliGRAM(s)/deciliter  melatonin 3 milliGRAM(s) Oral at bedtime PRN Insomnia      Allergies    No Known Allergies    Intolerances        REVIEW OF SYSTEMS:  CONSTITUTIONAL: No fever or chills  HEENT:  No headache, no sore throat  RESPIRATORY: No cough, wheezing, or shortness of breath  CARDIOVASCULAR: No chest pain, palpitations  GASTROINTESTINAL: No abd pain, nausea, vomiting, or diarrhea  GENITOURINARY: No dysuria, frequency, or hematuria  NEUROLOGICAL: no focal weakness or dizziness  MUSCULOSKELETAL: no myalgias     Vital Signs Last 24 Hrs  T(C): 36.9 (16 Dec 2024 04:28), Max: 37 (15 Dec 2024 20:56)  T(F): 98.5 (16 Dec 2024 04:28), Max: 98.6 (15 Dec 2024 20:56)  HR: 77 (16 Dec 2024 04:28) (77 - 84)  BP: 127/78 (16 Dec 2024 04:28) (127/78 - 154/94)  BP(mean): --  RR: 18 (16 Dec 2024 04:28) (18 - 18)  SpO2: 95% (15 Dec 2024 20:56) (93% - 95%)    Parameters below as of 16 Dec 2024 04:28  Patient On (Oxygen Delivery Method): room air        PHYSICAL EXAM:  GENERAL: NAD  HEENT:  anicteric, moist mucous membranes  CHEST/LUNG:  CTA b/l, no rales, wheezes, or rhonchi  HEART:  RRR, S1, S2  ABDOMEN:  BS+, soft, nontender, nondistended  EXTREMITIES: no edema, cyanosis, or calf tenderness  NERVOUS SYSTEM: answers questions and follows commands appropriately    LABS:                        12.6   7.58  )-----------( 182      ( 16 Dec 2024 06:55 )             36.5     CBC Full  -  ( 16 Dec 2024 06:55 )  WBC Count : 7.58 K/uL  Hemoglobin : 12.6 g/dL  Hematocrit : 36.5 %  Platelet Count - Automated : 182 K/uL  Mean Cell Volume : 87.7 fl  Mean Cell Hemoglobin : 30.3 pg  Mean Cell Hemoglobin Concentration : 34.5 g/dL  Auto Neutrophil # : 4.79 K/uL  Auto Lymphocyte # : 2.27 K/uL  Auto Monocyte # : 0.37 K/uL  Auto Eosinophil # : 0.10 K/uL  Auto Basophil # : 0.03 K/uL  Auto Neutrophil % : 63.2 %  Auto Lymphocyte % : 29.9 %  Auto Monocyte % : 4.9 %  Auto Eosinophil % : 1.3 %  Auto Basophil % : 0.4 %    16 Dec 2024 06:55    141    |  111    |  6      ----------------------------<  119    3.7     |  25     |  1.00     Ca    9.2        16 Dec 2024 06:55  Phos  2.6       16 Dec 2024 06:55  Mg     2.0       16 Dec 2024 06:55    TPro  6.4    /  Alb  3.1    /  TBili  0.4    /  DBili  x      /  AST  21     /  ALT  35     /  AlkPhos  81     16 Dec 2024 06:55      Urinalysis Basic - ( 16 Dec 2024 06:55 )    Color: x / Appearance: x / SG: x / pH: x  Gluc: 119 mg/dL / Ketone: x  / Bili: x / Urobili: x   Blood: x / Protein: x / Nitrite: x   Leuk Esterase: x / RBC: x / WBC x   Sq Epi: x / Non Sq Epi: x / Bacteria: x      CAPILLARY BLOOD GLUCOSE      POCT Blood Glucose.: 127 mg/dL (16 Dec 2024 07:18)  POCT Blood Glucose.: 100 mg/dL (15 Dec 2024 23:20)  POCT Blood Glucose.: 93 mg/dL (15 Dec 2024 16:32)  POCT Blood Glucose.: 125 mg/dL (15 Dec 2024 11:39)        Urinalysis with Rflx Culture (collected 12-14-24 @ 08:45)    Culture - Blood (collected 12-12-24 @ 18:05)  Source: .Blood BLOOD  Preliminary Report (12-16-24 @ 04:00):    No growth at 72 Hours    Culture - Blood (collected 12-12-24 @ 18:00)  Source: .Blood BLOOD  Preliminary Report (12-16-24 @ 04:00):    No growth at 72 Hours        RADIOLOGY & ADDITIONAL TESTS: ____    Personally reviewed.     Consultant(s) Notes Reviewed:  [x] YES  [ ] NO

## 2024-12-16 NOTE — PROGRESS NOTE ADULT - PROBLEM SELECTOR PLAN 1
- Acute uncomplicated diverticulitis of the distal descending colon, resolving sigmoid diverticulitis.  - s/p outpatient course of augmentin, cipro/flagyl, and flagyl/cefpodoxime   - s/p Zofran 4mg IVP x1, 2.3L NS bolus x1, Zosyn x1 in the ED   - continue zosyn until Monday   - probiotics  - bowel rest, tolerating clear liquid diet well  - FU C diff, (isolation precautions), GI PCR   - RVP negative  - BCx NGTD  - IV hydration  - GI, Dr. Son, consulted; recommends Clear liquid diet, advance to low fiber as tolerated  - Surgery consulted; recommends medical management, outpatient f/u with Dr. Townsend in Jan  - ID consulted, Dr. Nichols, f/u recs - Acute uncomplicated diverticulitis of the distal descending colon, resolving sigmoid diverticulitis.  - s/p outpatient course of augmentin, cipro/flagyl, and flagyl/cefpodoxime   - s/p Zofran 4mg IVP x1, 2.3L NS bolus x1, Zosyn x1 in the ED   - continue zosyn until Monday   - probiotics  - bowel rest, tolerating clear liquid diet well  - FU C diff, (isolation precautions), GI PCR   - RVP negative  - BCx NGTD  - IV hydration  - can d/c if pt can tolerate PO abx, pending ID recs  - GI, Dr. Son, consulted; recommends Clear liquid diet, advance to low fiber as tolerated  - Surgery consulted; recommends medical management, outpatient f/u with Dr. Townsend in Jan  - ID consulted, Dr. Nichols, f/u recs - Acute uncomplicated diverticulitis of the distal descending colon, resolving sigmoid diverticulitis.  - s/p outpatient course of augmentin, cipro/flagyl, and flagyl/cefpodoxime   - s/p Zofran 4mg IVP x1, 2.3L NS bolus x1, Zosyn x1 in the ED   - continue zosyn until Monday   - probiotics  - bowel rest, tolerating clear liquid diet well  - FU C diff, (isolation precautions), GI PCR   - RVP negative  - BCx NGTD  - IV hydration  - started on low fiber diet this AM, tolerating well   - can d/c if pt can tolerate PO abx, pending ID recs  - GI, Dr. Son, consulted; recommends Clear liquid diet, advance to low fiber as tolerated  - Surgery consulted; recommends medical management, outpatient f/u with Dr. Townsend in Jan  - ID consulted, Dr. Nichols, f/u recs

## 2024-12-16 NOTE — PROGRESS NOTE ADULT - TIME BILLING
Reviewed with patient in detail, Hospitalist, GI and ID attendings, Surgical PA's as well as today's RN.
Reviewed with patient in detail, Hospitalist, Surgical PA's as well as today's RN team.
Reviewed with patient in detail, Surgical PA as well as today's RN team.
Reviewed with patient in detail, Hospitalist, GI and ID attendings, Surgical PA's as well as today's RN team.

## 2024-12-16 NOTE — DISCHARGE NOTE NURSING/CASE MANAGEMENT/SOCIAL WORK - NSDCPEPTCAREGIVEDUMATLIST _GEN_ALL_CORE
"----- Message from Maite Avendano sent at 7/3/2023 12:22 PM CDT -----  Regarding: Pt advice  Contact: Pt     Pt requesting call back in regards to chemo treatments coming up.   Please call and adv @       Confirmed contact below:   Contact Name: Dov Tejeda Mary  Phone Number: [phone]               Additional Notes:  "Thank you for all that you do for our patients"                                           "
Diabetes

## 2024-12-16 NOTE — PROGRESS NOTE ADULT - ASSESSMENT
As in above full PA notation, unless countered in the below.  Quiet overnight.  No acute events today.    Mr. Arias personally seen/ examined during daily rounds.  He denies abdominal pain and reports toleration of advancement of diet with formed BM today. personally seen/ examined during daily rounds.  Vitals remain non-suggestive.  Abdomen soft with LLQ free of ANY tenderness despite DEEPEST palpation.  Labs reassuring from today without leukocytosis or shift or any acidosis on chemistry.  Clinically, improving overall with supportive care for persistent/ but uncomplicated sigmoid/ left colon diverticulitis.  Surgically, stable for present without indication for immediate intervention.  No objection to discharge following lunch if patient is well and ID feels oral ABX appropriate/ plan in place.  Reviewed with patient in detail:  -low residue diet until seen in office; daily use or MiraLAX or Colace/ Docusate; daily use of probiotic or Greek style yogurt while on oral ABX  -follow-up with GI (for interval colonoscopy) and follow-up with surgery in preparation for elective interval resection

## 2024-12-16 NOTE — DISCHARGE NOTE NURSING/CASE MANAGEMENT/SOCIAL WORK - PATIENT PORTAL LINK FT
You can access the FollowMyHealth Patient Portal offered by [facility] by registering at the following website: [email or web address]. By joining [facility]’s FollowMyHealth portal, you will also be able to view your health information using other applications (apps) compatible with our system.

## 2025-01-15 ENCOUNTER — APPOINTMENT (OUTPATIENT)
Dept: SURGERY | Facility: CLINIC | Age: 65
End: 2025-01-15
Payer: MEDICARE

## 2025-01-15 ENCOUNTER — NON-APPOINTMENT (OUTPATIENT)
Age: 65
End: 2025-01-15

## 2025-01-15 VITALS
HEIGHT: 69.5 IN | SYSTOLIC BLOOD PRESSURE: 118 MMHG | HEART RATE: 95 BPM | WEIGHT: 233.91 LBS | BODY MASS INDEX: 33.87 KG/M2 | TEMPERATURE: 97.2 F | OXYGEN SATURATION: 96 % | DIASTOLIC BLOOD PRESSURE: 80 MMHG

## 2025-01-15 DIAGNOSIS — I49.9 CARDIAC ARRHYTHMIA, UNSPECIFIED: ICD-10-CM

## 2025-01-15 DIAGNOSIS — K57.32 DIVERTICULITIS OF LARGE INTESTINE W/OUT PERFORATION OR ABSCESS W/OUT BLEEDING: ICD-10-CM

## 2025-01-15 DIAGNOSIS — F41.9 ANXIETY DISORDER, UNSPECIFIED: ICD-10-CM

## 2025-01-15 DIAGNOSIS — Z87.19 PERSONAL HISTORY OF OTHER DISEASES OF THE DIGESTIVE SYSTEM: ICD-10-CM

## 2025-01-15 PROCEDURE — 99215 OFFICE O/P EST HI 40 MIN: CPT

## 2025-01-15 RX ORDER — TAMSULOSIN HYDROCHLORIDE 0.4 MG/1
0.4 CAPSULE ORAL DAILY
Refills: 0 | Status: ACTIVE | COMMUNITY

## 2025-02-13 RX ORDER — MULTIVIT-MIN/FOLIC/VIT K/LYCOP 400-300MCG
50 MCG TABLET ORAL
Refills: 0 | Status: ACTIVE | COMMUNITY

## 2025-02-13 RX ORDER — PANTOPRAZOLE SODIUM 40 MG/1
40 TABLET, DELAYED RELEASE ORAL
Refills: 0 | Status: ACTIVE | COMMUNITY

## 2025-02-13 RX ORDER — TAMSULOSIN HYDROCHLORIDE 0.4 MG/1
0.4 CAPSULE ORAL
Refills: 0 | Status: ACTIVE | COMMUNITY

## 2025-02-13 RX ORDER — OLANZAPINE 2.5 MG/1
2.5 TABLET ORAL
Refills: 0 | Status: ACTIVE | COMMUNITY

## 2025-02-13 RX ORDER — ROSUVASTATIN CALCIUM 40 MG/1
40 TABLET, FILM COATED ORAL DAILY
Refills: 0 | Status: ACTIVE | COMMUNITY

## 2025-02-20 ENCOUNTER — NON-APPOINTMENT (OUTPATIENT)
Age: 65
End: 2025-02-20

## 2025-02-20 ENCOUNTER — APPOINTMENT (OUTPATIENT)
Dept: CARDIOLOGY | Facility: CLINIC | Age: 65
End: 2025-02-20
Payer: MEDICARE

## 2025-02-20 VITALS
BODY MASS INDEX: 34.17 KG/M2 | HEART RATE: 110 BPM | TEMPERATURE: 97.7 F | OXYGEN SATURATION: 95 % | WEIGHT: 236 LBS | HEIGHT: 69.5 IN | DIASTOLIC BLOOD PRESSURE: 83 MMHG | SYSTOLIC BLOOD PRESSURE: 126 MMHG

## 2025-02-20 DIAGNOSIS — I10 ESSENTIAL (PRIMARY) HYPERTENSION: ICD-10-CM

## 2025-02-20 DIAGNOSIS — I25.10 ATHEROSCLEROTIC HEART DISEASE OF NATIVE CORONARY ARTERY W/OUT ANGINA PECTORIS: ICD-10-CM

## 2025-02-20 PROCEDURE — 93000 ELECTROCARDIOGRAM COMPLETE: CPT

## 2025-02-20 PROCEDURE — 99213 OFFICE O/P EST LOW 20 MIN: CPT

## 2025-03-31 ENCOUNTER — OUTPATIENT (OUTPATIENT)
Dept: OUTPATIENT SERVICES | Facility: HOSPITAL | Age: 65
LOS: 1 days | End: 2025-03-31
Payer: MEDICARE

## 2025-03-31 ENCOUNTER — NON-APPOINTMENT (OUTPATIENT)
Age: 65
End: 2025-03-31

## 2025-03-31 ENCOUNTER — RESULT REVIEW (OUTPATIENT)
Age: 65
End: 2025-03-31

## 2025-03-31 ENCOUNTER — APPOINTMENT (OUTPATIENT)
Dept: CV DIAGNOSTICS | Facility: HOSPITAL | Age: 65
End: 2025-03-31

## 2025-03-31 DIAGNOSIS — I25.10 ATHEROSCLEROTIC HEART DISEASE OF NATIVE CORONARY ARTERY WITHOUT ANGINA PECTORIS: ICD-10-CM

## 2025-03-31 DIAGNOSIS — K46.9 UNSPECIFIED ABDOMINAL HERNIA WITHOUT OBSTRUCTION OR GANGRENE: Chronic | ICD-10-CM

## 2025-03-31 DIAGNOSIS — Q15.9 CONGENITAL MALFORMATION OF EYE, UNSPECIFIED: Chronic | ICD-10-CM

## 2025-03-31 DIAGNOSIS — I25.10 ATHEROSCLEROTIC HEART DISEASE OF NATIVE CORONARY ARTERY WITHOUT ANGINA PECTORIS: Chronic | ICD-10-CM

## 2025-03-31 DIAGNOSIS — Z95.1 PRESENCE OF AORTOCORONARY BYPASS GRAFT: Chronic | ICD-10-CM

## 2025-03-31 DIAGNOSIS — Z95.5 PRESENCE OF CORONARY ANGIOPLASTY IMPLANT AND GRAFT: Chronic | ICD-10-CM

## 2025-03-31 PROCEDURE — 93018 CV STRESS TEST I&R ONLY: CPT | Mod: GC

## 2025-03-31 PROCEDURE — 93016 CV STRESS TEST SUPVJ ONLY: CPT | Mod: GC

## 2025-04-01 NOTE — ED PROVIDER NOTE - TOBACCO USE
Amalia is a 16 year old female.    Chief Complaint & HPI     The patient presents for contact lens check. She reports satisfactory experience with current lenses.    Past medical history, past surgical history, social history, family history, current medications, allergies have been personally reviewed. See eye exam section of the EHR for this visit's findings.    Assessment & Plan  Myopia of both eyes with astigmatism  Adequate contact lens fit, each eye.   Finalized contact lens prescription provided.    Monitor for myopic progression and consider low dose atropine if changes occur.  Poor adaptation to center D multifocal lenses.    Return in about 1 year (around 4/1/2026) for comprehensive eye exam, sooner as needed.    Cortney Ibarra, OD     Unknown if ever smoked

## 2025-05-22 NOTE — PROGRESS NOTE ADULT - PROBLEM SELECTOR PROBLEM 3
I don't see it in [facility] notes either but he told me that he talk to the cardiologist at [facility] and they said that he could switch to Eliquis after 3 months  He knew all the details about changing from warfarin to Eliquis.      I believe one of the reasons he was initially started on warfarin postoperatively was he was in renal failure and on dialysis.    His renal failure has resolved and his renal function is normal.\    He was on Eliquis before surgery for paroxysmal atrial fibrillation.    I contacted [place] and he will  check with the cardiology department at [facility] about making the change.  That    Right now I would think it is fine for him to change to Eliquis based on the information below.      I reviewed the article noted below from up-to-date      Antithrombotic therapy for surgical bioprosthetic valves and surgical valve repair  Authors:  Zakia Santos MD, MPH  Aubrie Aguilar MD  Section Editors:  MD Boy Redding MD  [place] Editors:  Susan B Yeon, MD, BC Calvert MD  Contributor Disclosures  All topics are updated as new evidence becomes available and our peer review process is complete.  Literature review current through: Apr 2025.  This topic last updated: Nov 04, 2024.    Antithrombotic therapy for a surgical bioprosthetic valve (algorithm 1)   ·With no concurrent indication for anticoagulation   -With low bleeding risk - For patients with a surgical bioprosthetic valve with no concurrent indication for anticoagulation and low bleeding risk, we suggest anticoagulation for three to six months after implantation (Grade 2C). We suggest a vitamin K antagonist (VKA) with a goal international normalized ratio (INR) of 2.5 rather than a direct oral anticoagulant (DOAC) (Grade 2C). Evidence on DOAC use in this setting is lacking. After the period of anticoagulation, we suggest aspirin 75 to 100 mg per day (Grade 2C).    With a concurrent indication for 
anticoagulation - For patients with a surgical bioprosthetic valve who have a concurrent indication for anticoagulation (such as AF), the choice of anticoagulant and duration of therapy is based upon the concurrent indication and clinical factors (including risk of bleeding). As an example, a DOAC is generally preferred for patients with AF who require anticoagulation. (See 'With a concurrent indication for anticoagulation' above and \"Atrial fibrillation in adults: Use of oral anticoagulants\", section on 'Approach to anticoagulation' and \"Transcatheter aortic valve implantation: Antithrombotic therapy\", section on 'Introduction'.)    This approach is supported by the results of the RIVER trial, in which 1005 patients with a bioprosthetic mitral valve and AF were randomly assigned to treatment with rivaroxaban (20 mg once daily) or dose-adjusted warfarin (target INR 2 to 3) [27]. The incidence of stroke was lower in the rivaroxaban group (0.6 versus 2.4 percent; HR 0.25, 95% CI 0.07-0.88). Mortality from cardiovascular causes or thromboembolic events occurred at similar rates in the two groups (3.4 and 5.1 percent; HR 0.65, 95% CI 0.35-1.20). Major bleeding also occurred at similar rates in the two groups (1.4 and 2.6 percent; HR 0.54, 95% CI 0.21-1.35).   
CAD (coronary artery disease)

## 2025-07-01 NOTE — ED PROVIDER NOTE - CROS ED NEURO ALL NEG
Quality 226: Preventive Care And Screening: Tobacco Use: Screening And Cessation Intervention: Patient screened for tobacco use and is an ex/non-smoker Quality 130: Documentation Of Current Medications In The Medical Record: Current Medications Documented Detail Level: Detailed Quality 431: Preventive Care And Screening: Unhealthy Alcohol Use - Screening: Patient not identified as an unhealthy alcohol user when screened for unhealthy alcohol use using a systematic screening method - - -

## 2025-07-03 ENCOUNTER — APPOINTMENT (OUTPATIENT)
Dept: CARDIOLOGY | Facility: CLINIC | Age: 65
End: 2025-07-03
Payer: MEDICARE

## 2025-07-03 ENCOUNTER — NON-APPOINTMENT (OUTPATIENT)
Age: 65
End: 2025-07-03

## 2025-07-03 VITALS
BODY MASS INDEX: 32.99 KG/M2 | WEIGHT: 233 LBS | DIASTOLIC BLOOD PRESSURE: 74 MMHG | OXYGEN SATURATION: 98 % | HEIGHT: 70.5 IN | RESPIRATION RATE: 17 BRPM | HEART RATE: 73 BPM | TEMPERATURE: 98.4 F | SYSTOLIC BLOOD PRESSURE: 113 MMHG

## 2025-07-03 PROCEDURE — 99214 OFFICE O/P EST MOD 30 MIN: CPT

## 2025-07-03 PROCEDURE — 93000 ELECTROCARDIOGRAM COMPLETE: CPT

## 2025-07-18 ENCOUNTER — INPATIENT (INPATIENT)
Facility: HOSPITAL | Age: 65
LOS: 1 days | Discharge: HOME | End: 2025-07-20
Attending: STUDENT IN AN ORGANIZED HEALTH CARE EDUCATION/TRAINING PROGRAM | Admitting: STUDENT IN AN ORGANIZED HEALTH CARE EDUCATION/TRAINING PROGRAM
Payer: MEDICARE

## 2025-07-18 VITALS
DIASTOLIC BLOOD PRESSURE: 86 MMHG | HEIGHT: 70 IN | SYSTOLIC BLOOD PRESSURE: 143 MMHG | WEIGHT: 229.94 LBS | TEMPERATURE: 98 F | RESPIRATION RATE: 18 BRPM | OXYGEN SATURATION: 99 % | HEART RATE: 107 BPM

## 2025-07-18 DIAGNOSIS — E11.9 TYPE 2 DIABETES MELLITUS WITHOUT COMPLICATIONS: ICD-10-CM

## 2025-07-18 DIAGNOSIS — I21.9 ACUTE MYOCARDIAL INFARCTION, UNSPECIFIED: ICD-10-CM

## 2025-07-18 DIAGNOSIS — F41.9 ANXIETY DISORDER, UNSPECIFIED: ICD-10-CM

## 2025-07-18 DIAGNOSIS — I20.0 UNSTABLE ANGINA: ICD-10-CM

## 2025-07-18 DIAGNOSIS — Z95.1 PRESENCE OF AORTOCORONARY BYPASS GRAFT: Chronic | ICD-10-CM

## 2025-07-18 DIAGNOSIS — Z95.5 PRESENCE OF CORONARY ANGIOPLASTY IMPLANT AND GRAFT: Chronic | ICD-10-CM

## 2025-07-18 DIAGNOSIS — K46.9 UNSPECIFIED ABDOMINAL HERNIA WITHOUT OBSTRUCTION OR GANGRENE: Chronic | ICD-10-CM

## 2025-07-18 DIAGNOSIS — Q15.9 CONGENITAL MALFORMATION OF EYE, UNSPECIFIED: Chronic | ICD-10-CM

## 2025-07-18 DIAGNOSIS — I25.10 ATHEROSCLEROTIC HEART DISEASE OF NATIVE CORONARY ARTERY WITHOUT ANGINA PECTORIS: ICD-10-CM

## 2025-07-18 DIAGNOSIS — R07.9 CHEST PAIN, UNSPECIFIED: ICD-10-CM

## 2025-07-18 DIAGNOSIS — Z29.9 ENCOUNTER FOR PROPHYLACTIC MEASURES, UNSPECIFIED: ICD-10-CM

## 2025-07-18 DIAGNOSIS — E78.5 HYPERLIPIDEMIA, UNSPECIFIED: ICD-10-CM

## 2025-07-18 DIAGNOSIS — I10 ESSENTIAL (PRIMARY) HYPERTENSION: ICD-10-CM

## 2025-07-18 DIAGNOSIS — I25.10 ATHEROSCLEROTIC HEART DISEASE OF NATIVE CORONARY ARTERY WITHOUT ANGINA PECTORIS: Chronic | ICD-10-CM

## 2025-07-18 LAB
ADD ON TEST-SPECIMEN IN LAB: SIGNIFICANT CHANGE UP
ADD ON TEST-SPECIMEN IN LAB: SIGNIFICANT CHANGE UP
ALBUMIN SERPL ELPH-MCNC: 4.4 G/DL — SIGNIFICANT CHANGE UP (ref 3.3–5)
ALP SERPL-CCNC: 103 U/L — SIGNIFICANT CHANGE UP (ref 40–120)
ALT FLD-CCNC: 28 U/L — SIGNIFICANT CHANGE UP (ref 4–41)
ANION GAP SERPL CALC-SCNC: 11 MMOL/L — SIGNIFICANT CHANGE UP (ref 7–14)
APTT BLD: 27.8 SEC — SIGNIFICANT CHANGE UP (ref 26.1–36.8)
AST SERPL-CCNC: 23 U/L — SIGNIFICANT CHANGE UP (ref 4–40)
BASOPHILS # BLD AUTO: 0.02 K/UL — SIGNIFICANT CHANGE UP (ref 0–0.2)
BASOPHILS NFR BLD AUTO: 0.2 % — SIGNIFICANT CHANGE UP (ref 0–2)
BILIRUB SERPL-MCNC: 1 MG/DL — SIGNIFICANT CHANGE UP (ref 0.2–1.2)
BUN SERPL-MCNC: 12 MG/DL — SIGNIFICANT CHANGE UP (ref 7–23)
CALCIUM SERPL-MCNC: 9.8 MG/DL — SIGNIFICANT CHANGE UP (ref 8.4–10.5)
CHLORIDE SERPL-SCNC: 105 MMOL/L — SIGNIFICANT CHANGE UP (ref 98–107)
CO2 SERPL-SCNC: 22 MMOL/L — SIGNIFICANT CHANGE UP (ref 22–31)
CREAT SERPL-MCNC: 0.95 MG/DL — SIGNIFICANT CHANGE UP (ref 0.5–1.3)
EGFR: 90 ML/MIN/1.73M2 — SIGNIFICANT CHANGE UP
EGFR: 90 ML/MIN/1.73M2 — SIGNIFICANT CHANGE UP
EOSINOPHIL # BLD AUTO: 0.04 K/UL — SIGNIFICANT CHANGE UP (ref 0–0.5)
EOSINOPHIL NFR BLD AUTO: 0.5 % — SIGNIFICANT CHANGE UP (ref 0–6)
FLUAV AG NPH QL: SIGNIFICANT CHANGE UP
FLUBV AG NPH QL: SIGNIFICANT CHANGE UP
GLUCOSE SERPL-MCNC: 104 MG/DL — HIGH (ref 70–99)
HCT VFR BLD CALC: 38.2 % — LOW (ref 39–50)
HGB BLD-MCNC: 13.7 G/DL — SIGNIFICANT CHANGE UP (ref 13–17)
IMM GRANULOCYTES # BLD AUTO: 0.03 K/UL — SIGNIFICANT CHANGE UP (ref 0–0.07)
IMM GRANULOCYTES NFR BLD AUTO: 0.4 % — SIGNIFICANT CHANGE UP (ref 0–0.9)
INR BLD: 1.04 RATIO — SIGNIFICANT CHANGE UP (ref 0.85–1.16)
LYMPHOCYTES # BLD AUTO: 1.91 K/UL — SIGNIFICANT CHANGE UP (ref 1–3.3)
LYMPHOCYTES NFR BLD AUTO: 22.3 % — SIGNIFICANT CHANGE UP (ref 13–44)
MAGNESIUM SERPL-MCNC: 1.9 MG/DL — SIGNIFICANT CHANGE UP (ref 1.6–2.6)
MCHC RBC-ENTMCNC: 31.6 PG — SIGNIFICANT CHANGE UP (ref 27–34)
MCHC RBC-ENTMCNC: 35.9 G/DL — SIGNIFICANT CHANGE UP (ref 32–36)
MCV RBC AUTO: 88 FL — SIGNIFICANT CHANGE UP (ref 80–100)
MONOCYTES # BLD AUTO: 0.33 K/UL — SIGNIFICANT CHANGE UP (ref 0–0.9)
MONOCYTES NFR BLD AUTO: 3.9 % — SIGNIFICANT CHANGE UP (ref 2–14)
NEUTROPHILS # BLD AUTO: 6.24 K/UL — SIGNIFICANT CHANGE UP (ref 1.8–7.4)
NEUTROPHILS NFR BLD AUTO: 72.7 % — SIGNIFICANT CHANGE UP (ref 43–77)
NRBC # BLD AUTO: 0 K/UL — SIGNIFICANT CHANGE UP (ref 0–0)
NRBC # FLD: 0 K/UL — SIGNIFICANT CHANGE UP (ref 0–0)
NRBC BLD AUTO-RTO: 0 /100 WBCS — SIGNIFICANT CHANGE UP (ref 0–0)
NT-PROBNP SERPL-SCNC: 38 PG/ML — SIGNIFICANT CHANGE UP
PLATELET # BLD AUTO: 177 K/UL — SIGNIFICANT CHANGE UP (ref 150–400)
PMV BLD: 9.8 FL — SIGNIFICANT CHANGE UP (ref 7–13)
POTASSIUM SERPL-MCNC: 4.1 MMOL/L — SIGNIFICANT CHANGE UP (ref 3.5–5.3)
POTASSIUM SERPL-SCNC: 4.1 MMOL/L — SIGNIFICANT CHANGE UP (ref 3.5–5.3)
PROT SERPL-MCNC: 7.1 G/DL — SIGNIFICANT CHANGE UP (ref 6–8.3)
PROTHROM AB SERPL-ACNC: 12.1 SEC — SIGNIFICANT CHANGE UP (ref 9.9–13.4)
RBC # BLD: 4.34 M/UL — SIGNIFICANT CHANGE UP (ref 4.2–5.8)
RBC # FLD: 12.9 % — SIGNIFICANT CHANGE UP (ref 10.3–14.5)
RSV RNA NPH QL NAA+NON-PROBE: SIGNIFICANT CHANGE UP
SARS-COV-2 RNA SPEC QL NAA+PROBE: SIGNIFICANT CHANGE UP
SODIUM SERPL-SCNC: 138 MMOL/L — SIGNIFICANT CHANGE UP (ref 135–145)
SOURCE RESPIRATORY: SIGNIFICANT CHANGE UP
TROPONIN T, HIGH SENSITIVITY RESULT: 7 NG/L — SIGNIFICANT CHANGE UP
TROPONIN T, HIGH SENSITIVITY RESULT: <6 NG/L — SIGNIFICANT CHANGE UP
WBC # BLD: 8.57 K/UL — SIGNIFICANT CHANGE UP (ref 3.8–10.5)
WBC # FLD AUTO: 8.57 K/UL — SIGNIFICANT CHANGE UP (ref 3.8–10.5)

## 2025-07-18 PROCEDURE — 99222 1ST HOSP IP/OBS MODERATE 55: CPT

## 2025-07-18 PROCEDURE — 99285 EMERGENCY DEPT VISIT HI MDM: CPT

## 2025-07-18 PROCEDURE — 99223 1ST HOSP IP/OBS HIGH 75: CPT

## 2025-07-18 PROCEDURE — 71046 X-RAY EXAM CHEST 2 VIEWS: CPT | Mod: 26

## 2025-07-18 RX ORDER — ISOSORBIDE MONONITRATE 60 MG/1
30 TABLET, EXTENDED RELEASE ORAL DAILY
Refills: 0 | Status: DISCONTINUED | OUTPATIENT
Start: 2025-07-18 | End: 2025-07-20

## 2025-07-18 RX ORDER — ROSUVASTATIN CALCIUM 20 MG/1
40 TABLET, FILM COATED ORAL AT BEDTIME
Refills: 0 | Status: DISCONTINUED | OUTPATIENT
Start: 2025-07-18 | End: 2025-07-20

## 2025-07-18 RX ORDER — TICAGRELOR 90 MG/1
90 TABLET ORAL EVERY 12 HOURS
Refills: 0 | Status: DISCONTINUED | OUTPATIENT
Start: 2025-07-18 | End: 2025-07-20

## 2025-07-18 RX ORDER — ACETAMINOPHEN 500 MG/5ML
650 LIQUID (ML) ORAL EVERY 6 HOURS
Refills: 0 | Status: DISCONTINUED | OUTPATIENT
Start: 2025-07-18 | End: 2025-07-20

## 2025-07-18 RX ORDER — SERTRALINE 100 MG/1
50 TABLET, FILM COATED ORAL DAILY
Refills: 0 | Status: DISCONTINUED | OUTPATIENT
Start: 2025-07-18 | End: 2025-07-20

## 2025-07-18 RX ORDER — MELATONIN 5 MG
3 TABLET ORAL AT BEDTIME
Refills: 0 | Status: DISCONTINUED | OUTPATIENT
Start: 2025-07-18 | End: 2025-07-20

## 2025-07-18 RX ORDER — LOSARTAN POTASSIUM 100 MG/1
25 TABLET, FILM COATED ORAL DAILY
Refills: 0 | Status: DISCONTINUED | OUTPATIENT
Start: 2025-07-18 | End: 2025-07-20

## 2025-07-18 RX ORDER — METOPROLOL SUCCINATE 50 MG/1
25 TABLET, EXTENDED RELEASE ORAL DAILY
Refills: 0 | Status: DISCONTINUED | OUTPATIENT
Start: 2025-07-18 | End: 2025-07-20

## 2025-07-18 RX ORDER — ASPIRIN 325 MG
81 TABLET ORAL DAILY
Refills: 0 | Status: DISCONTINUED | OUTPATIENT
Start: 2025-07-18 | End: 2025-07-20

## 2025-07-18 RX ORDER — OLANZAPINE 10 MG/1
2.5 TABLET ORAL AT BEDTIME
Refills: 0 | Status: DISCONTINUED | OUTPATIENT
Start: 2025-07-18 | End: 2025-07-20

## 2025-07-18 RX ORDER — MAGNESIUM, ALUMINUM HYDROXIDE 200-200 MG
30 TABLET,CHEWABLE ORAL EVERY 4 HOURS
Refills: 0 | Status: DISCONTINUED | OUTPATIENT
Start: 2025-07-18 | End: 2025-07-20

## 2025-07-18 RX ORDER — TAMSULOSIN HYDROCHLORIDE 0.4 MG/1
0.4 CAPSULE ORAL AT BEDTIME
Refills: 0 | Status: DISCONTINUED | OUTPATIENT
Start: 2025-07-18 | End: 2025-07-20

## 2025-07-18 RX ORDER — ENOXAPARIN SODIUM 100 MG/ML
40 INJECTION SUBCUTANEOUS EVERY 24 HOURS
Refills: 0 | Status: DISCONTINUED | OUTPATIENT
Start: 2025-07-18 | End: 2025-07-20

## 2025-07-18 RX ORDER — ONDANSETRON HCL/PF 4 MG/2 ML
4 VIAL (ML) INJECTION EVERY 8 HOURS
Refills: 0 | Status: DISCONTINUED | OUTPATIENT
Start: 2025-07-18 | End: 2025-07-20

## 2025-07-18 RX ADMIN — Medication 4 MILLIGRAM(S): at 15:35

## 2025-07-18 RX ADMIN — TICAGRELOR 90 MILLIGRAM(S): 90 TABLET ORAL at 17:32

## 2025-07-18 RX ADMIN — ROSUVASTATIN CALCIUM 40 MILLIGRAM(S): 20 TABLET, FILM COATED ORAL at 21:10

## 2025-07-18 RX ADMIN — Medication 30 MILLILITER(S): at 16:46

## 2025-07-18 RX ADMIN — TAMSULOSIN HYDROCHLORIDE 0.4 MILLIGRAM(S): 0.4 CAPSULE ORAL at 21:09

## 2025-07-18 RX ADMIN — OLANZAPINE 2.5 MILLIGRAM(S): 10 TABLET ORAL at 21:10

## 2025-07-19 ENCOUNTER — RESULT REVIEW (OUTPATIENT)
Age: 65
End: 2025-07-19

## 2025-07-19 LAB
ANION GAP SERPL CALC-SCNC: 12 MMOL/L — SIGNIFICANT CHANGE UP (ref 7–14)
BUN SERPL-MCNC: 14 MG/DL — SIGNIFICANT CHANGE UP (ref 7–23)
CALCIUM SERPL-MCNC: 9.4 MG/DL — SIGNIFICANT CHANGE UP (ref 8.4–10.5)
CHLORIDE SERPL-SCNC: 107 MMOL/L — SIGNIFICANT CHANGE UP (ref 98–107)
CHOLEST SERPL-MCNC: 139 MG/DL — SIGNIFICANT CHANGE UP
CO2 SERPL-SCNC: 20 MMOL/L — LOW (ref 22–31)
CREAT SERPL-MCNC: 0.9 MG/DL — SIGNIFICANT CHANGE UP (ref 0.5–1.3)
EGFR: 96 ML/MIN/1.73M2 — SIGNIFICANT CHANGE UP
EGFR: 96 ML/MIN/1.73M2 — SIGNIFICANT CHANGE UP
GLUCOSE SERPL-MCNC: 94 MG/DL — SIGNIFICANT CHANGE UP (ref 70–99)
HCT VFR BLD CALC: 39.1 % — SIGNIFICANT CHANGE UP (ref 39–50)
HDLC SERPL-MCNC: 33 MG/DL — LOW
HGB BLD-MCNC: 13.5 G/DL — SIGNIFICANT CHANGE UP (ref 13–17)
LDLC SERPL-MCNC: 88 MG/DL — SIGNIFICANT CHANGE UP
LIPID PNL WITH DIRECT LDL SERPL: 88 MG/DL — SIGNIFICANT CHANGE UP
MAGNESIUM SERPL-MCNC: 2 MG/DL — SIGNIFICANT CHANGE UP (ref 1.6–2.6)
MCHC RBC-ENTMCNC: 31 PG — SIGNIFICANT CHANGE UP (ref 27–34)
MCHC RBC-ENTMCNC: 34.5 G/DL — SIGNIFICANT CHANGE UP (ref 32–36)
MCV RBC AUTO: 89.9 FL — SIGNIFICANT CHANGE UP (ref 80–100)
NONHDLC SERPL-MCNC: 106 MG/DL — SIGNIFICANT CHANGE UP
NRBC # BLD AUTO: 0 K/UL — SIGNIFICANT CHANGE UP (ref 0–0)
NRBC # FLD: 0 K/UL — SIGNIFICANT CHANGE UP (ref 0–0)
NRBC BLD AUTO-RTO: 0 /100 WBCS — SIGNIFICANT CHANGE UP (ref 0–0)
PHOSPHATE SERPL-MCNC: 2.9 MG/DL — SIGNIFICANT CHANGE UP (ref 2.5–4.5)
PLATELET # BLD AUTO: 159 K/UL — SIGNIFICANT CHANGE UP (ref 150–400)
PMV BLD: 10.1 FL — SIGNIFICANT CHANGE UP (ref 7–13)
POTASSIUM SERPL-MCNC: 3.7 MMOL/L — SIGNIFICANT CHANGE UP (ref 3.5–5.3)
POTASSIUM SERPL-SCNC: 3.7 MMOL/L — SIGNIFICANT CHANGE UP (ref 3.5–5.3)
RBC # BLD: 4.35 M/UL — SIGNIFICANT CHANGE UP (ref 4.2–5.8)
RBC # FLD: 13 % — SIGNIFICANT CHANGE UP (ref 10.3–14.5)
SODIUM SERPL-SCNC: 139 MMOL/L — SIGNIFICANT CHANGE UP (ref 135–145)
TRIGL SERPL-MCNC: 94 MG/DL — SIGNIFICANT CHANGE UP
TSH SERPL-MCNC: 3.02 UIU/ML — SIGNIFICANT CHANGE UP (ref 0.27–4.2)
WBC # BLD: 11.94 K/UL — HIGH (ref 3.8–10.5)
WBC # FLD AUTO: 11.94 K/UL — HIGH (ref 3.8–10.5)

## 2025-07-19 PROCEDURE — 99232 SBSQ HOSP IP/OBS MODERATE 35: CPT

## 2025-07-19 PROCEDURE — 76376 3D RENDER W/INTRP POSTPROCES: CPT | Mod: 26

## 2025-07-19 PROCEDURE — 93306 TTE W/DOPPLER COMPLETE: CPT | Mod: 26

## 2025-07-19 RX ADMIN — Medication 81 MILLIGRAM(S): at 11:39

## 2025-07-19 RX ADMIN — ENOXAPARIN SODIUM 40 MILLIGRAM(S): 100 INJECTION SUBCUTANEOUS at 05:03

## 2025-07-19 RX ADMIN — ISOSORBIDE MONONITRATE 30 MILLIGRAM(S): 60 TABLET, EXTENDED RELEASE ORAL at 11:40

## 2025-07-19 RX ADMIN — SERTRALINE 50 MILLIGRAM(S): 100 TABLET, FILM COATED ORAL at 11:41

## 2025-07-19 RX ADMIN — TICAGRELOR 90 MILLIGRAM(S): 90 TABLET ORAL at 05:02

## 2025-07-19 RX ADMIN — TICAGRELOR 90 MILLIGRAM(S): 90 TABLET ORAL at 17:05

## 2025-07-19 RX ADMIN — Medication 650 MILLIGRAM(S): at 17:39

## 2025-07-19 RX ADMIN — LOSARTAN POTASSIUM 25 MILLIGRAM(S): 100 TABLET, FILM COATED ORAL at 05:02

## 2025-07-19 RX ADMIN — ROSUVASTATIN CALCIUM 40 MILLIGRAM(S): 20 TABLET, FILM COATED ORAL at 21:16

## 2025-07-19 RX ADMIN — Medication 4 MILLIGRAM(S): at 17:40

## 2025-07-19 RX ADMIN — OLANZAPINE 2.5 MILLIGRAM(S): 10 TABLET ORAL at 21:16

## 2025-07-19 RX ADMIN — Medication 1 APPLICATION(S): at 11:47

## 2025-07-19 RX ADMIN — METOPROLOL SUCCINATE 25 MILLIGRAM(S): 50 TABLET, EXTENDED RELEASE ORAL at 05:02

## 2025-07-19 RX ADMIN — TAMSULOSIN HYDROCHLORIDE 0.4 MILLIGRAM(S): 0.4 CAPSULE ORAL at 21:16

## 2025-07-20 VITALS
TEMPERATURE: 99 F | HEART RATE: 98 BPM | OXYGEN SATURATION: 98 % | DIASTOLIC BLOOD PRESSURE: 80 MMHG | SYSTOLIC BLOOD PRESSURE: 122 MMHG | RESPIRATION RATE: 18 BRPM

## 2025-07-20 LAB
ANION GAP SERPL CALC-SCNC: 14 MMOL/L — SIGNIFICANT CHANGE UP (ref 7–14)
BASOPHILS # BLD AUTO: 0.04 K/UL — SIGNIFICANT CHANGE UP (ref 0–0.2)
BASOPHILS NFR BLD AUTO: 0.4 % — SIGNIFICANT CHANGE UP (ref 0–2)
BUN SERPL-MCNC: 15 MG/DL — SIGNIFICANT CHANGE UP (ref 7–23)
CALCIUM SERPL-MCNC: 9.3 MG/DL — SIGNIFICANT CHANGE UP (ref 8.4–10.5)
CHLORIDE SERPL-SCNC: 105 MMOL/L — SIGNIFICANT CHANGE UP (ref 98–107)
CO2 SERPL-SCNC: 20 MMOL/L — LOW (ref 22–31)
CREAT SERPL-MCNC: 0.95 MG/DL — SIGNIFICANT CHANGE UP (ref 0.5–1.3)
EGFR: 90 ML/MIN/1.73M2 — SIGNIFICANT CHANGE UP
EGFR: 90 ML/MIN/1.73M2 — SIGNIFICANT CHANGE UP
EOSINOPHIL # BLD AUTO: 0.13 K/UL — SIGNIFICANT CHANGE UP (ref 0–0.5)
EOSINOPHIL NFR BLD AUTO: 1.2 % — SIGNIFICANT CHANGE UP (ref 0–6)
GLUCOSE SERPL-MCNC: 85 MG/DL — SIGNIFICANT CHANGE UP (ref 70–99)
HCT VFR BLD CALC: 36.7 % — LOW (ref 39–50)
HGB BLD-MCNC: 12.7 G/DL — LOW (ref 13–17)
IMM GRANULOCYTES # BLD AUTO: 0.02 K/UL — SIGNIFICANT CHANGE UP (ref 0–0.07)
IMM GRANULOCYTES NFR BLD AUTO: 0.2 % — SIGNIFICANT CHANGE UP (ref 0–0.9)
LYMPHOCYTES # BLD AUTO: 3.02 K/UL — SIGNIFICANT CHANGE UP (ref 1–3.3)
LYMPHOCYTES NFR BLD AUTO: 28.8 % — SIGNIFICANT CHANGE UP (ref 13–44)
MAGNESIUM SERPL-MCNC: 2 MG/DL — SIGNIFICANT CHANGE UP (ref 1.6–2.6)
MCHC RBC-ENTMCNC: 30.5 PG — SIGNIFICANT CHANGE UP (ref 27–34)
MCHC RBC-ENTMCNC: 34.6 G/DL — SIGNIFICANT CHANGE UP (ref 32–36)
MCV RBC AUTO: 88.2 FL — SIGNIFICANT CHANGE UP (ref 80–100)
MONOCYTES # BLD AUTO: 0.53 K/UL — SIGNIFICANT CHANGE UP (ref 0–0.9)
MONOCYTES NFR BLD AUTO: 5.1 % — SIGNIFICANT CHANGE UP (ref 2–14)
NEUTROPHILS # BLD AUTO: 6.75 K/UL — SIGNIFICANT CHANGE UP (ref 1.8–7.4)
NEUTROPHILS NFR BLD AUTO: 64.3 % — SIGNIFICANT CHANGE UP (ref 43–77)
NRBC # BLD AUTO: 0 K/UL — SIGNIFICANT CHANGE UP (ref 0–0)
NRBC # FLD: 0 K/UL — SIGNIFICANT CHANGE UP (ref 0–0)
NRBC BLD AUTO-RTO: 0 /100 WBCS — SIGNIFICANT CHANGE UP (ref 0–0)
PHOSPHATE SERPL-MCNC: 3 MG/DL — SIGNIFICANT CHANGE UP (ref 2.5–4.5)
PLATELET # BLD AUTO: 162 K/UL — SIGNIFICANT CHANGE UP (ref 150–400)
PMV BLD: 10.2 FL — SIGNIFICANT CHANGE UP (ref 7–13)
POTASSIUM SERPL-MCNC: 3.6 MMOL/L — SIGNIFICANT CHANGE UP (ref 3.5–5.3)
POTASSIUM SERPL-SCNC: 3.6 MMOL/L — SIGNIFICANT CHANGE UP (ref 3.5–5.3)
RBC # BLD: 4.16 M/UL — LOW (ref 4.2–5.8)
RBC # FLD: 12.9 % — SIGNIFICANT CHANGE UP (ref 10.3–14.5)
SODIUM SERPL-SCNC: 139 MMOL/L — SIGNIFICANT CHANGE UP (ref 135–145)
WBC # BLD: 10.49 K/UL — SIGNIFICANT CHANGE UP (ref 3.8–10.5)
WBC # FLD AUTO: 10.49 K/UL — SIGNIFICANT CHANGE UP (ref 3.8–10.5)

## 2025-07-20 PROCEDURE — 99239 HOSP IP/OBS DSCHRG MGMT >30: CPT

## 2025-07-20 RX ORDER — ISOSORBIDE MONONITRATE 60 MG/1
1 TABLET, EXTENDED RELEASE ORAL
Qty: 30 | Refills: 0
Start: 2025-07-20 | End: 2025-08-18

## 2025-07-20 RX ADMIN — LOSARTAN POTASSIUM 25 MILLIGRAM(S): 100 TABLET, FILM COATED ORAL at 05:33

## 2025-07-20 RX ADMIN — ISOSORBIDE MONONITRATE 30 MILLIGRAM(S): 60 TABLET, EXTENDED RELEASE ORAL at 11:51

## 2025-07-20 RX ADMIN — Medication 1 APPLICATION(S): at 11:50

## 2025-07-20 RX ADMIN — Medication 650 MILLIGRAM(S): at 17:44

## 2025-07-20 RX ADMIN — Medication 650 MILLIGRAM(S): at 16:59

## 2025-07-20 RX ADMIN — TICAGRELOR 90 MILLIGRAM(S): 90 TABLET ORAL at 05:33

## 2025-07-20 RX ADMIN — METOPROLOL SUCCINATE 25 MILLIGRAM(S): 50 TABLET, EXTENDED RELEASE ORAL at 05:33

## 2025-07-20 RX ADMIN — TICAGRELOR 90 MILLIGRAM(S): 90 TABLET ORAL at 17:00

## 2025-07-20 RX ADMIN — SERTRALINE 50 MILLIGRAM(S): 100 TABLET, FILM COATED ORAL at 11:51

## 2025-07-20 RX ADMIN — ENOXAPARIN SODIUM 40 MILLIGRAM(S): 100 INJECTION SUBCUTANEOUS at 05:33

## 2025-07-20 RX ADMIN — Medication 81 MILLIGRAM(S): at 11:51

## 2025-07-21 LAB
MRSA PCR RESULT.: SIGNIFICANT CHANGE UP
S AUREUS DNA NOSE QL NAA+PROBE: SIGNIFICANT CHANGE UP

## 2025-08-12 DIAGNOSIS — Z87.01 PERSONAL HISTORY OF PNEUMONIA (RECURRENT): ICD-10-CM

## 2025-08-12 DIAGNOSIS — E78.5 HYPERLIPIDEMIA, UNSPECIFIED: ICD-10-CM

## 2025-08-13 ENCOUNTER — NON-APPOINTMENT (OUTPATIENT)
Age: 65
End: 2025-08-13

## 2025-08-13 ENCOUNTER — APPOINTMENT (OUTPATIENT)
Dept: CARDIOLOGY | Facility: CLINIC | Age: 65
End: 2025-08-13
Payer: MEDICARE

## 2025-08-13 VITALS
DIASTOLIC BLOOD PRESSURE: 72 MMHG | HEIGHT: 70 IN | HEART RATE: 84 BPM | BODY MASS INDEX: 33.36 KG/M2 | SYSTOLIC BLOOD PRESSURE: 110 MMHG | WEIGHT: 233 LBS | OXYGEN SATURATION: 95 %

## 2025-08-13 DIAGNOSIS — I10 ESSENTIAL (PRIMARY) HYPERTENSION: ICD-10-CM

## 2025-08-13 DIAGNOSIS — I25.10 ATHEROSCLEROTIC HEART DISEASE OF NATIVE CORONARY ARTERY W/OUT ANGINA PECTORIS: ICD-10-CM

## 2025-08-13 PROCEDURE — 99214 OFFICE O/P EST MOD 30 MIN: CPT

## 2025-08-13 PROCEDURE — 93000 ELECTROCARDIOGRAM COMPLETE: CPT

## 2025-08-13 PROCEDURE — G2211 COMPLEX E/M VISIT ADD ON: CPT

## 2025-08-13 PROCEDURE — 99204 OFFICE O/P NEW MOD 45 MIN: CPT

## 2025-08-13 RX ORDER — ISOSORBIDE MONONITRATE 30 MG/1
30 TABLET, EXTENDED RELEASE ORAL DAILY
Qty: 90 | Refills: 3 | Status: ACTIVE | COMMUNITY
Start: 1900-01-01 | End: 1900-01-01